# Patient Record
Sex: FEMALE | Race: BLACK OR AFRICAN AMERICAN | Employment: FULL TIME | URBAN - METROPOLITAN AREA
[De-identification: names, ages, dates, MRNs, and addresses within clinical notes are randomized per-mention and may not be internally consistent; named-entity substitution may affect disease eponyms.]

---

## 2017-01-13 ENCOUNTER — OFFICE VISIT (OUTPATIENT)
Dept: FAMILY MEDICINE CLINIC | Age: 62
End: 2017-01-13

## 2017-01-13 VITALS
DIASTOLIC BLOOD PRESSURE: 92 MMHG | BODY MASS INDEX: 36.16 KG/M2 | SYSTOLIC BLOOD PRESSURE: 168 MMHG | WEIGHT: 211.8 LBS | HEART RATE: 80 BPM | RESPIRATION RATE: 16 BRPM | HEIGHT: 64 IN | TEMPERATURE: 98.4 F

## 2017-01-13 DIAGNOSIS — R00.2 PALPITATIONS: ICD-10-CM

## 2017-01-13 DIAGNOSIS — E66.09 NON MORBID OBESITY DUE TO EXCESS CALORIES: ICD-10-CM

## 2017-01-13 DIAGNOSIS — I10 ESSENTIAL HYPERTENSION: Primary | ICD-10-CM

## 2017-01-13 DIAGNOSIS — E78.2 MIXED HYPERLIPIDEMIA: ICD-10-CM

## 2017-01-13 RX ORDER — SIMVASTATIN 20 MG/1
10 TABLET, FILM COATED ORAL
Qty: 45 TAB | Refills: 3 | Status: SHIPPED | OUTPATIENT
Start: 2017-01-13 | End: 2017-05-23 | Stop reason: SDUPTHER

## 2017-01-13 RX ORDER — HYDROCHLOROTHIAZIDE 25 MG/1
25 TABLET ORAL DAILY
Qty: 90 TAB | Refills: 3 | Status: SHIPPED | OUTPATIENT
Start: 2017-01-13 | End: 2017-05-23 | Stop reason: SDUPTHER

## 2017-01-13 NOTE — PROGRESS NOTES
1. Have you been to the ER, urgent care clinic since your last visit? Hospitalized since your last visit? No.     2. Have you seen or consulted any other health care providers outside of the [facility] since your last visit? Include any pap smears or colon screening. No.    Patient presents with follow up Hypertension.

## 2017-01-13 NOTE — PROGRESS NOTES
Birgit Min is a 63 y.o. female and presents with Follow Up Chronic Condition and Hypertension       Subjective:    htn- taking all meds- started norvasc. Palpitations- never went for stress test because she couldn't make times given. Stopped caffeine and these have resolved. Hyperlipidemia- confusion over dosing at pharmacy. Obesity- no wt loss- trying to watch calories. Assessment/Plan:      htn- not controlled- increase hctz to 25mg today. Hyperlipidemia- needs only 10 mg zocor given norvasc addition. (will rx 20mg to cut in half as pt reports this is cheaper). Palpitations- resolved with caffeine removal- still would like to get stress testing given multiple risk factors and chronicity of these. Pt would like to try to get stress test at [facility] at this point- pt given phone number to call on Monday if they do not contact her today. Obesity- reminded of importance of calorie reduction to get weight to BMI 25 eventually by 1-2 lbs/week      RTC in 2 weeks. Orders Placed This Encounter    simvastatin (ZOCOR) 20 mg tablet     Sig: Take 0.5 Tabs by mouth nightly. Dispense:  45 Tab     Refill:  3    hydroCHLOROthiazide (HYDRODIURIL) 25 mg tablet     Sig: Take 1 Tab by mouth daily. Dispense:  90 Tab     Refill:  3       ROS:  Negative except as mentioned above  Cardiac- no chest pain or palpitations  Pulmonary- no sob or wheezes  GI- no n/v or diarrhea. SH:  Social History   Substance Use Topics    Smoking status: Never Smoker    Smokeless tobacco: None    Alcohol use No         Medications/Allergies:  Current Outpatient Prescriptions on File Prior to Visit   Medication Sig Dispense Refill    amLODIPine (NORVASC) 5 mg tablet Take 1 Tab by mouth daily. 90 Tab 3    simvastatin (ZOCOR) 10 mg tablet Take 1 Tab by mouth nightly. 90 Tab 3    aspirin delayed-release 81 mg tablet Take 1 Tab by mouth daily.  1 Tab 0    lisinopril (PRINIVIL, ZESTRIL) 40 mg tablet Take 1 Tab by mouth daily. 90 Tab 3    multivitamin (ONE A DAY) tablet Take 1 Tab by mouth daily.  hydrochlorothiazide (MICROZIDE) 12.5 mg capsule TAKE ONE CAPSULE BY MOUTH EVERY DAY 90 Cap 3    potassium chloride (K-DUR, KLOR-CON) 10 mEq tablet Take 2 tablets by mouth daily. 60 tablet 2     No current facility-administered medications on file prior to visit. Allergies   Allergen Reactions    Amoxicillin Anaphylaxis       Objective:  Visit Vitals    BP (!) 168/92    Pulse 80    Temp 98.4 °F (36.9 °C) (Oral)    Resp 16    Ht 5' 4\" (1.626 m)    Wt 211 lb 12.8 oz (96.1 kg)    BMI 36.36 kg/m2    Body mass index is 36.36 kg/(m^2). Constitutional: Well developed, nourished, no distress, alert   CV: S1, S2.  RRR. No murmurs/rubs. No thrills palpated. No carotid bruits. Intact distal pulses. No edema. Pulm: No abnormalities on inspection. Clear to auscultation bilaterally. No wheezing/rhonchi. Normal effort. GI: Soft, nontender, nondistended. Normal active bowel sounds. No  masses on palpation. No hepatosplenomegaly.

## 2017-01-13 NOTE — PATIENT INSTRUCTIONS
Body Mass Index: Care Instructions  Your Care Instructions    Body mass index (BMI) can help you see if your weight is raising your risk for health problems. It uses a formula to compare how much you weigh with how tall you are. A BMI between 18.5 and 24.9 is considered healthy. A BMI between 25 and 29.9 is considered overweight. A BMI of 30 or higher is considered obese. If your BMI is in the normal range, it means that you have a lower risk for weight-related health problems. If your BMI is in the overweight or obese range, you may be at increased risk for weight-related health problems, such as high blood pressure, heart disease, stroke, arthritis or joint pain, and diabetes. BMI is just one measure of your risk for weight-related health problems. You may be at higher risk for health problems if you are not active, you eat an unhealthy diet, or you drink too much alcohol or use tobacco products. Follow-up care is a key part of your treatment and safety. Be sure to make and go to all appointments, and call your doctor if you are having problems. It's also a good idea to know your test results and keep a list of the medicines you take. How can you care for yourself at home? · Practice healthy eating habits. This includes eating plenty of fruits, vegetables, whole grains, lean protein, and low-fat dairy. · Get at least 30 minutes of exercise 5 days a week or more. Brisk walking is a good choice. You also may want to do other activities, such as running, swimming, cycling, or playing tennis or team sports. · Do not smoke. Smoking can increase your risk for health problems. If you need help quitting, talk to your doctor about stop-smoking programs and medicines. These can increase your chances of quitting for good. · Limit alcohol to 2 drinks a day for men and 1 drink a day for women. Too much alcohol can cause health problems.   If you have a BMI higher than 25  · Your doctor may do other tests to check your risk for weight-related health problems. This may include measuring the distance around your waist. A waist measurement of more than 40 inches in men or 35 inches in women can increase the risk of weight-related health problems. · Talk with your doctor about steps you can take to stay healthy or improve your health. You may need to make lifestyle changes to lose weight and stay healthy, such as changing your diet and getting regular exercise. Where can you learn more? Go to http://yony-uyen.info/. Enter S176 in the search box to learn more about \"Body Mass Index: Care Instructions. \"  Current as of: February 16, 2016  Content Version: 11.1  © 9801-3196 DescribeMe. Care instructions adapted under license by [employer] (which disclaims liability or warranty for this information). If you have questions about a medical condition or this instruction, always ask your healthcare professional. [place] [address] any warranty or liability for your use of this information. Low Sodium Diet (2,000 Milligram): Care Instructions  Your Care Instructions  Too much sodium causes your body to hold on to extra water. This can raise your blood pressure and force your heart and kidneys to work harder. In very serious cases, this could cause you to be put in the hospital. It might even be life-threatening. By limiting sodium, you will feel better and lower your risk of serious problems. The most common source of sodium is salt. People get most of the salt in their diet from canned, prepared, and packaged foods. Fast food and restaurant meals also are very high in sodium. Your doctor will probably limit your sodium to less than 2,000 milligrams (mg) a day. This limit counts all the sodium in prepared and packaged foods and any salt you add to your food. Follow-up care is a key part of your treatment and safety.  Be sure to make and go to all appointments, and call your doctor if you are having problems. It's also a good idea to know your test results and keep a list of the medicines you take. How can you care for yourself at home? Read food labels  · Read labels on cans and food packages. The labels tell you how much sodium is in each serving. Make sure that you look at the serving size. If you eat more than the serving size, you have eaten more sodium. · Food labels also tell you the Percent Daily Value for sodium. Choose products with low Percent Daily Values for sodium. · Be aware that sodium can come in forms other than salt, including monosodium glutamate (MSG), sodium citrate, and sodium bicarbonate (baking soda). MSG is often added to Asian food. When you eat out, you can sometimes ask for food without MSG or added salt. Buy low-sodium foods  · Buy foods that are labeled \"unsalted\" (no salt added), \"sodium-free\" (less than 5 mg of sodium per serving), or \"low-sodium\" (less than 140 mg of sodium per serving). Foods labeled \"reduced-sodium\" and \"light sodium\" may still have too much sodium. Be sure to read the label to see how much sodium you are getting. · Buy fresh vegetables, or frozen vegetables without added sauces. Buy low-sodium versions of canned vegetables, soups, and other canned goods. Prepare low-sodium meals  · Cut back on the amount of salt you use in cooking. This will help you adjust to the taste. Do not add salt after cooking. One teaspoon of salt has about 2,300 mg of sodium. · Take the salt shaker off the table. · Flavor your food with garlic, lemon juice, onion, vinegar, herbs, and spices. Do not use soy sauce, lite soy sauce, steak sauce, onion salt, garlic salt, celery salt, mustard, or ketchup on your food. · Use low-sodium salad dressings, sauces, and ketchup. Or make your own salad dressings and sauces without adding salt. · Use less salt (or none) when recipes call for it.  You can often use half the salt a recipe calls for without losing flavor. Other foods such as rice, pasta, and grains do not need added salt. · Rinse canned vegetables, and cook them in fresh water. This removes somebut not allof the salt. · Avoid water that is naturally high in sodium or that has been treated with water softeners, which add sodium. Call your local water company to find out the sodium content of your water supply. If you buy bottled water, read the label and choose a sodium-free brand. Avoid high-sodium foods  · Avoid eating:  ¨ Smoked, cured, salted, and canned meat, fish, and poultry. ¨ Ham, coronel, hot dogs, and luncheon meats. ¨ Regular, hard, and processed cheese and regular peanut butter. ¨ Crackers with salted tops, and other salted snack foods such as pretzels, chips, and salted popcorn. ¨ Frozen prepared meals, unless labeled low-sodium. ¨ Canned and dried soups, broths, and bouillon, unless labeled sodium-free or low-sodium. ¨ Canned vegetables, unless labeled sodium-free or low-sodium. ¨ Western Claribel fries, pizza, tacos, and other fast foods. ¨ Pickles, olives, ketchup, and other condiments, especially soy sauce, unless labeled sodium-free or low-sodium. Where can you learn more? Go to http://yony-uyen.info/. Enter S656 in the search box to learn more about \"Low Sodium Diet (2,000 Milligram): Care Instructions. \"  Current as of: July 26, 2016  Content Version: 11.1  © 2250-2525 [employer]. Care instructions adapted under license by [employer] (which disclaims liability or warranty for this information). If you have questions about a medical condition or this instruction, always ask your healthcare professional. [place] [address] any warranty or liability for your use of this information.

## 2017-01-13 NOTE — MR AVS SNAPSHOT
Visit Information Date & Time Provider Department Dept. Phone Encounter #  
 1/13/2017  7:45 AM Tiffany Johnston, [address] [place] [address] [phone] [id number] Follow-up Instructions Return in about 2 weeks (around 1/27/2017). Upcoming Health Maintenance Date Due Hepatitis C Screening 1955 COLONOSCOPY 11/26/1975 Pneumococcal 19-64 Medium Risk (1 of 1 - PPSV23) 11/26/1976 EYE EXAM RETINAL OR DILATED Q1 11/20/2015 FOOT EXAM Q1 2/25/2016 MICROALBUMIN Q1 12/29/2016 HEMOGLOBIN A1C Q6M 6/12/2017 BREAST CANCER SCRN MAMMOGRAM 6/25/2017 LIPID PANEL Q1 12/12/2017 PAP AKA CERVICAL CYTOLOGY 2/25/2018 DTaP/Tdap/Td series (2 - Td) 11/12/2024 Allergies as of 1/13/2017  Review Complete On: 1/13/2017 By: Tiffany Johnston MD  
  
 Severity Noted Reaction Type Reactions Amoxicillin High 08/28/2014   Intolerance Anaphylaxis Current Immunizations  Reviewed on 12/30/2016 Name Date Influenza Vaccine 11/8/2012 Influenza Vaccine Faraz Velascosteffen) 12/30/2016, 10/5/2015 Tdap 11/12/2014  5:00 PM  
  
 Not reviewed this visit Vitals BP Pulse Temp Resp Height(growth percentile) Weight(growth percentile) (!) 168/92 80 98.4 °F (36.9 °C) (Oral) 16 5' 4\" (1.626 m) 211 lb 12.8 oz (96.1 kg) BMI OB Status Smoking Status 36.36 kg/m2 Postmenopausal Never Smoker Vitals History BMI and BSA Data Body Mass Index Body Surface Area  
 36.36 kg/m 2 2.08 m 2 Preferred Pharmacy Pharmacy Name Phone [facility], [address] [place] [address] [facility] Creole Danyy Your Updated Medication List  
  
   
This list is accurate as of: 1/13/17  8:13 AM.  Always use your most recent med list. amLODIPine 5 mg tablet Commonly known as:  Smith Fanti Take 1 Tab by mouth daily. aspirin delayed-release 81 mg tablet Take 1 Tab by mouth daily. hydroCHLOROthiazide 25 mg tablet Commonly known as:  HYDRODIURIL Take 1 Tab by mouth daily. lisinopril 40 mg tablet Commonly known as:  Thersia Kubas Take 1 Tab by mouth daily. multivitamin tablet Commonly known as:  ONE A DAY Take 1 Tab by mouth daily. potassium chloride 10 mEq tablet Commonly known as:  K-DUR, KLOR-CON Take 2 tablets by mouth daily. simvastatin 20 mg tablet Commonly known as:  ZOCOR Take 0.5 Tabs by mouth nightly. Prescriptions Sent to Pharmacy Refills  
 simvastatin (ZOCOR) 20 mg tablet 3 Sig: Take 0.5 Tabs by mouth nightly. Class: Normal  
 Pharmacy: [address] [place] [address] Ph #: [phone] Route: Oral  
 hydroCHLOROthiazide (HYDRODIURIL) 25 mg tablet 3 Sig: Take 1 Tab by mouth daily. Class: Normal  
 Pharmacy: [address] [place] [address] Ph #: [phone] Route: Oral  
  
Follow-up Instructions Return in about 2 weeks (around 1/27/2017). Patient Instructions Body Mass Index: Care Instructions Your Care Instructions Body mass index (BMI) can help you see if your weight is raising your risk for health problems. It uses a formula to compare how much you weigh with how tall you are. A BMI between 18.5 and 24.9 is considered healthy. A BMI between 25 and 29.9 is considered overweight. A BMI of 30 or higher is considered obese. If your BMI is in the normal range, it means that you have a lower risk for weight-related health problems. If your BMI is in the overweight or obese range, you may be at increased risk for weight-related health problems, such as high blood pressure, heart disease, stroke, arthritis or joint pain, and diabetes. BMI is just one measure of your risk for weight-related health problems.  You may be at higher risk for health problems if you are not active, you eat an unhealthy diet, or you drink too much alcohol or use tobacco products. Follow-up care is a key part of your treatment and safety. Be sure to make and go to all appointments, and call your doctor if you are having problems. It's also a good idea to know your test results and keep a list of the medicines you take. How can you care for yourself at home? · Practice healthy eating habits. This includes eating plenty of fruits, vegetables, whole grains, lean protein, and low-fat dairy. · Get at least 30 minutes of exercise 5 days a week or more. Brisk walking is a good choice. You also may want to do other activities, such as running, swimming, cycling, or playing tennis or team sports. · Do not smoke. Smoking can increase your risk for health problems. If you need help quitting, talk to your doctor about stop-smoking programs and medicines. These can increase your chances of quitting for good. · Limit alcohol to 2 drinks a day for men and 1 drink a day for women. Too much alcohol can cause health problems. If you have a BMI higher than 25 · Your doctor may do other tests to check your risk for weight-related health problems. This may include measuring the distance around your waist. A waist measurement of more than 40 inches in men or 35 inches in women can increase the risk of weight-related health problems. · Talk with your doctor about steps you can take to stay healthy or improve your health. You may need to make lifestyle changes to lose weight and stay healthy, such as changing your diet and getting regular exercise. Where can you learn more? Go to http://yony-uyen.info/. Enter S176 in the search box to learn more about \"Body Mass Index: Care Instructions. \" Current as of: February 16, 2016 Content Version: 11.1 © 8081-6616 [employer].  Care instructions adapted under license by [employer] (which disclaims liability or warranty for this information). If you have questions about a medical condition or this instruction, always ask your healthcare professional. [address] any warranty or liability for your use of this information. Low Sodium Diet (2,000 Milligram): Care Instructions Your Care Instructions Too much sodium causes your body to hold on to extra water. This can raise your blood pressure and force your heart and kidneys to work harder. In very serious cases, this could cause you to be put in the hospital. It might even be life-threatening. By limiting sodium, you will feel better and lower your risk of serious problems. The most common source of sodium is salt. People get most of the salt in their diet from canned, prepared, and packaged foods. Fast food and restaurant meals also are very high in sodium. Your doctor will probably limit your sodium to less than 2,000 milligrams (mg) a day. This limit counts all the sodium in prepared and packaged foods and any salt you add to your food. Follow-up care is a key part of your treatment and safety. Be sure to make and go to all appointments, and call your doctor if you are having problems. It's also a good idea to know your test results and keep a list of the medicines you take. How can you care for yourself at home? Read food labels · Read labels on cans and food packages. The labels tell you how much sodium is in each serving. Make sure that you look at the serving size. If you eat more than the serving size, you have eaten more sodium. · Food labels also tell you the Percent Daily Value for sodium. Choose products with low Percent Daily Values for sodium. · Be aware that sodium can come in forms other than salt, including monosodium glutamate (MSG), sodium citrate, and sodium bicarbonate (baking soda). MSG is often added to Asian food. When you eat out, you can sometimes ask for food without MSG or added salt. Buy low-sodium foods · Buy foods that are labeled \"unsalted\" (no salt added), \"sodium-free\" (less than 5 mg of sodium per serving), or \"low-sodium\" (less than 140 mg of sodium per serving). Foods labeled \"reduced-sodium\" and \"light sodium\" may still have too much sodium. Be sure to read the label to see how much sodium you are getting. · Buy fresh vegetables, or frozen vegetables without added sauces. Buy low-sodium versions of canned vegetables, soups, and other canned goods. Prepare low-sodium meals · Cut back on the amount of salt you use in cooking. This will help you adjust to the taste. Do not add salt after cooking. One teaspoon of salt has about 2,300 mg of sodium. · Take the salt shaker off the table. · Flavor your food with garlic, lemon juice, onion, vinegar, herbs, and spices. Do not use soy sauce, lite soy sauce, steak sauce, onion salt, garlic salt, celery salt, mustard, or ketchup on your food. · Use low-sodium salad dressings, sauces, and ketchup. Or make your own salad dressings and sauces without adding salt. · Use less salt (or none) when recipes call for it. You can often use half the salt a recipe calls for without losing flavor. Other foods such as rice, pasta, and grains do not need added salt. · Rinse canned vegetables, and cook them in fresh water. This removes somebut not allof the salt. · Avoid water that is naturally high in sodium or that has been treated with water softeners, which add sodium. Call your local water company to find out the sodium content of your water supply. If you buy bottled water, read the label and choose a sodium-free brand. Avoid high-sodium foods · Avoid eating: ¨ Smoked, cured, salted, and canned meat, fish, and poultry. ¨ Ham, coronel, hot dogs, and luncheon meats. ¨ Regular, hard, and processed cheese and regular peanut butter. ¨ Crackers with salted tops, and other salted snack foods such as pretzels, chips, and salted popcorn. ¨ Frozen prepared meals, unless labeled low-sodium. ¨ Canned and dried soups, broths, and bouillon, unless labeled sodium-free or low-sodium. ¨ Canned vegetables, unless labeled sodium-free or low-sodium. ¨ Western Claribel fries, pizza, tacos, and other fast foods. ¨ Pickles, olives, ketchup, and other condiments, especially soy sauce, unless labeled sodium-free or low-sodium. Where can you learn more? Go to http://yony-uyen.info/. Enter P980 in the search box to learn more about \"Low Sodium Diet (2,000 Milligram): Care Instructions. \" Current as of: July 26, 2016 Content Version: 11.1 © 2374-5314 [email or web address]. Care instructions adapted under license by [employer] (which disclaims liability or warranty for this information). If you have questions about a medical condition or this instruction, always ask your healthcare professional. [place] [address] any warranty or liability for your use of this information. Introducing [facility]! Butch Coello introduces [employer] patient portal. Now you can access parts of your medical record, email your doctor's office, and request medication refills online. 1. In your internet browser, go to [email or web address] 2. Click on the First Time User? Click Here link in the Sign In box. You will see the New Member Sign Up page. 3. Enter your [employer] Access Code exactly as it appears below. You will not need to use this code after youve completed the sign-up process. If you do not sign up before the expiration date, you must request a new code. · [employer] Access Code: [email or web address] Expires: 3/30/2017  9:22 AM 
 
4. Enter the last four digits of your Social Security Number (xxxx) and Date of Birth (mm/dd/yyyy) as indicated and click Submit. You will be taken to the next sign-up page. 5. Create a [employer] ID.  This will be your [employer] login ID and cannot be changed, so think of one that is secure and easy to remember. 6. Create a Crosswise password. You can change your password at any time. 7. Enter your Password Reset Question and Answer. This can be used at a later time if you forget your password. 8. Enter your e-mail address. You will receive e-mail notification when new information is available in [address] 9. Click Sign Up. You can now view and download portions of your medical record. 10. Click the Download Summary menu link to download a portable copy of your medical information. If you have questions, please visit the Frequently Asked Questions section of the Crosswise website. Remember, Crosswise is NOT to be used for urgent needs. For medical emergencies, dial 911. Now available from your iPhone and Android! Please provide this summary of care documentation to your next provider. Your primary care clinician is listed as KEVEN Smith. If you have any questions after today's visit, please call [phone].

## 2017-02-06 ENCOUNTER — OFFICE VISIT (OUTPATIENT)
Dept: FAMILY MEDICINE CLINIC | Age: 62
End: 2017-02-06

## 2017-02-06 VITALS
WEIGHT: 211 LBS | HEIGHT: 64 IN | SYSTOLIC BLOOD PRESSURE: 154 MMHG | HEART RATE: 85 BPM | DIASTOLIC BLOOD PRESSURE: 93 MMHG | TEMPERATURE: 96.6 F | BODY MASS INDEX: 36.02 KG/M2

## 2017-02-06 DIAGNOSIS — I10 ESSENTIAL HYPERTENSION: Primary | ICD-10-CM

## 2017-02-06 DIAGNOSIS — E78.2 MIXED HYPERLIPIDEMIA: ICD-10-CM

## 2017-02-06 DIAGNOSIS — R80.9 PROTEINURIA: ICD-10-CM

## 2017-02-06 DIAGNOSIS — E66.9 OBESITY (BMI 30-39.9): ICD-10-CM

## 2017-02-06 LAB
BILIRUB UR QL STRIP: NEGATIVE
GLUCOSE UR-MCNC: NEGATIVE MG/DL
KETONES P FAST UR STRIP-MCNC: NEGATIVE MG/DL
PH UR STRIP: 8.5 [PH] (ref 4.6–8)
PROT UR QL STRIP: ABNORMAL MG/DL
SP GR UR STRIP: 1.01 (ref 1–1.03)
UA UROBILINOGEN AMB POC: ABNORMAL (ref 0.2–1)
URINALYSIS CLARITY POC: CLEAR
URINALYSIS COLOR POC: YELLOW
URINE BLOOD POC: NEGATIVE
URINE LEUKOCYTES POC: NEGATIVE
URINE NITRITES POC: NEGATIVE

## 2017-02-06 RX ORDER — METOPROLOL SUCCINATE 25 MG/1
25 TABLET, EXTENDED RELEASE ORAL DAILY
Qty: 90 TAB | Refills: 1 | Status: SHIPPED | OUTPATIENT
Start: 2017-02-06 | End: 2017-05-23 | Stop reason: SDUPTHER

## 2017-02-06 RX ORDER — GLUCOSAMINE SULFATE 1500 MG
POWDER IN PACKET (EA) ORAL DAILY
COMMUNITY
End: 2017-09-04

## 2017-02-06 NOTE — MR AVS SNAPSHOT
Visit Information Date & Time Provider Department Dept. Phone Encounter #  
 2/6/2017  5:00 PM Carri Arteaga, [address] [facility] [phone] [id number] Follow-up Instructions Return in about 3 months (around 5/6/2017) for 30 minute slot. Upcoming Health Maintenance Date Due Hepatitis C Screening 1955 COLONOSCOPY 11/26/1975 Pneumococcal 19-64 Medium Risk (1 of 1 - PPSV23) 11/26/1976 EYE EXAM RETINAL OR DILATED Q1 11/20/2015 FOOT EXAM Q1 2/25/2016 MICROALBUMIN Q1 12/29/2016 HEMOGLOBIN A1C Q6M 6/12/2017 BREAST CANCER SCRN MAMMOGRAM 6/25/2017 LIPID PANEL Q1 12/12/2017 PAP AKA CERVICAL CYTOLOGY 2/25/2018 DTaP/Tdap/Td series (2 - Td) 11/12/2024 Allergies as of 2/6/2017  Review Complete On: 2/6/2017 By: Carri Arteaga MD  
  
 Severity Noted Reaction Type Reactions Amoxicillin High 08/28/2014   Intolerance Anaphylaxis Current Immunizations  Reviewed on 12/30/2016 Name Date Influenza Vaccine 11/8/2012 Influenza Vaccine Edouard Push) 12/30/2016, 10/5/2015 Tdap 11/12/2014  5:00 PM  
  
 Not reviewed this visit You Were Diagnosed With   
  
 Codes Comments Essential hypertension    -  Primary ICD-10-CM: I10 
ICD-9-CM: 401.9 Mixed hyperlipidemia     ICD-10-CM: E78.2 ICD-9-CM: 272.2 Obesity (BMI 30-39. 9)     ICD-10-CM: E66.9 ICD-9-CM: 278.00 Vitals BP Pulse Temp Height(growth percentile) Weight(growth percentile) BMI  
 (!) 154/93 85 96.6 °F (35.9 °C) (Oral) 5' 4\" (1.626 m) 211 lb (95.7 kg) 36.22 kg/m2 OB Status Smoking Status Postmenopausal Never Smoker BMI and BSA Data Body Mass Index Body Surface Area  
 36.22 kg/m 2 2.08 m 2 Preferred Pharmacy Pharmacy Name Phone [facility], [address] [place] [address] [phone] Your Updated Medication List  
  
   
 This list is accurate as of: 2/6/17  5:31 PM.  Always use your most recent med list. amLODIPine 5 mg tablet Commonly known as:  Elyn Coffer Take 1 Tab by mouth daily. aspirin delayed-release 81 mg tablet Take 1 Tab by mouth daily. hydroCHLOROthiazide 25 mg tablet Commonly known as:  HYDRODIURIL Take 1 Tab by mouth daily. lisinopril 40 mg tablet Commonly known as:  Robertha Roup Take 1 Tab by mouth daily. metoprolol succinate 25 mg XL tablet Commonly known as:  TOPROL-XL Take 1 Tab by mouth daily. multivitamin tablet Commonly known as:  ONE A DAY Take 1 Tab by mouth daily. simvastatin 20 mg tablet Commonly known as:  ZOCOR Take 0.5 Tabs by mouth nightly. VITAMIN D3 1,000 unit Cap Generic drug:  cholecalciferol Take  by mouth daily. Prescriptions Sent to Pharmacy Refills  
 metoprolol succinate (TOPROL-XL) 25 mg XL tablet 1 Sig: Take 1 Tab by mouth daily. Class: Normal  
 Pharmacy: [id number] [address] [facility] [address], [address] [place] [address] #: [phone] Route: Oral  
  
Follow-up Instructions Return in about 3 months (around 5/6/2017) for 30 minute slot. Patient Instructions DASH Diet: Care Instructions Your Care Instructions The DASH diet is an eating plan that can help lower your blood pressure. DASH stands for Dietary Approaches to Stop Hypertension. Hypertension is high blood pressure. The DASH diet focuses on eating foods that are high in calcium, potassium, and magnesium. These nutrients can lower blood pressure. The foods that are highest in these nutrients are fruits, vegetables, low-fat dairy products, nuts, seeds, and legumes. But taking calcium, potassium, and magnesium supplements instead of eating foods that are high in those nutrients does not have the same effect. The DASH diet also includes whole grains, fish, and poultry. The DASH diet is one of several lifestyle changes your doctor may recommend to lower your high blood pressure. Your doctor may also want you to decrease the amount of sodium in your diet. Lowering sodium while following the DASH diet can lower blood pressure even further than just the DASH diet alone. Follow-up care is a key part of your treatment and safety. Be sure to make and go to all appointments, and call your doctor if you are having problems. It's also a good idea to know your test results and keep a list of the medicines you take. How can you care for yourself at home? Following the DASH diet · Eat 4 to 5 servings of fruit each day. A serving is 1 medium-sized piece of fruit, ½ cup chopped or canned fruit, 1/4 cup dried fruit, or 4 ounces (½ cup) of fruit juice. Choose fruit more often than fruit juice. · Eat 4 to 5 servings of vegetables each day. A serving is 1 cup of lettuce or raw leafy vegetables, ½ cup of chopped or cooked vegetables, or 4 ounces (½ cup) of vegetable juice. Choose vegetables more often than vegetable juice. · Get 2 to 3 servings of low-fat and fat-free dairy each day. A serving is 8 ounces of milk, 1 cup of yogurt, or 1 ½ ounces of cheese. · Eat 6 to 8 servings of grains each day. A serving is 1 slice of bread, 1 ounce of dry cereal, or ½ cup of cooked rice, pasta, or cooked cereal. Try to choose whole-grain products as much as possible. · Limit lean meat, poultry, and fish to 2 servings each day. A serving is 3 ounces, about the size of a deck of cards. · Eat 4 to 5 servings of nuts, seeds, and legumes (cooked dried beans, lentils, and split peas) each week. A serving is 1/3 cup of nuts, 2 tablespoons of seeds, or ½ cup of cooked beans or peas. · Limit fats and oils to 2 to 3 servings each day. A serving is 1 teaspoon of vegetable oil or 2 tablespoons of salad dressing. · Limit sweets and added sugars to 5 servings or less a week.  A serving is 1 tablespoon jelly or jam, ½ cup sorbet, or 1 cup of lemonade. · Eat less than 2,300 milligrams (mg) of sodium a day. If you limit your sodium to 1,500 mg a day, you can lower your blood pressure even more. Tips for success · Start small. Do not try to make dramatic changes to your diet all at once. You might feel that you are missing out on your favorite foods and then be more likely to not follow the plan. Make small changes, and stick with them. Once those changes become habit, add a few more changes. · Try some of the following: ¨ Make it a goal to eat a fruit or vegetable at every meal and at snacks. This will make it easy to get the recommended amount of fruits and vegetables each day. ¨ Try yogurt topped with fruit and nuts for a snack or healthy dessert. ¨ Add lettuce, tomato, cucumber, and onion to sandwiches. ¨ Combine a ready-made pizza crust with low-fat mozzarella cheese and lots of vegetable toppings. Try using tomatoes, squash, spinach, broccoli, carrots, cauliflower, and onions. ¨ Have a variety of cut-up vegetables with a low-fat dip as an appetizer instead of chips and dip. ¨ Sprinkle sunflower seeds or chopped almonds over salads. Or try adding chopped walnuts or almonds to cooked vegetables. ¨ Try some vegetarian meals using beans and peas. Add garbanzo or kidney beans to salads. Make burritos and tacos with mashed ceballos beans or black beans. Where can you learn more? Go to http://yony-uyen.info/. Enter G684 in the search box to learn more about \"DASH Diet: Care Instructions. \" Current as of: March 23, 2016 Content Version: 11.1 © 5344-3991 [employer]. Care instructions adapted under license by [employer] (which disclaims liability or warranty for this information).  If you have questions about a medical condition or this instruction, always ask your healthcare professional. [employer] disclaims any warranty or liability for your use of this information. Introducing [facility]! [employer] introduces [employer] patient portal. Now you can access parts of your medical record, email your doctor's office, and request medication refills online. 1. In your internet browser, go to [email or web address] 2. Click on the First Time User? Click Here link in the Sign In box. You will see the New Member Sign Up page. 3. Enter your [employer] Access Code exactly as it appears below. You will not need to use this code after youve completed the sign-up process. If you do not sign up before the expiration date, you must request a new code. · [employer] Access Code: [email or web address] Expires: 3/30/2017  9:22 AM 
 
4. Enter the last four digits of your Social Security Number (xxxx) and Date of Birth (mm/dd/yyyy) as indicated and click Submit. You will be taken to the next sign-up page. 5. Create a [employer] ID. This will be your [employer] login ID and cannot be changed, so think of one that is secure and easy to remember. 6. Create a [employer] password. You can change your password at any time. 7. Enter your Password Reset Question and Answer. This can be used at a later time if you forget your password. 8. Enter your e-mail address. You will receive e-mail notification when new information is available in [address] 9. Click Sign Up. You can now view and download portions of your medical record. 10. Click the Download Summary menu link to download a portable copy of your medical information. If you have questions, please visit the Frequently Asked Questions section of the [employer] website. Remember, [employer] is NOT to be used for urgent needs. For medical emergencies, dial 911. Now available from your iPhone and Android! Please provide this summary of care documentation to your next provider. Your primary care clinician is listed as KEVEN Smith. If you have any questions after today's visit, please call [phone].

## 2017-02-06 NOTE — PROGRESS NOTES
Warren Will is a 63 y.o. female and presents with Follow Up Chronic Condition; Hypertension; Cholesterol Problem; Palpitations; and Results (Stress test)       Subjective:    htn- taking all meds- increased hctz to 25mg  Palpitations- never went for stress test because she couldn't make times given. Stopped caffeine and these have resolved. Hyperlipidemia- confusion over dosing at pharmacy. Obesity- no wt loss- trying to watch calories. Pt thinks she may have a uti today- had some mild microalbuminuria on old testing.      Assessment/Plan:      htn- not controlled- adding low dose Beta blocker. Hyperlipidemia- needs only 10 mg zocor given norvasc addition. (will rx 20mg to cut in half as pt reports this is cheaper). Palpitations- resolved with caffeine removal- negative stress test.   Obesity- again reminded of importance of calorie reduction to get weight to BMI 25 eventually by 1-2 lbs/week  1+ urine protein- recheck next visit- will plan to do microalbumin if still present. Pt is prediabetic and on ACE already. RTC in 3 months  Orders Placed This Encounter    cholecalciferol (VITAMIN D3) 1,000 unit cap     Sig: Take  by mouth daily.  metoprolol succinate (TOPROL-XL) 25 mg XL tablet     Sig: Take 1 Tab by mouth daily. Dispense:  90 Tab     Refill:  1           ROS:  Negative except as mentioned above  Cardiac- no chest pain or palpitations  Pulmonary- no sob or wheezes  GI- no n/v or diarrhea. SH:  Social History   Substance Use Topics    Smoking status: Never Smoker    Smokeless tobacco: None    Alcohol use No         Medications/Allergies:  Current Outpatient Prescriptions on File Prior to Visit   Medication Sig Dispense Refill    simvastatin (ZOCOR) 20 mg tablet Take 0.5 Tabs by mouth nightly. 45 Tab 3    hydroCHLOROthiazide (HYDRODIURIL) 25 mg tablet Take 1 Tab by mouth daily. 90 Tab 3    amLODIPine (NORVASC) 5 mg tablet Take 1 Tab by mouth daily.  90 Tab 3    aspirin delayed-release 81 mg tablet Take 1 Tab by mouth daily. 1 Tab 0    lisinopril (PRINIVIL, ZESTRIL) 40 mg tablet Take 1 Tab by mouth daily. 90 Tab 3    multivitamin (ONE A DAY) tablet Take 1 Tab by mouth daily. No current facility-administered medications on file prior to visit. Allergies   Allergen Reactions    Amoxicillin Anaphylaxis       Objective:  Visit Vitals    BP (!) 154/93    Pulse 85    Temp 96.6 °F (35.9 °C) (Oral)    Ht 5' 4\" (1.626 m)    Wt 211 lb (95.7 kg)    BMI 36.22 kg/m2    Body mass index is 36.22 kg/(m^2). Constitutional: Well developed, nourished, no distress, alert   CV: S1, S2.  RRR. No murmurs/rubs. No thrills palpated. No carotid bruits. Intact distal pulses. No edema. Pulm: No abnormalities on inspection. Clear to auscultation bilaterally. No wheezing/rhonchi. Normal effort. GI: Soft, nontender, nondistended. Normal active bowel sounds. No  masses on palpation. No hepatosplenomegaly.

## 2017-02-06 NOTE — PROGRESS NOTES
1. Have you been to the ER, urgent care clinic since your last visit? Hospitalized since your last visit? No.     2. Have you seen or consulted any other health care providers outside of the [address] [place] [address] since your last visit? Include any pap smears or colon screening. No.    Patient presents with follow up Hypertension and Palpitations.  Need results of her stress test.

## 2017-02-06 NOTE — PATIENT INSTRUCTIONS

## 2017-05-23 ENCOUNTER — OFFICE VISIT (OUTPATIENT)
Dept: FAMILY MEDICINE CLINIC | Age: 62
End: 2017-05-23

## 2017-05-23 VITALS
HEIGHT: 64 IN | TEMPERATURE: 98.5 F | DIASTOLIC BLOOD PRESSURE: 88 MMHG | SYSTOLIC BLOOD PRESSURE: 141 MMHG | BODY MASS INDEX: 36.54 KG/M2 | RESPIRATION RATE: 16 BRPM | WEIGHT: 214 LBS | HEART RATE: 72 BPM

## 2017-05-23 DIAGNOSIS — E66.09 NON MORBID OBESITY DUE TO EXCESS CALORIES: ICD-10-CM

## 2017-05-23 DIAGNOSIS — Z12.11 COLON CANCER SCREENING: ICD-10-CM

## 2017-05-23 DIAGNOSIS — M71.22 BAKER'S CYST OF KNEE, LEFT: ICD-10-CM

## 2017-05-23 DIAGNOSIS — I10 ESSENTIAL HYPERTENSION: ICD-10-CM

## 2017-05-23 DIAGNOSIS — E78.2 MIXED HYPERLIPIDEMIA: ICD-10-CM

## 2017-05-23 DIAGNOSIS — R73.03 PREDIABETES: Primary | ICD-10-CM

## 2017-05-23 PROBLEM — M71.20 BAKER'S CYST OF KNEE: Status: ACTIVE | Noted: 2017-05-23

## 2017-05-23 RX ORDER — LISINOPRIL 40 MG/1
40 TABLET ORAL DAILY
Qty: 90 TAB | Refills: 3 | Status: SHIPPED | OUTPATIENT
Start: 2017-05-23 | End: 2018-05-18 | Stop reason: SDUPTHER

## 2017-05-23 RX ORDER — HYDROCHLOROTHIAZIDE 25 MG/1
25 TABLET ORAL DAILY
Qty: 90 TAB | Refills: 3 | Status: SHIPPED | OUTPATIENT
Start: 2017-05-23 | End: 2018-06-16 | Stop reason: SDUPTHER

## 2017-05-23 RX ORDER — AMLODIPINE BESYLATE 5 MG/1
5 TABLET ORAL DAILY
Qty: 90 TAB | Refills: 3 | Status: SHIPPED | OUTPATIENT
Start: 2017-05-23 | End: 2018-05-18 | Stop reason: SDUPTHER

## 2017-05-23 RX ORDER — SIMVASTATIN 10 MG/1
10 TABLET, FILM COATED ORAL
Qty: 90 TAB | Refills: 3 | Status: SHIPPED | OUTPATIENT
Start: 2017-05-23 | End: 2018-06-06 | Stop reason: SDUPTHER

## 2017-05-23 RX ORDER — METOPROLOL SUCCINATE 25 MG/1
25 TABLET, EXTENDED RELEASE ORAL DAILY
Qty: 90 TAB | Refills: 1 | Status: SHIPPED | OUTPATIENT
Start: 2017-05-23 | End: 2017-09-04

## 2017-05-23 NOTE — PROGRESS NOTES
Izabel Jackson is a 63 y.o. female and presents with Complete Physical; Hypertension; and Cholesterol Problem       Subjective:    HTN- didn't take meds this am. No cp or sob. Hyperlipidemia- on zocor at 10 mg now. No myalgia. cpe- would like this today. No new issues except pain in left knee  HM- never went to GI for scope- discussed barriers today- pt willing to go. Assessment/Plan:      Nidia Cardoza was seen today for complete physical, hypertension and cholesterol problem. Diagnoses and all orders for this visit:    Prediabetes  -     HEMOGLOBIN A1C WITH EAG; Future  -     MICROALBUMIN, UR, RAND W/ MICROALBUMIN/CREA RATIO; Future  -     HEMOGLOBIN A1C WITH EAG  -     MICROALBUMIN, UR, RAND W/ MICROALBUMIN/CREA RATIO    Mixed hyperlipidemia  -     LIPID PANEL; Future  -     LIPID PANEL    Essential hypertension  -     METABOLIC PANEL, COMPREHENSIVE; Future  -     MICROALBUMIN, UR, RAND W/ MICROALBUMIN/CREA RATIO; Future  -     METABOLIC PANEL, COMPREHENSIVE  -     MICROALBUMIN, UR, RAND W/ MICROALBUMIN/CREA RATIO    Non morbid obesity due to excess calories    Colon cancer screening  -     REFERRAL TO GASTROENTEROLOGY    Baker's cyst of knee, left- discussed care and will refer to sports med or ortho if pain continues. nsaid for 2 weeks. The patient was advised that NSAID-type medications have three very important potential side effects: MI, gastrointestinal irritation including hemorrhage and renal injuries. She was asked to take the medication with food and to stop if she experiences any GI upset. I asked her to call for CP/sob, vomiting, abdominal pain or black/bloody stools. The patient expresses understanding of these issues and questions were answered. Other orders  -     simvastatin (ZOCOR) 10 mg tablet; Take 1 Tab by mouth nightly. -     metoprolol succinate (TOPROL-XL) 25 mg XL tablet; Take 1 Tab by mouth daily. -     hydroCHLOROthiazide (HYDRODIURIL) 25 mg tablet;  Take 1 Tab by mouth daily.  -     amLODIPine (NORVASC) 5 mg tablet; Take 1 Tab by mouth daily. -     lisinopril (PRINIVIL, ZESTRIL) 40 mg tablet; Take 1 Tab by mouth daily. RTC in 6 months. Orders Placed This Encounter    METABOLIC PANEL, COMPREHENSIVE     Standing Status:   Future     Standing Expiration Date:   5/24/2018    LIPID PANEL     Standing Status:   Future     Standing Expiration Date:   5/24/2018    HEMOGLOBIN A1C WITH EAG     Standing Status:   Future     Standing Expiration Date:   5/24/2018    MICROALBUMIN, UR, RAND W/ MICROALBUMIN/CREA RATIO     Standing Status:   Future     Standing Expiration Date:   5/24/2018    simvastatin (ZOCOR) 10 mg tablet     Sig: Take 1 Tab by mouth nightly. Dispense:  90 Tab     Refill:  3    metoprolol succinate (TOPROL-XL) 25 mg XL tablet     Sig: Take 1 Tab by mouth daily. Dispense:  90 Tab     Refill:  1    hydroCHLOROthiazide (HYDRODIURIL) 25 mg tablet     Sig: Take 1 Tab by mouth daily. Dispense:  90 Tab     Refill:  3    amLODIPine (NORVASC) 5 mg tablet     Sig: Take 1 Tab by mouth daily. Dispense:  90 Tab     Refill:  3    lisinopril (PRINIVIL, ZESTRIL) 40 mg tablet     Sig: Take 1 Tab by mouth daily. Dispense:  90 Tab     Refill:  3           ROS:  Negative except as mentioned above  Cardiac-  Pulmonary-  GI-    SH:  Social History   Substance Use Topics    Smoking status: Never Smoker    Smokeless tobacco: None    Alcohol use No         Medications/Allergies:  Current Outpatient Prescriptions on File Prior to Visit   Medication Sig Dispense Refill    cholecalciferol (VITAMIN D3) 1,000 unit cap Take  by mouth daily.  aspirin delayed-release 81 mg tablet Take 1 Tab by mouth daily. 1 Tab 0    multivitamin (ONE A DAY) tablet Take 1 Tab by mouth daily. No current facility-administered medications on file prior to visit.            Allergies   Allergen Reactions    Amoxicillin Anaphylaxis       Objective:  Visit Vitals    /88  Pulse 72    Temp 98.5 °F (36.9 °C) (Oral)    Resp 16    Ht 5' 4\" (1.626 m)    Wt 214 lb (97.1 kg)    BMI 36.73 kg/m2    Body mass index is 36.73 kg/(m^2). Constitutional: Well developed, nourished, no distress, alert   HENT: Exterior ears and tympanic membranes normal bilaterally. Supple neck. No thyromegaly or lymphadenopathy. Oropharynx clear and moist mucous membranes. Eyes: Conjunctiva normal. PERRL. CV: S1, S2.  RRR. No murmurs/rubs. No thrills palpated. No carotid bruits. Intact distal pulses. No edema. Pulm: No abnormalities on inspection. Clear to auscultation bilaterally. No wheezing/rhonchi. Normal effort. GI: Soft, nontender, nondistended. Normal active bowel sounds. No  masses on palpation. No hepatosplenomegaly.

## 2017-05-23 NOTE — PROGRESS NOTES
1. Have you been to the ER, urgent care clinic since your last visit? Hospitalized since your last visit? No.     2. Have you seen or consulted any other health care providers outside of the [facility] since your last visit? Include any pap smears or colon screening.  No.    Patient presents with Complete Physical Exam.

## 2017-05-24 LAB
ALBUMIN SERPL-MCNC: 4.1 G/DL (ref 3.5–5.5)
ALBUMIN/CREAT UR: 5.6 MG/G CREAT (ref 0–30)
ALBUMIN/GLOB SERPL: 1.5 {RATIO} (ref 1.2–2.2)
ALP SERPL-CCNC: 50 IU/L (ref 39–117)
ALT SERPL-CCNC: 16 IU/L (ref 0–32)
AST SERPL-CCNC: 13 IU/L (ref 0–40)
BILIRUB SERPL-MCNC: 0.3 MG/DL (ref 0–1.2)
BUN SERPL-MCNC: 13 MG/DL (ref 6–24)
BUN/CREAT SERPL: 20 (ref 9–23)
CALCIUM SERPL-MCNC: 9.4 MG/DL (ref 8.7–10.2)
CHLORIDE SERPL-SCNC: 102 MMOL/L (ref 96–106)
CHOLEST SERPL-MCNC: 102 MG/DL (ref 100–199)
CO2 SERPL-SCNC: 25 MMOL/L (ref 18–29)
CREAT SERPL-MCNC: 0.65 MG/DL (ref 0.57–1)
CREAT UR-MCNC: 172.2 MG/DL
EST. AVERAGE GLUCOSE BLD GHB EST-MCNC: 140 MG/DL
GLOBULIN SER CALC-MCNC: 2.8 G/DL (ref 1.5–4.5)
GLUCOSE SERPL-MCNC: 123 MG/DL (ref 65–99)
HBA1C MFR BLD: 6.5 % (ref 4.8–5.6)
HDLC SERPL-MCNC: 51 MG/DL
INTERPRETATION, 910389: NORMAL
LDLC SERPL CALC-MCNC: 27 MG/DL (ref 0–99)
MICROALBUMIN UR-MCNC: 9.7 UG/ML
POTASSIUM SERPL-SCNC: 4.1 MMOL/L (ref 3.5–5.2)
PROT SERPL-MCNC: 6.9 G/DL (ref 6–8.5)
SODIUM SERPL-SCNC: 142 MMOL/L (ref 134–144)
TRIGL SERPL-MCNC: 119 MG/DL (ref 0–149)
VLDLC SERPL CALC-MCNC: 24 MG/DL (ref 5–40)

## 2017-11-07 ENCOUNTER — OFFICE VISIT (OUTPATIENT)
Dept: FAMILY MEDICINE CLINIC | Age: 62
End: 2017-11-07

## 2017-11-07 VITALS
HEIGHT: 60 IN | DIASTOLIC BLOOD PRESSURE: 75 MMHG | RESPIRATION RATE: 16 BRPM | HEART RATE: 64 BPM | BODY MASS INDEX: 42.37 KG/M2 | WEIGHT: 215.8 LBS | SYSTOLIC BLOOD PRESSURE: 120 MMHG | TEMPERATURE: 98.7 F

## 2017-11-07 DIAGNOSIS — I10 ESSENTIAL HYPERTENSION: ICD-10-CM

## 2017-11-07 DIAGNOSIS — Z23 ENCOUNTER FOR IMMUNIZATION: ICD-10-CM

## 2017-11-07 DIAGNOSIS — R73.03 PREDIABETES: Primary | ICD-10-CM

## 2017-11-07 DIAGNOSIS — E78.2 MIXED HYPERLIPIDEMIA: ICD-10-CM

## 2017-11-07 DIAGNOSIS — E66.01 OBESITY, MORBID, BMI 40.0-49.9 (HCC): ICD-10-CM

## 2017-11-07 RX ORDER — METFORMIN HYDROCHLORIDE 500 MG/1
500 TABLET, EXTENDED RELEASE ORAL
Qty: 90 TAB | Refills: 3 | Status: SHIPPED | OUTPATIENT
Start: 2017-11-07 | End: 2018-11-05 | Stop reason: SDUPTHER

## 2017-11-07 NOTE — PATIENT INSTRUCTIONS
Learning About Cutting Calories  How do calories affect your weight? Food gives your body energy. Energy from the food you eat is measured in calories. This energy keeps your heart beating, your brain active, and your muscles working. Your body needs a certain number of calories each day. After your body uses the calories it needs, it stores extra calories as fat. To lose weight safely, you have to eat fewer calories while eating in a healthy way. How many calories do you need each day? The more active you are, the more calories you need. When you are less active, you need fewer calories. How many calories you need each day also depends on several things, including your age and whether you are male or female. Here are some general guidelines for adults:  · Less active women and older adults need 1,600 to 2,000 calories each day. · Active women and less active men need 2,000 to 2,400 calories each day. · Active men need 2,400 to 3,000 calories each day. How can you cut calories and eat healthy meals? Whole grains, vegetables and fruits, and dried beans are good lower-calorie foods. They give you lots of nutrients and fiber. And they fill you up. Sweets, energy drinks, and soda pop are high in calories. They give you few nutrients and no fiber. Try to limit soda pop, fruit juice, and energy drinks. Drink water instead. Some fats can be part of a healthy diet. But cutting back on fats from highly processed foods like fast foods and many snack foods is a good way to lower the calories in your diet. Also, use smaller amounts of fats like butter, margarine, salad dressing, and mayonnaise. Add fresh garlic, lemon, or herbs to your meals to add flavor without adding fat. Meats and dairy products can be a big source of hidden fats. Try to choose lean or low-fat versions of these products. Fat-free cookies, candies, chips, and frozen treats can still be high in sugar and calories.  Some fat-free foods have more calories than regular ones. Eat fat-free treats in moderation, as you would other foods. If your favorite foods are high in fat, salt, sugar, or calories, limit how often you eat them. Eat smaller servings, or look for healthy substitutes. Fill up on fruits, vegetables, and whole grains. Eating at home  · Use meat as a side dish instead of as the main part of your meal.  · Try main dishes that use whole wheat pasta, brown rice, dried beans, or vegetables. · Find ways to cook with little or no fat, such as broiling, steaming, or grilling. · Use cooking spray instead of oil. If you use oil, use a monounsaturated oil, such as canola or olive oil. · Trim fat from meats before you cook them. · Drain off fat after you brown the meat or while you roast it. · Chill soups and stews after you cook them. Then skim the fat off the top after it hardens. Eating out  · Order foods that are broiled or poached rather than fried or breaded. · Cut back on the amount of butter or margarine that you use on bread. · Order sauces, gravies, and salad dressings on the side, and use only a little. · When you order pasta, choose tomato sauce rather than cream sauce. · Ask for salsa with your baked potato instead of sour cream, butter, cheese, or coronel. · Order meals in a small size instead of upgrading to a large. · Share an entree, or take part of your food home to eat as another meal.  · Share appetizers and desserts. Where can you learn more? Go to http://yony-uyen.info/. Enter 99 [id number] in the search box to learn more about \"Learning About Cutting Calories. \"  Current as of: May 12, 2017  Content Version: 11.4  © 7612-8035 Healthwise, [place]. Care instructions adapted under license by [employer] (which disclaims liability or warranty for this information).  If you have questions about a medical condition or this instruction, always ask your healthcare professional. [employer] disclaims any warranty or liability for your use of this information. Prediabetes: Care Instructions  Your Care Instructions    Prediabetes is a warning sign that you are at risk for getting type 2 diabetes. It means that your blood sugar is higher than it should be. The food you eat turns into sugar, which your body uses for energy. Normally, an organ called the pancreas makes insulin, which allows the sugar in your blood to get into your body's cells. But when your body can't use insulin the right way, the sugar doesn't move into cells. It stays in your blood instead. This is called insulin resistance. The buildup of sugar in the blood causes prediabetes. The good news is that lifestyle changes may help you get your blood sugar back to normal and help you avoid or delay diabetes. Follow-up care is a key part of your treatment and safety. Be sure to make and go to all appointments, and call your doctor if you are having problems. It's also a good idea to know your test results and keep a list of the medicines you take. How can you care for yourself at home? · Watch your weight. A healthy weight helps your body use insulin properly. · Limit the amount of calories, sweets, and unhealthy fat you eat. Ask your doctor if you should see a dietitian. A registered dietitian can help you create meal plans that fit your lifestyle. · Get at least 30 minutes of exercise on most days of the week. Exercise helps control your blood sugar. It also helps you maintain a healthy weight. Walking is a good choice. You also may want to do other activities, such as running, swimming, cycling, or playing tennis or team sports. · Do not smoke. Smoking can make prediabetes worse. If you need help quitting, talk to your doctor about stop-smoking programs and medicines. These can increase your chances of quitting for good. · If your doctor prescribed medicines, take them exactly as prescribed.  Call your doctor if you think you are having a problem with your medicine. You will get more details on the specific medicines your doctor prescribes. When should you call for help? Watch closely for changes in your health, and be sure to contact your doctor if:  ? · You have any symptoms of diabetes. These may include:  ¨ Being thirsty more often. ¨ Urinating more. ¨ Being hungrier. ¨ Losing weight. ¨ Being very tired. ¨ Having blurry vision. ? · You have a wound that will not heal.   ? · You have an infection that will not go away. ? · You have problems with your blood pressure. ? · You want more information about diabetes and how you can keep from getting it. Where can you learn more? Go to http://yony-uyen.info/. Enter I222 in the search box to learn more about \"Prediabetes: Care Instructions. \"  Current as of: March 13, 2017  Content Version: 11.4  © 1191-2312 [employer]. Care instructions adapted under license by [email or web address] (which disclaims liability or warranty for this information). If you have questions about a medical condition or this instruction, always ask your healthcare professional. [place] [address] any warranty or liability for your use of this information. Learning About Carbohydrates  What are carbohydrates? Carbohydrates are an important nutrient you get from food. It's a great source of energy for your body and helps your brain and nervous system work properly. How does your body use carbohydrates? After you eat food with carbs in it, your body digests the carbohydrates and turns them into a kind of sugar that goes into your blood. The blood carries this sugar to the cells in your body. The cells use the sugar to give you energy. Extra sugar is stored in the cells for later use. If it isn't used, it turns into fat. Where do carbohydrates come from?   The healthiest carbohydrate choices are breads, cereals, and pastas made with whole grains; brown rice; low-fat dairy products; vegetables; legumes such as peas, lentils, and beans; and fruits. Foods made from refined flour, including bread, pasta, doughnuts, cookies, and desserts, also contain carbohydrates. So do sweets such as candy and soda. How can you get the right kind and amount of carbs? Eating too much of anything can lead to weight gain. And that can lead to other health problems. Here are some tips to help you eat the right amount of the right kind of carbs so you have the nutrition and the energy you need:  · Eat 3 to 8 servings of grains (breads, cereals, rice, pasta) each day. For example, a serving is 1 slice of bread, 1 cup of boxed cereal, or ½ cup of cooked rice, cooked pasta, or cooked cereal. Go to www. choosemyplate.gov to learn how many servings you need. ¨ Buy bread that lists whole wheat (or other whole grains), stone-ground wheat, or cracked wheat as the first ingredient. ¨ Eat brown rice, bulgur, or millet instead of white rice. ¨ Eat pasta and cereals made from whole grain flour instead of refined flour. · Eat several servings a day of fresh fruits and vegetables. These include raspberries, apples, figs, oranges, pears, prunes, broccoli, brussels sprouts, carrots, corn, peas, and beans. And there are lots of other fruits and vegetables to choose from. · Limit the amount of candy, desserts, and soda in your diet. Where can you learn more? Go to http://yony-uyen.info/. Enter F199 in the search box to learn more about \"Learning About Carbohydrates. \"  Current as of: May 12, 2017  Content Version: 11.4  © 5880-3737 Healthwise, [employer]. Care instructions adapted under license by [employer] (which disclaims liability or warranty for this information).  If you have questions about a medical condition or this instruction, always ask your healthcare professional. [employer] disclaims any warranty or liability for your use of this information. Metformin (Glucophage, Glucophage XR, Fortamet, Appformin) - (By mouth)   Why this medicine is used:   Treats type 2 diabetes. Contact a nurse or doctor right away if you have:  · Dark urine or pale stools  · Loss of appetite, stomach pain, nausea, vomiting, yellow skin or eyes  · Trouble breathing, slow heartbeat, lightheadedness, dizziness  · Stomach pain, muscle pain or cramping  · Unusual tiredness or weakness, sleepiness     Common side effects:  · Diarrhea, gas  © 2017 [address] Information is for End User's use only and may not be sold, redistributed or otherwise used for commercial purposes. Vaccine Information Statement    Influenza (Flu) Vaccine (Inactivated or Recombinant): What you need to know    Many Vaccine Information Statements are available in [place]ian and other languages. See www.immunize.org/vis  Hojas de Información Sobre Vacunas están disponibles en Español y en muchos otros idiomas. Visite www.immunize.org/vis    1. Why get vaccinated? Influenza (flu) is a contagious disease that spreads around the [facility] every year, usually between October and May. Flu is caused by influenza viruses, and is spread mainly by coughing, sneezing, and close contact. Anyone can get flu. Flu strikes suddenly and can last several days. Symptoms vary by age, but can include:   fever/chills   sore throat   muscle aches   fatigue   cough   headache    runny or stuffy nose    Flu can also lead to pneumonia and blood infections, and cause diarrhea and seizures in children. If you have a medical condition, such as heart or lung disease, flu can make it worse. Flu is more dangerous for some people. Infants and young children, people 72years of age and older, pregnant women, and people with certain health conditions or a weakened immune system are at greatest risk.       Each year thousands of people in the [facility] die from flu, and many more are hospitalized. Flu vaccine can:   keep you from getting flu,   make flu less severe if you do get it, and   keep you from spreading flu to your family and other people. 2. Inactivated and recombinant flu vaccines    A dose of flu vaccine is recommended every flu season. Children 6 months through 6years of age may need two doses during the same flu season. Everyone else needs only one dose each flu season. Some inactivated flu vaccines contain a very small amount of a mercury-based preservative called thimerosal. Studies have not shown thimerosal in vaccines to be harmful, but flu vaccines that do not contain thimerosal are available. There is no live flu virus in flu shots. They cannot cause the flu. There are many flu viruses, and they are always changing. Each year a new flu vaccine is made to protect against three or four viruses that are likely to cause disease in the upcoming flu season. But even when the vaccine doesnt exactly match these viruses, it may still provide some protection    Flu vaccine cannot prevent:   flu that is caused by a virus not covered by the vaccine, or   illnesses that look like flu but are not. It takes about 2 weeks for protection to develop after vaccination, and protection lasts through the flu season. 3. Some people should not get this vaccine    Tell the person who is giving you the vaccine:     If you have any severe, life-threatening allergies. If you ever had a life-threatening allergic reaction after a dose of flu vaccine, or have a severe allergy to any part of this vaccine, you may be advised not to get vaccinated. Most, but not all, types of flu vaccine contain a small amount of egg protein.  If you ever had Guillain-Barré Syndrome (also called GBS). Some people with a history of GBS should not get this vaccine. This should be discussed with your doctor.  If you are not feeling well.     It is usually okay to get flu vaccine when you have a mild illness, but you might be asked to come back when you feel better. 4. Risks of a vaccine reaction    With any medicine, including vaccines, there is a chance of reactions. These are usually mild and go away on their own, but serious reactions are also possible. Most people who get a flu shot do not have any problems with it. Minor problems following a flu shot include:    soreness, redness, or swelling where the shot was given     hoarseness   sore, red or itchy eyes   cough   fever   aches   headache   itching   fatigue  If these problems occur, they usually begin soon after the shot and last 1 or 2 days. More serious problems following a flu shot can include the following:     There may be a small increased risk of Guillain-Barré Syndrome (GBS) after inactivated flu vaccine. This risk has been estimated at 1 or 2 additional cases per million people vaccinated. This is much lower than the risk of severe complications from flu, which can be prevented by flu vaccine.  Young children who get the flu shot along with pneumococcal vaccine (PCV13) and/or DTaP vaccine at the same time might be slightly more likely to have a seizure caused by fever. Ask your doctor for more information. Tell your doctor if a child who is getting flu vaccine has ever had a seizure. Problems that could happen after any injected vaccine:      People sometimes faint after a medical procedure, including vaccination. Sitting or lying down for about 15 minutes can help prevent fainting, and injuries caused by a fall. Tell your doctor if you feel dizzy, or have vision changes or ringing in the ears.  Some people get severe pain in the shoulder and have difficulty moving the arm where a shot was given. This happens very rarely.  Any medication can cause a severe allergic reaction.  Such reactions from a vaccine are very rare, estimated at about 1 in a million doses, and would happen within a few minutes to a few hours after the vaccination. As with any medicine, there is a very remote chance of a vaccine causing a serious injury or death. The safety of vaccines is always being monitored. For more information, visit: www.cdc.gov/vaccinesafety/    5. What if there is a serious reaction? What should I look for?  Look for anything that concerns you, such as signs of a severe allergic reaction, very high fever, or unusual behavior. Signs of a severe allergic reaction can include hives, swelling of the face and throat, difficulty breathing, a fast heartbeat, dizziness, and weakness  usually within a few minutes to a few hours after the vaccination. What should I do?  If you think it is a severe allergic reaction or other emergency that cant wait, call 9-1-1 and get the person to the nearest hospital. Otherwise, call your doctor.  Reactions should be reported to the Vaccine Adverse Event Reporting System (VAERS). Your doctor should file this report, or you can do it yourself through  the VAERS web site at www.vaers. [facility].gov, or by calling 7-326.961.7108. VAERS does not give medical advice. 6. The National Vaccine Injury Compensation Program    The [facility] Vaccine Injury Compensation Program (VICP) is a federal program that was created to compensate people who may have been injured by certain vaccines. Persons who believe they may have been injured by a vaccine can learn about the program and about filing a claim by calling 7-326.570.8181 or visiting the [address] [employer] website at www.[facility].gov/vaccinecompensation. There is a time limit to file a claim for compensation. 7. How can I learn more?  Ask your healthcare provider. He or she can give you the vaccine package insert or suggest other sources of information.  Call your local or state health department.    Contact the Centers for Disease Control and Prevention (CDC):  - Call 9-997-354-397-659-2677 (8-171-JNX-INFO) or  - Visit CDCs website at www.cdc.gov/flu    Vaccine Information Statement   Inactivated Influenza Vaccine   8/7/2015  [address] YAZ[address] [facility] [id number]    Department of Health and Human Services  Centers for Disease Control and Prevention    Office Use Only

## 2017-11-07 NOTE — PROGRESS NOTES
1. Have you been to the ER, urgent care clinic since your last visit? Hospitalized since your last visit? Yes, went to [facility] in 10/2017 for pain right side abdomen. 2. Have you seen or consulted any other health care providers outside of the [address] [place] [address] since your last visit? Include any pap smears or colon screening.  No.     Chief Complaint   Patient presents with    Follow Up Chronic Condition    Hypertension    Cholesterol Problem    Blood sugar problem

## 2017-11-07 NOTE — PROGRESS NOTES
Gavin Cartwright is a 63 y.o. female and presents with Follow Up Chronic Condition; Hypertension; Cholesterol Problem; and Blood sugar problem       Subjective:    htn- doing well. On meds today. Prediabetes- gained wt. Trying to watch diet- changed portions/eating less. Obesity- trying to cut calories. Assessment/Plan:    Obesity - encouraged caloric restriction, discussed meds/programs/surgery. Goal 1 lb /week wt loss.   htn- well controlled - on meds- no changes. Prediabetes- recheck A1c. Add metformin and recheck A1c in 3 months. RTC in 3 mo. Orders Placed This Encounter    Influenza virus vaccine (QUADRIVALENT PRES FREE SYRINGE) IM (82016)    metFORMIN ER (GLUCOPHAGE XR) 500 mg tablet     Sig: Take 1 Tab by mouth daily (with dinner). Dispense:  90 Tab     Refill:  3       Diagnoses and all orders for this visit:    1. Prediabetes    2. Mixed hyperlipidemia    3. Essential hypertension    4. Obesity, morbid, BMI 40.0-49.9 ([facility] [address]    5. Encounter for immunization  -     Influenza virus vaccine (QUADRIVALENT PRES FREE SYRINGE) IM ([id number])    Other orders  -     metFORMIN ER (GLUCOPHAGE XR) 500 mg tablet; Take 1 Tab by mouth daily (with dinner). ROS:  Negative except as mentioned above  Cardiac- no chest pain or palpitations  Pulmonary- no sob or wheezes  GI- no n/v or diarrhea. SH:  Social History   Substance Use Topics    Smoking status: Never Smoker    Smokeless tobacco: Never Used    Alcohol use No         Medications/Allergies:  Current Outpatient Prescriptions on File Prior to Visit   Medication Sig Dispense Refill    simvastatin (ZOCOR) 10 mg tablet Take 1 Tab by mouth nightly. 90 Tab 3    hydroCHLOROthiazide (HYDRODIURIL) 25 mg tablet Take 1 Tab by mouth daily. 90 Tab 3    amLODIPine (NORVASC) 5 mg tablet Take 1 Tab by mouth daily. 90 Tab 3    lisinopril (PRINIVIL, ZESTRIL) 40 mg tablet Take 1 Tab by mouth daily.  90 Tab 3    aspirin delayed-release 81 mg tablet Take 1 Tab by mouth daily. 1 Tab 0    multivitamin (ONE A DAY) tablet Take 1 Tab by mouth daily.  methocarbamol (ROBAXIN-750) 750 mg tablet Take 2 Tabs by mouth four (4) times daily as needed for Pain. 20 Tab 0     No current facility-administered medications on file prior to visit. Allergies   Allergen Reactions    Amoxicillin Anaphylaxis       Objective:  Visit Vitals    /75    Pulse 64    Temp 98.7 °F (37.1 °C) (Oral)    Resp 16    Ht 5' (1.524 m)    Wt 215 lb 12.8 oz (97.9 kg)    BMI 42.15 kg/m2    Body mass index is 42.15 kg/(m^2). Constitutional: Well developed, nourished, no distress, alert   HENT: Exterior ears and tympanic membranes normal bilaterally. Supple neck. No thyromegaly or lymphadenopathy. Oropharynx clear and moist mucous membranes. Eyes: Conjunctiva normal. PERRL. CV: S1, S2.  RRR. No murmurs/rubs. No thrills palpated. No carotid bruits. Intact distal pulses. No edema. Pulm: No abnormalities on inspection. Clear to auscultation bilaterally. No wheezing/rhonchi. Normal effort. GI: Soft, nontender, nondistended. Normal active bowel sounds. No  masses on palpation. No hepatosplenomegaly.

## 2017-11-07 NOTE — MR AVS SNAPSHOT
Visit Information Date & Time Provider Department Dept. Phone Encounter #  
 11/7/2017  8:15 AM Ani Alonzo, [address] [facility] [phone] [id number] Follow-up Instructions Return in about 3 months (around 2/7/2018) for with labs prior. Upcoming Health Maintenance Date Due Hepatitis C Screening 1955 COLONOSCOPY 11/26/1975 Pneumococcal 19-64 Medium Risk (1 of 1 - PPSV23) 11/26/1976 EYE EXAM RETINAL OR DILATED Q1 11/20/2015 FOOT EXAM Q1 2/25/2016 INFLUENZA AGE 9 TO ADULT 8/1/2017 ZOSTER VACCINE AGE 60> 9/26/2017 HEMOGLOBIN A1C Q6M 11/23/2017 PAP AKA CERVICAL CYTOLOGY 2/25/2018 MICROALBUMIN Q1 5/23/2018 LIPID PANEL Q1 5/23/2018 BREAST CANCER SCRN MAMMOGRAM 7/22/2018 DTaP/Tdap/Td series (2 - Td) 11/12/2024 Allergies as of 11/7/2017  Review Complete On: 11/7/2017 By: Ani Alonzo MD  
  
 Severity Noted Reaction Type Reactions Amoxicillin High 08/28/2014   Intolerance Anaphylaxis Current Immunizations  Reviewed on 12/30/2016 Name Date Influenza Vaccine 11/8/2012 Influenza Vaccine Ally Sandhya) 12/30/2016, 10/5/2015 Influenza Vaccine (Quad) PF  Incomplete Tdap 11/12/2014  5:00 PM  
  
 Not reviewed this visit You Were Diagnosed With   
  
 Codes Comments Prediabetes    -  Primary ICD-10-CM: R73.03 
ICD-9-CM: 790.29 Mixed hyperlipidemia     ICD-10-CM: E78.2 ICD-9-CM: 272.2 Essential hypertension     ICD-10-CM: I10 
ICD-9-CM: 401.9 Obesity, morbid, BMI 40.0-49.9 (HCC)     ICD-10-CM: E66.01 
ICD-9-CM: 278.01 Encounter for immunization     ICD-10-CM: K88 ICD-9-CM: V03.89 Vitals BP Pulse Temp Resp Height(growth percentile) Weight(growth percentile) 120/75 64 98.7 °F (37.1 °C) (Oral) 16 5' (1.524 m) 215 lb 12.8 oz (97.9 kg) BMI OB Status Smoking Status 42.15 kg/m2 Hysterectomy Never Smoker BMI and BSA Data Body Mass Index Body Surface Area 42.15 kg/m 2 2.04 m 2 Preferred Pharmacy Pharmacy Name Phone [facility] [address] Your Updated Medication List  
  
   
This list is accurate as of: 11/7/17  8:38 AM.  Always use your most recent med list. amLODIPine 5 mg tablet Commonly known as:  Elspeth Bakes Take 1 Tab by mouth daily. aspirin delayed-release 81 mg tablet Take 1 Tab by mouth daily. hydroCHLOROthiazide 25 mg tablet Commonly known as:  HYDRODIURIL Take 1 Tab by mouth daily. lisinopril 40 mg tablet Commonly known as:  Svetlana Primmer Take 1 Tab by mouth daily. metFORMIN  mg tablet Commonly known as:  GLUCOPHAGE XR Take 1 Tab by mouth daily (with dinner). multivitamin tablet Commonly known as:  ONE A DAY Take 1 Tab by mouth daily. simvastatin 10 mg tablet Commonly known as:  ZOCOR Take 1 Tab by mouth nightly. Prescriptions Sent to Pharmacy Refills  
 metFORMIN ER (GLUCOPHAGE XR) 500 mg tablet 3 Sig: Take 1 Tab by mouth daily (with dinner). Class: Normal  
 Pharmacy: [facility] [address] [facility] [address], [address] [place] [address] Pkwy Ph #: [phone] Route: Oral  
  
We Performed the Following INFLUENZA VIRUS VAC QUAD,SPLIT,PRESV FREE SYRINGE IM [id number] CPT(R)] Follow-up Instructions Return in about 3 months (around 2/7/2018) for with labs prior. Patient Instructions Learning About Cutting Calories How do calories affect your weight? Food gives your body energy. Energy from the food you eat is measured in calories. This energy keeps your heart beating, your brain active, and your muscles working. Your body needs a certain number of calories each day. After your body uses the calories it needs, it stores extra calories as fat. To lose weight safely, you have to eat fewer calories while eating in a healthy way. How many calories do you need each day? The more active you are, the more calories you need. When you are less active, you need fewer calories. How many calories you need each day also depends on several things, including your age and whether you are male or female. Here are some general guidelines for adults: 
· Less active women and older adults need 1,600 to 2,000 calories each day. · Active women and less active men need 2,000 to 2,400 calories each day. · Active men need 2,400 to 3,000 calories each day. How can you cut calories and eat healthy meals? Whole grains, vegetables and fruits, and dried beans are good lower-calorie foods. They give you lots of nutrients and fiber. And they fill you up. Sweets, energy drinks, and soda pop are high in calories. They give you few nutrients and no fiber. Try to limit soda pop, fruit juice, and energy drinks. Drink water instead. Some fats can be part of a healthy diet. But cutting back on fats from highly processed foods like fast foods and many snack foods is a good way to lower the calories in your diet. Also, use smaller amounts of fats like butter, margarine, salad dressing, and mayonnaise. Add fresh garlic, lemon, or herbs to your meals to add flavor without adding fat. Meats and dairy products can be a big source of hidden fats. Try to choose lean or low-fat versions of these products. Fat-free cookies, candies, chips, and frozen treats can still be high in sugar and calories. Some fat-free foods have more calories than regular ones. Eat fat-free treats in moderation, as you would other foods. If your favorite foods are high in fat, salt, sugar, or calories, limit how often you eat them. Eat smaller servings, or look for healthy substitutes. Fill up on fruits, vegetables, and whole grains. Eating at home · Use meat as a side dish instead of as the main part of your meal. 
· Try main dishes that use whole wheat pasta, brown rice, dried beans, or vegetables. · Find ways to cook with little or no fat, such as broiling, steaming, or grilling. · Use cooking spray instead of oil. If you use oil, use a monounsaturated oil, such as canola or olive oil. · Trim fat from meats before you cook them. · Drain off fat after you brown the meat or while you roast it. · Chill soups and stews after you cook them. Then skim the fat off the top after it hardens. Eating out · Order foods that are broiled or poached rather than fried or breaded. · Cut back on the amount of butter or margarine that you use on bread. · Order sauces, gravies, and salad dressings on the side, and use only a little. · When you order pasta, choose tomato sauce rather than cream sauce. · Ask for salsa with your baked potato instead of sour cream, butter, cheese, or coronel. · Order meals in a small size instead of upgrading to a large. · Share an entree, or take part of your food home to eat as another meal. 
· Share appetizers and desserts. Where can you learn more? Go to http://yonySpirus Medicaluyen.info/. Enter 99 [id number] in the search box to learn more about \"Learning About Cutting Calories. \" Current as of: May 12, 2017 Content Version: 11.4 © 9107-8818 [employer]. Care instructions adapted under license by [employer] (which disclaims liability or warranty for this information). If you have questions about a medical condition or this instruction, always ask your healthcare professional. [place] [address] any warranty or liability for your use of this information. Prediabetes: Care Instructions Your Care Instructions Prediabetes is a warning sign that you are at risk for getting type 2 diabetes. It means that your blood sugar is higher than it should be. The food you eat turns into sugar, which your body uses for energy.  Normally, an organ called the pancreas makes insulin, which allows the sugar in your blood to get into your body's cells. But when your body can't use insulin the right way, the sugar doesn't move into cells. It stays in your blood instead. This is called insulin resistance. The buildup of sugar in the blood causes prediabetes. The good news is that lifestyle changes may help you get your blood sugar back to normal and help you avoid or delay diabetes. Follow-up care is a key part of your treatment and safety. Be sure to make and go to all appointments, and call your doctor if you are having problems. It's also a good idea to know your test results and keep a list of the medicines you take. How can you care for yourself at home? · Watch your weight. A healthy weight helps your body use insulin properly. · Limit the amount of calories, sweets, and unhealthy fat you eat. Ask your doctor if you should see a dietitian. A registered dietitian can help you create meal plans that fit your lifestyle. · Get at least 30 minutes of exercise on most days of the week. Exercise helps control your blood sugar. It also helps you maintain a healthy weight. Walking is a good choice. You also may want to do other activities, such as running, swimming, cycling, or playing tennis or team sports. · Do not smoke. Smoking can make prediabetes worse. If you need help quitting, talk to your doctor about stop-smoking programs and medicines. These can increase your chances of quitting for good. · If your doctor prescribed medicines, take them exactly as prescribed. Call your doctor if you think you are having a problem with your medicine. You will get more details on the specific medicines your doctor prescribes. When should you call for help? Watch closely for changes in your health, and be sure to contact your doctor if: 
? · You have any symptoms of diabetes. These may include: ¨ Being thirsty more often. ¨ Urinating more. ¨ Being hungrier. ¨ Losing weight. ¨ Being very tired. ¨ Having blurry vision. ? · You have a wound that will not heal.  
? · You have an infection that will not go away. ? · You have problems with your blood pressure. ? · You want more information about diabetes and how you can keep from getting it. Where can you learn more? Go to http://yony-uyen.info/. Enter I222 in the search box to learn more about \"Prediabetes: Care Instructions. \" Current as of: March 13, 2017 Content Version: 11.4 © 3698-7223 [employer]. Care instructions adapted under license by [employer] (which disclaims liability or warranty for this information). If you have questions about a medical condition or this instruction, always ask your healthcare professional. [address] any warranty or liability for your use of this information. Learning About Carbohydrates What are carbohydrates? Carbohydrates are an important nutrient you get from food. It's a great source of energy for your body and helps your brain and nervous system work properly. How does your body use carbohydrates? After you eat food with carbs in it, your body digests the carbohydrates and turns them into a kind of sugar that goes into your blood. The blood carries this sugar to the cells in your body. The cells use the sugar to give you energy. Extra sugar is stored in the cells for later use. If it isn't used, it turns into fat. Where do carbohydrates come from? The healthiest carbohydrate choices are breads, cereals, and pastas made with whole grains; brown rice; low-fat dairy products; vegetables; legumes such as peas, lentils, and beans; and fruits. Foods made from refined flour, including bread, pasta, doughnuts, cookies, and desserts, also contain carbohydrates. So do sweets such as candy and soda. How can you get the right kind and amount of carbs? Eating too much of anything can lead to weight gain. And that can lead to other health problems. Here are some tips to help you eat the right amount of the right kind of carbs so you have the nutrition and the energy you need: 
· Eat 3 to 8 servings of grains (breads, cereals, rice, pasta) each day. For example, a serving is 1 slice of bread, 1 cup of boxed cereal, or ½ cup of cooked rice, cooked pasta, or cooked cereal. Go to www. choosemyplate.gov to learn how many servings you need. ¨ Buy bread that lists whole wheat (or other whole grains), stone-ground wheat, or cracked wheat as the first ingredient. ¨ Eat brown rice, bulgur, or millet instead of white rice. ¨ Eat pasta and cereals made from whole grain flour instead of refined flour. · Eat several servings a day of fresh fruits and vegetables. These include raspberries, apples, figs, oranges, pears, prunes, broccoli, brussels sprouts, carrots, corn, peas, and beans. And there are lots of other fruits and vegetables to choose from. · Limit the amount of candy, desserts, and soda in your diet. Where can you learn more? Go to http://yonyConcordia Healthcareuyen.info/. Enter F199 in the search box to learn more about \"Learning About Carbohydrates. \" Current as of: May 12, 2017 Content Version: 11.4 © 4237-4997 [employer]. Care instructions adapted under license by [employer] (which disclaims liability or warranty for this information). If you have questions about a medical condition or this instruction, always ask your healthcare professional. [place] [address] any warranty or liability for your use of this information. Metformin (Glucophage, Glucophage XR, Fortamet, Appformin) - (By mouth) Why this medicine is used:  
Treats type 2 diabetes. Contact a nurse or doctor right away if you have: · Dark urine or pale stools · Loss of appetite, stomach pain, nausea, vomiting, yellow skin or eyes · Trouble breathing, slow heartbeat, lightheadedness, dizziness · Stomach pain, muscle pain or cramping · Unusual tiredness or weakness, sleepiness Common side effects: 
· Diarrhea, gas © 2017 [facility] Information is for End User's use only and may not be sold, redistributed or otherwise used for commercial purposes. Vaccine Information Statement Influenza (Flu) Vaccine (Inactivated or Recombinant): What you need to know Many Vaccine Information Statements are available in Yoruba and other languages. See www.immunize.org/vis Hojas de Información Sobre Vacunas están disponibles en Español y en muchos otros idiomas. Visite www.immunize.org/vis 1. Why get vaccinated? Influenza (flu) is a contagious disease that spreads around the [facility] every year, usually between October and May. Flu is caused by influenza viruses, and is spread mainly by coughing, sneezing, and close contact. Anyone can get flu. Flu strikes suddenly and can last several days. Symptoms vary by age, but can include: 
 fever/chills  sore throat  muscle aches  fatigue  cough  headache  runny or stuffy nose Flu can also lead to pneumonia and blood infections, and cause diarrhea and seizures in children. If you have a medical condition, such as heart or lung disease, flu can make it worse. Flu is more dangerous for some people. Infants and young children, people 72years of age and older, pregnant women, and people with certain health conditions or a weakened immune system are at greatest risk. Each year thousands of people in the [facility] die from flu, and many more are hospitalized. Flu vaccine can: 
 keep you from getting flu, 
 make flu less severe if you do get it, and 
 keep you from spreading flu to your family and other people. 2. Inactivated and recombinant flu vaccines A dose of flu vaccine is recommended every flu season. Children 6 months through 6years of age may need two doses during the same flu season. Everyone else needs only one dose each flu season. Some inactivated flu vaccines contain a very small amount of a mercury-based preservative called thimerosal. Studies have not shown thimerosal in vaccines to be harmful, but flu vaccines that do not contain thimerosal are available. There is no live flu virus in flu shots. They cannot cause the flu. There are many flu viruses, and they are always changing. Each year a new flu vaccine is made to protect against three or four viruses that are likely to cause disease in the upcoming flu season. But even when the vaccine doesnt exactly match these viruses, it may still provide some protection Flu vaccine cannot prevent: 
 flu that is caused by a virus not covered by the vaccine, or 
 illnesses that look like flu but are not. It takes about 2 weeks for protection to develop after vaccination, and protection lasts through the flu season. 3. Some people should not get this vaccine Tell the person who is giving you the vaccine:  If you have any severe, life-threatening allergies. If you ever had a life-threatening allergic reaction after a dose of flu vaccine, or have a severe allergy to any part of this vaccine, you may be advised not to get vaccinated. Most, but not all, types of flu vaccine contain a small amount of egg protein.  If you ever had Guillain-Barré Syndrome (also called GBS). Some people with a history of GBS should not get this vaccine. This should be discussed with your doctor.  If you are not feeling well. It is usually okay to get flu vaccine when you have a mild illness, but you might be asked to come back when you feel better. 4. Risks of a vaccine reaction With any medicine, including vaccines, there is a chance of reactions. These are usually mild and go away on their own, but serious reactions are also possible. Most people who get a flu shot do not have any problems with it. Minor problems following a flu shot include:  
 soreness, redness, or swelling where the shot was given  hoarseness  sore, red or itchy eyes  cough  fever  aches  headache  itching  fatigue If these problems occur, they usually begin soon after the shot and last 1 or 2 days. More serious problems following a flu shot can include the following:  There may be a small increased risk of Guillain-Barré Syndrome (GBS) after inactivated flu vaccine. This risk has been estimated at 1 or 2 additional cases per million people vaccinated. This is much lower than the risk of severe complications from flu, which can be prevented by flu vaccine.  Young children who get the flu shot along with pneumococcal vaccine (PCV13) and/or DTaP vaccine at the same time might be slightly more likely to have a seizure caused by fever. Ask your doctor for more information. Tell your doctor if a child who is getting flu vaccine has ever had a seizure. Problems that could happen after any injected vaccine:  People sometimes faint after a medical procedure, including vaccination. Sitting or lying down for about 15 minutes can help prevent fainting, and injuries caused by a fall. Tell your doctor if you feel dizzy, or have vision changes or ringing in the ears.  Some people get severe pain in the shoulder and have difficulty moving the arm where a shot was given. This happens very rarely.  Any medication can cause a severe allergic reaction. Such reactions from a vaccine are very rare, estimated at about 1 in a million doses, and would happen within a few minutes to a few hours after the vaccination. As with any medicine, there is a very remote chance of a vaccine causing a serious injury or death. The safety of vaccines is always being monitored. For more information, visit: www.cdc.gov/vaccinesafety/ 
 
5. What if there is a serious reaction? What should I look for?  Look for anything that concerns you, such as signs of a severe allergic reaction, very high fever, or unusual behavior. Signs of a severe allergic reaction can include hives, swelling of the face and throat, difficulty breathing, a fast heartbeat, dizziness, and weakness  usually within a few minutes to a few hours after the vaccination. What should I do?  If you think it is a severe allergic reaction or other emergency that cant wait, call 9-1-1 and get the person to the nearest hospital. Otherwise, call your doctor.  Reactions should be reported to the Vaccine Adverse Event Reporting System (VAERS). Your doctor should file this report, or you can do it yourself through  the VAERS web site at www.vaers. [facility].gov, or by calling 3-507.359.7339. VAERS does not give medical advice. 6. The National Vaccine Injury Compensation Program 
 
The [facility] Vaccine Injury Compensation Program (VICP) is a federal program that was created to compensate people who may have been injured by certain vaccines. Persons who believe they may have been injured by a vaccine can learn about the program and about filing a claim by calling 7-878.671.9102 or visiting the [employer] [address] website at www.[facility].gov/vaccinecompensation. There is a time limit to file a claim for compensation. 7. How can I learn more?  Ask your healthcare provider. He or she can give you the vaccine package insert or suggest other sources of information.  Call your local or state health department.  Contact the Centers for Disease Control and Prevention (CDC): 
- Call 5-231.663.8768 (1-800-CDC-INFO) or 
- Visit CDCs website at www.cdc.gov/flu Vaccine Information Statement Inactivated Influenza Vaccine 8/7/2015 
[address] Me[address]ia [id number] Department of [facility] and [employer] Centers for Disease Control and Prevention Office Use Only Introducing [facility]! Bakari Marie introduces [employer] patient portal. Now you can access parts of your medical record, email your doctor's office, and request medication refills online. 1. In your internet browser, go to [email or web address]. [email or web address] 2. Click on the First Time User? Click Here link in the Sign In box. You will see the New Member Sign Up page. 3. Enter your [employer] Access Code exactly as it appears below. You will not need to use this code after youve completed the sign-up process. If you do not sign up before the expiration date, you must request a new code. · [employer] Access Code: [email or web address] Expires: 12/3/2017  9:15 AM 
 
4. Enter the last four digits of your Social Security Number (xxxx) and Date of Birth (mm/dd/yyyy) as indicated and click Submit. You will be taken to the next sign-up page. 5. Create a [employer] ID. This will be your [employer] login ID and cannot be changed, so think of one that is secure and easy to remember. 6. Create a [employer] password. You can change your password at any time. 7. Enter your Password Reset Question and Answer. This can be used at a later time if you forget your password. 8. Enter your e-mail address. You will receive e-mail notification when new information is available in [address] 9. Click Sign Up. You can now view and download portions of your medical record. 10. Click the Download Summary menu link to download a portable copy of your medical information. If you have questions, please visit the Frequently Asked Questions section of the [employer] website. Remember, [employer] is NOT to be used for urgent needs. For medical emergencies, dial 911. Now available from your iPhone and Android! Please provide this summary of care documentation to your next provider. Your primary care clinician is listed as KEVEN Smith. If you have any questions after today's visit, please call [phone].

## 2018-02-07 ENCOUNTER — OFFICE VISIT (OUTPATIENT)
Dept: FAMILY MEDICINE CLINIC | Age: 63
End: 2018-02-07

## 2018-02-07 VITALS
RESPIRATION RATE: 16 BRPM | TEMPERATURE: 99.2 F | BODY MASS INDEX: 37.11 KG/M2 | HEIGHT: 64 IN | SYSTOLIC BLOOD PRESSURE: 131 MMHG | DIASTOLIC BLOOD PRESSURE: 75 MMHG | WEIGHT: 217.4 LBS | HEART RATE: 73 BPM

## 2018-02-07 DIAGNOSIS — E78.2 MIXED HYPERLIPIDEMIA: ICD-10-CM

## 2018-02-07 DIAGNOSIS — I10 ESSENTIAL HYPERTENSION: Primary | ICD-10-CM

## 2018-02-07 DIAGNOSIS — L91.8 ACROCHORDON: ICD-10-CM

## 2018-02-07 DIAGNOSIS — R73.03 PREDIABETES: ICD-10-CM

## 2018-02-07 NOTE — PROGRESS NOTES
1. Have you been to the ER, urgent care clinic since your last visit? Hospitalized since your last visit? No.     2. Have you seen or consulted any other health care providers outside of the [address] [place] [address] since your last visit? Include any pap smears or colon screening.  No.    Chief Complaint   Patient presents with    Follow Up Chronic Condition    Blood sugar problem    Hypertension    Obesity    Results     labs

## 2018-02-07 NOTE — MR AVS SNAPSHOT
Anton Morgan [place] [address] [facility] [address] Located within [facility] 83 [id number] 
[phone] Patient: Paola Armas MRN: [id number] :[id number] Visit Information Date & Time Provider Department Dept. Phone Encounter #  
 2018  8:45 AM Abby Mcgowan, [address] [place] [address] [phone] [id number] Follow-up Instructions Return in about 6 months (around 2018) for with labs prior. Kiesha Crouch Upcoming Health Maintenance Date Due COLONOSCOPY 1975 Pneumococcal 19-64 Medium Risk (1 of 1 - PPSV23) 1976 EYE EXAM RETINAL OR DILATED Q1 2015 FOOT EXAM Q1 2016 ZOSTER VACCINE AGE 60> 2017 PAP AKA CERVICAL CYTOLOGY 2018 MICROALBUMIN Q1 2018 BREAST CANCER SCRN MAMMOGRAM 2018 HEMOGLOBIN A1C Q6M 2018 LIPID PANEL Q1 2019 DTaP/Tdap/Td series (2 - Td) 2024 Allergies as of 2018  Review Complete On: 2018 By: Abby Mcgowan MD  
  
 Severity Noted Reaction Type Reactions Amoxicillin High 2014   Intolerance Anaphylaxis Current Immunizations  Reviewed on 2016 Name Date Influenza Vaccine 2012 Influenza Vaccine Peggi Olayinka) 2016, 10/5/2015 Influenza Vaccine (Quad) PF 2017 Tdap 2014  5:00 PM  
  
 Not reviewed this visit You Were Diagnosed With   
  
 Codes Comments Essential hypertension    -  Primary ICD-10-CM: I10 
ICD-9-CM: 401.9 Mixed hyperlipidemia     ICD-10-CM: E78.2 ICD-9-CM: 272.2 Prediabetes     ICD-10-CM: R73.03 
ICD-9-CM: 790.29 Obesity, Class II, BMI 35-39.9, with comorbidity     ICD-10-CM: E66.9 ICD-9-CM: 278.00 Acrochordon     ICD-10-CM: L91.8 ICD-9-CM: 701.9 Vitals BP Pulse Temp Resp Height(growth percentile) Weight(growth percentile) 131/75 73 99.2 °F (37.3 °C) (Oral) 16 5' 4\" (1.626 m) 217 lb 6.4 oz (98.6 kg) BMI OB Status Smoking Status 37.32 kg/m2 Hysterectomy Never Smoker Vitals History BMI and BSA Data Body Mass Index Body Surface Area  
 37.32 kg/m 2 2.11 m 2 Preferred Pharmacy Pharmacy Name Phone [address], [address] Your Updated Medication List  
  
   
This list is accurate as of: 2/7/18 10:00 AM.  Always use your most recent med list. amLODIPine 5 mg tablet Commonly known as:  Rulon Vika Take 1 Tab by mouth daily. aspirin delayed-release 81 mg tablet Take 1 Tab by mouth daily. hydroCHLOROthiazide 25 mg tablet Commonly known as:  HYDRODIURIL Take 1 Tab by mouth daily. lisinopril 40 mg tablet Commonly known as:  Fredi Fred[place] Take 1 Tab by mouth daily. metFORMIN  mg tablet Commonly known as:  GLUCOPHAGE XR Take 1 Tab by mouth daily (with dinner). metoprolol succinate 25 mg XL tablet Commonly known as:  TOPROL-XL  
TAKE 1 TABLET BY MOUTH DAILY. multivitamin tablet Commonly known as:  ONE A DAY Take 1 Tab by mouth daily. simvastatin 10 mg tablet Commonly known as:  ZOCOR Take 1 Tab by mouth nightly. We Performed the Following REFERRAL TO BARIATRIC SURGERY [MZK390 Custom] Comments:  
 Dr Marci Chua if possible but based on her insurance requirement. Follow-up Instructions Return in about 6 months (around 8/7/2018) for with labs prior. .  
  
  
Referral Information Referral ID Referred By Referred To  
  
 [id number] FEDERICO Yeh Not Available Visits Status Start Date End Date 1 New Request 2/7/18 2/7/19 If your referral has a status of pending review or denied, additional information will be sent to support the outcome of this decision. Patient Instructions Learning About Bariatric Surgery What is bariatric surgery? Bariatric surgery is surgery to help you lose weight.  This type of surgery is only used for people who are very overweight and have not been able to lose weight with diet and exercise. This surgery makes the stomach smaller. Some types of surgery also change the connection between your stomach and intestines. How is bariatric surgery done? Bariatric surgery may be either \"open\" or \"laparoscopic. \" Open surgery is done through a large cut (incision) in the belly. Laparoscopic surgery is done through several small cuts. The doctor puts a lighted tube, or scope, and other surgical tools through small cuts in your belly. The doctor is able to see your organs with the scope. There are different types of bariatric surgery. Gastric sleeve surgery The surgery is usually done through several small incisions in the belly. The doctor removes more than half of your stomach. This leaves a thin sleeve, or tube, that is about the size of a banana. Because part of your stomach has been removed, this can't be reversed. Timo-en-Y gastric bypass surgery Timo-en-Y (say \"doris-en-why\") surgery changes the connection between the stomach and the intestines. The doctor separates a section of your stomach from the rest of your stomach. This makes a small pouch. The new pouch will hold the food you eat. The doctor connects the stomach pouch to the middle part of the small intestine. Gastric banding surgery The surgery is usually done through several small incisions in the belly. The doctor wraps a band around the upper part of the stomach. This creates a small pouch. The small size of the pouch means that you will get full after you eat just a small amount of food. The doctor can inflate or deflate the band to adjust the size. This lets the doctor adjust how quickly food passes from the new pouch into the stomach. It does not change the connection between the stomach and the intestines. What can you expect after the surgery? You may stay in the hospital for one or more days after the surgery.  How long you stay depends on the type of surgery you had. Most people need 2 to 4 weeks before they are ready to get back to their usual routine. For the first 2 to 6 weeks after surgery, you probably will need to follow a liquid or soft diet. Bit by bit, you will be able to eat more solid foods. Your doctor may advise you to work with a dietitian. This way you'll be sure to get enough protein, vitamins, and minerals while you are losing weight. Even with a healthy diet, you may need to take vitamin and mineral supplements. After surgery, you will not be able to eat very much at one time. You will get full quickly. Try not to eat too much at one time or eat foods that are high in fat or sugar. If you do, you may vomit, get stomach pain, or have diarrhea. You probably will lose weight very quickly in the first few months after surgery. As time goes on, your weight loss will slow down. You will have regular doctor visits to check how you are doing. Think of bariatric surgery as a tool to help you lose weight. It isn't an instant fix. You will still need to eat a healthy diet and get regular exercise. This will help you reach your weight goal and avoid regaining the weight you lose. Follow-up care is a key part of your treatment and safety. Be sure to make and go to all appointments, and call your doctor if you are having problems. It's also a good idea to know your test results and keep a list of the medicines you take. Where can you learn more? Go to http://yony-uyen.info/. Enter G469 in the search box to learn more about \"Learning About Bariatric Surgery. \" Current as of: October 13, 2016 Content Version: 11.4 © 3166-0386 [email or web address]. Care instructions adapted under license by [email or web address] (which disclaims liability or warranty for this information).  If you have questions about a medical condition or this instruction, always ask your healthcare professional. [place] [address] any warranty or liability for your use of this information. Learning About How to Prepare for [address] [facility] [address] Surgery How can you prepare for weight-loss surgery? Having weight-loss surgery (also called bariatric surgery) is a big step. You can prepare for surgery by having a plan. Your plan may include your goals for losing weight and how to makes changes in your diet, activity, and lifestyle to help raise your chances of success. One way to prepare for surgery is to think about your goal or reason why you want to reach a healthy weight. Do you want to lower your blood pressure, cholesterol, or blood sugar? Do you want to be able to sleep better, play with your kids, or walk around the block? Having a reason can help you stay with your plan and meet your goals. Your weight-loss surgery team can help you meet your goals and get ready for surgery. Mendy Krishna work with a team that's trained to help you lose weight and make healthy changes in your life. This team may include: · A medical doctor or nurse to help manage your care and schedule tests before surgery. · A surgeon who specializes in weight-loss surgery. · A registered dietitian to help you plan meals and make changes in the way you eat. · An exercise specialist to help you be more active and get stronger. · A therapist or counselor to help you learn why you eat and teach you ways to deal with stress and your emotions. Your team will also be there to help you prepare for life after surgery. They will help you adjust to new ways of eating and changes to your body. How will weight-loss surgery affect your life? You have likely thought a lot about how surgery may affect your life-how you will eat, how your body will look, or how you will feel. Some people feel overwhelmed with these changes.  But planning can help you prepare for the changes and meet your weight-loss goals. One important step in your plan is to learn about the ways surgery will affect your life. These may include: · A slimmer you. You probably will lose weight very quickly in the first few months after surgery. As time goes on, your weight loss will slow down. How much weight you lose depends on what type of surgery you had and how well your new eating and activity plans are working for you. · A new way of eating. Success in reaching and keeping a healthy weight depends on making lifelong changes in how you eat. After surgery, you raise your chances of success if you: 
¨ Eat just a few ounces of food at a time. ¨ Eat very slowly and chew your food to mush. ¨ Don't drink for 30 minutes before you eat, during your meal, and for 30 minutes after you eat. ¨ Are careful about drinking alcohol. ¨ Avoid foods that are high in fat or sugar. ¨ Take vitamin and mineral supplements. · A healthier you. Weight-loss surgery can have some real health benefits. Problems like diabetes, high blood pressure, and sleep apnea may go away-or at least become easier to manage. · A more active you. After surgery, being active on most days of the week will help you reach your weight goal and avoid gaining back the weight you lose. · A lot of extra skin. When you lose weight quickly, you may have a lot of extra skin. That's normal. You can have surgery to remove the extra skin if it bothers you. There are going to be some ups and downs while you get used to these changes. So another way to adjust is to identify who can help support you. Getting support from friends and family can help. And joining a support group for people who have had the surgery can be a big help too, because they know what you're going through. As you know, it's a big decision to have weight-loss surgery. But when you have a plan, you can focus on losing weight and living a healthier life. So what steps can you take to prepare for weight-loss surgery? Will you set some goals? Will you learn about how surgery can affect your life? How about asking family or friends for help? Write out your plan. Then get ready. Where can you learn more? Go to http://yony-uyen.info/. Enter R254 in the search box to learn more about \"Learning About How to Prepare for Weight-Loss Surgery. \" Current as of: October 13, 2016 Content Version: 11.4 © 8229-1847 Maimai. Care instructions adapted under license by [employer] (which disclaims liability or warranty for this information). If you have questions about a medical condition or this instruction, always ask your healthcare professional. [address] any warranty or liability for your use of this information. Body Mass Index: Care Instructions Your Care Instructions Body mass index (BMI) can help you see if your weight is raising your risk for health problems. It uses a formula to compare how much you weigh with how tall you are. · A BMI lower than 18.5 is considered underweight. · A BMI between 18.5 and 24.9 is considered healthy. · A BMI between 25 and 29.9 is considered overweight. A BMI of 30 or higher is considered obese. If your BMI is in the normal range, it means that you have a lower risk for weight-related health problems. If your BMI is in the overweight or obese range, you may be at increased risk for weight-related health problems, such as high blood pressure, heart disease, stroke, arthritis or joint pain, and diabetes. If your BMI is in the underweight range, you may be at increased risk for health problems such as fatigue, lower protection (immunity) against illness, muscle loss, bone loss, hair loss, and hormone problems. BMI is just one measure of your risk for weight-related health problems.  You may be at higher risk for health problems if you are not active, you eat an unhealthy diet, or you drink too much alcohol or use tobacco products. Follow-up care is a key part of your treatment and safety. Be sure to make and go to all appointments, and call your doctor if you are having problems. It's also a good idea to know your test results and keep a list of the medicines you take. How can you care for yourself at home? · Practice healthy eating habits. This includes eating plenty of fruits, vegetables, whole grains, lean protein, and low-fat dairy. · If your doctor recommends it, get more exercise. Walking is a good choice. Bit by bit, increase the amount you walk every day. Try for at least 30 minutes on most days of the week. · Do not smoke. Smoking can increase your risk for health problems. If you need help quitting, talk to your doctor about stop-smoking programs and medicines. These can increase your chances of quitting for good. · Limit alcohol to 2 drinks a day for men and 1 drink a day for women. Too much alcohol can cause health problems. If you have a BMI higher than 25 · Your doctor may do other tests to check your risk for weight-related health problems. This may include measuring the distance around your waist. A waist measurement of more than 40 inches in men or 35 inches in women can increase the risk of weight-related health problems. · Talk with your doctor about steps you can take to stay healthy or improve your health. You may need to make lifestyle changes to lose weight and stay healthy, such as changing your diet and getting regular exercise. If you have a BMI lower than 18.5 · Your doctor may do other tests to check your risk for health problems. · Talk with your doctor about steps you can take to stay healthy or improve your health. You may need to make lifestyle changes to gain or maintain weight and stay healthy, such as getting more healthy foods in your diet and doing exercises to build muscle. Where can you learn more? Go to http://yony-uyen.info/. Enter S176 in the search box to learn more about \"Body Mass Index: Care Instructions. \" Current as of: October 13, 2016 Content Version: 11.4 © 1419-2152 [employer]. Care instructions adapted under license by [employer] (which disclaims liability or warranty for this information). If you have questions about a medical condition or this instruction, always ask your healthcare professional. [address] any warranty or liability for your use of this information. Introducing [facility]! Liana Perrin introduces [employer] patient portal. Now you can access parts of your medical record, email your doctor's office, and request medication refills online. 1. In your internet browser, go to [email or web address] 2. Click on the First Time User? Click Here link in the Sign In box. You will see the New Member Sign Up page. 3. Enter your [employer] Access Code exactly as it appears below. You will not need to use this code after youve completed the sign-up process. If you do not sign up before the expiration date, you must request a new code. · [employer] Access Code: [email or web address] Expires: 5/8/2018 10:00 AM 
 
4. Enter the last four digits of your Social Security Number (xxxx) and Date of Birth (mm/dd/yyyy) as indicated and click Submit. You will be taken to the next sign-up page. 5. Create a [employer] ID. This will be your [employer] login ID and cannot be changed, so think of one that is secure and easy to remember. 6. Create a [employer] password. You can change your password at any time. 7. Enter your Password Reset Question and Answer. This can be used at a later time if you forget your password. 8. Enter your e-mail address. You will receive e-mail notification when new information is available in [address] 9. Click Sign Up.  You can now view and download portions of your medical record. 10. Click the Download Summary menu link to download a portable copy of your medical information. If you have questions, please visit the Frequently Asked Questions section of the [employer] website. Remember, [employer] is NOT to be used for urgent needs. For medical emergencies, dial 911. Now available from your iPhone and Android! Please provide this summary of care documentation to your next provider. Your primary care clinician is listed as KEVEN Smith. If you have any questions after today's visit, please call [phone].

## 2018-02-07 NOTE — PROGRESS NOTES
Harish Allison is a 63 y.o. female and presents with Follow Up Chronic Condition; Blood sugar problem; Hypertension; Obesity; and Results (labs)       Subjective:    htn- doing well. On meds today. Prediabetes- gained wt. Trying to watch diet- changed portions/eating less. Obesity- trying to cut calories. Acrochordon at base of left neck very bothersome- gets stuck in clothes     Assessment/Plan:    Obesity - again encouraged caloric restriction, discussed meds/programs/surgery. Goal 1 lb /week wt loss. Will refer to bariatric surgery at this point - the pt is interested in exploring this option. htn- well controlled - on meds- no changes. Prediabetes- recheck A1c. Add metformin and recheck A1c in 6 months.    acro chordon- removed under aseptic technique today from left base of neck. Wound care discussed.      RTC in 6 mo. Orders Placed This Encounter    REFERRAL TO BARIATRIC SURGERY     Referral Priority:   Routine     Referral Type:   Consultation     Referral Reason:   Specialty Services Required     Number of Visits Requested:   1     Diagnoses and all orders for this visit:    1. Essential hypertension  -     REFERRAL TO BARIATRIC SURGERY    2. Mixed hyperlipidemia  -     REFERRAL TO BARIATRIC SURGERY    3. Prediabetes  -     REFERRAL TO BARIATRIC SURGERY    4. Obesity, Class II, BMI 35-39.9, with comorbidity  -     REFERRAL TO BARIATRIC SURGERY    5. Acrochordon            ROS:  Negative except as mentioned above  Cardiac- no chest pain or palpitations  Pulmonary- no sob or wheezes  GI- no n/v or diarrhea. SH:  Social History   Substance Use Topics    Smoking status: Never Smoker    Smokeless tobacco: Never Used    Alcohol use No         Medications/Allergies:  Current Outpatient Prescriptions on File Prior to Visit   Medication Sig Dispense Refill    metoprolol succinate (TOPROL-XL) 25 mg XL tablet TAKE 1 TABLET BY MOUTH DAILY.  90 Tab 1    metFORMIN ER (GLUCOPHAGE XR) 500 mg tablet Take 1 Tab by mouth daily (with dinner). 90 Tab 3    simvastatin (ZOCOR) 10 mg tablet Take 1 Tab by mouth nightly. 90 Tab 3    hydroCHLOROthiazide (HYDRODIURIL) 25 mg tablet Take 1 Tab by mouth daily. 90 Tab 3    amLODIPine (NORVASC) 5 mg tablet Take 1 Tab by mouth daily. 90 Tab 3    lisinopril (PRINIVIL, ZESTRIL) 40 mg tablet Take 1 Tab by mouth daily. 90 Tab 3    aspirin delayed-release 81 mg tablet Take 1 Tab by mouth daily. 1 Tab 0    multivitamin (ONE A DAY) tablet Take 1 Tab by mouth daily. No current facility-administered medications on file prior to visit. Allergies   Allergen Reactions    Amoxicillin Anaphylaxis       Objective:  Visit Vitals    /75    Pulse 73    Temp 99.2 °F (37.3 °C) (Oral)    Resp 16    Ht 5' (1.524 m)    Wt 217 lb 6.4 oz (98.6 kg)    BMI 42.46 kg/m2    Body mass index is 42.46 kg/(m^2). Constitutional: Well developed, nourished, no distress, alert   HENT: Exterior ears and tympanic membranes normal bilaterally. Supple neck. No thyromegaly or lymphadenopathy. Oropharynx clear and moist mucous membranes. Eyes: Conjunctiva normal. PERRL. CV: S1, S2.  RRR. No murmurs/rubs. No thrills palpated. No carotid bruits. Intact distal pulses. No edema. Pulm: No abnormalities on inspection. Clear to auscultation bilaterally. No wheezing/rhonchi. Normal effort. GI: Soft, nontender, nondistended. Normal active bowel sounds. No  masses on palpation. No hepatosplenomegaly. [facility]  OFFICE PROCEDURE PROGRESS NOTE    Acrochordon removal left neck base.      Chart reviewed for the following:   Alisa Brown MD, have reviewed the History, Physical and updated the Allergic reactions for Iesha PATEL [address] [place] [address] Drive performed immediately prior to start of procedure:   Alisa Brown MD, have performed the following reviews on Iesha Thacker prior to the start of the procedure:            * Patient was identified by name and date of birth   * Agreement on procedure being performed was verified  * Risks and Benefits explained to the patient  * Procedure site verified and marked as necessary  * Patient was positioned for comfort  * Consent was signed and verified     Time: 9:59 a      Date of procedure: 2/7/2018    Procedure performed by:  Vin Doty MD    Provider assisted by: Petrona Locke MA    Patient assisted by: self    How tolerated by patient: tolerated the procedure well with no complications    Post Procedural Pain Scale: 0 - No Hurt    Comments: none

## 2018-02-07 NOTE — PATIENT INSTRUCTIONS
Learning About Bariatric Surgery  What is bariatric surgery? Bariatric surgery is surgery to help you lose weight. This type of surgery is only used for people who are very overweight and have not been able to lose weight with diet and exercise. This surgery makes the stomach smaller. Some types of surgery also change the connection between your stomach and intestines. How is bariatric surgery done? Bariatric surgery may be either \"open\" or \"laparoscopic. \" Open surgery is done through a large cut (incision) in the belly. Laparoscopic surgery is done through several small cuts. The doctor puts a lighted tube, or scope, and other surgical tools through small cuts in your belly. The doctor is able to see your organs with the scope. There are different types of bariatric surgery. Gastric sleeve surgery  The surgery is usually done through several small incisions in the belly. The doctor removes more than half of your stomach. This leaves a thin sleeve, or tube, that is about the size of a banana. Because part of your stomach has been removed, this can't be reversed. Timo-en-Y gastric bypass surgery  Timo-en-Y (say \"doris-en-why\") surgery changes the connection between the stomach and the intestines. The doctor separates a section of your stomach from the rest of your stomach. This makes a small pouch. The new pouch will hold the food you eat. The doctor connects the stomach pouch to the middle part of the small intestine. Gastric banding surgery  The surgery is usually done through several small incisions in the belly. The doctor wraps a band around the upper part of the stomach. This creates a small pouch. The small size of the pouch means that you will get full after you eat just a small amount of food. The doctor can inflate or deflate the band to adjust the size. This lets the doctor adjust how quickly food passes from the new pouch into the stomach.  It does not change the connection between the stomach and the intestines. What can you expect after the surgery? You may stay in the hospital for one or more days after the surgery. How long you stay depends on the type of surgery you had. Most people need 2 to 4 weeks before they are ready to get back to their usual routine. For the first 2 to 6 weeks after surgery, you probably will need to follow a liquid or soft diet. Bit by bit, you will be able to eat more solid foods. Your doctor may advise you to work with a dietitian. This way you'll be sure to get enough protein, vitamins, and minerals while you are losing weight. Even with a healthy diet, you may need to take vitamin and mineral supplements. After surgery, you will not be able to eat very much at one time. You will get full quickly. Try not to eat too much at one time or eat foods that are high in fat or sugar. If you do, you may vomit, get stomach pain, or have diarrhea. You probably will lose weight very quickly in the first few months after surgery. As time goes on, your weight loss will slow down. You will have regular doctor visits to check how you are doing. Think of bariatric surgery as a tool to help you lose weight. It isn't an instant fix. You will still need to eat a healthy diet and get regular exercise. This will help you reach your weight goal and avoid regaining the weight you lose. Follow-up care is a key part of your treatment and safety. Be sure to make and go to all appointments, and call your doctor if you are having problems. It's also a good idea to know your test results and keep a list of the medicines you take. Where can you learn more? Go to http://yony-uyen.info/. Enter G469 in the search box to learn more about \"Learning About Bariatric Surgery. \"  Current as of: October 13, 2016  Content Version: 11.4  © 2940-8873 [employer].  Care instructions adapted under license by [employer] (which disclaims liability or warranty for this information). If you have questions about a medical condition or this instruction, always ask your healthcare professional. [address] any warranty or liability for your use of this information. Learning About How to Prepare for Weight-Loss Surgery  How can you prepare for weight-loss surgery? Having weight-loss surgery (also called bariatric surgery) is a big step. You can prepare for surgery by having a plan. Your plan may include your goals for losing weight and how to makes changes in your diet, activity, and lifestyle to help raise your chances of success. One way to prepare for surgery is to think about your goal or reason why you want to reach a healthy weight. Do you want to lower your blood pressure, cholesterol, or blood sugar? Do you want to be able to sleep better, play with your kids, or walk around the block? Having a reason can help you stay with your plan and meet your goals. Your weight-loss surgery team can help you meet your goals and get ready for surgery. Debbi Buff work with a team that's trained to help you lose weight and make healthy changes in your life. This team may include:  · A medical doctor or nurse to help manage your care and schedule tests before surgery. · A surgeon who specializes in weight-loss surgery. · A registered dietitian to help you plan meals and make changes in the way you eat. · An exercise specialist to help you be more active and get stronger. · A therapist or counselor to help you learn why you eat and teach you ways to deal with stress and your emotions. Your team will also be there to help you prepare for life after surgery. They will help you adjust to new ways of eating and changes to your body. How will weight-loss surgery affect your life? You have likely thought a lot about how surgery may affect your life-how you will eat, how your body will look, or how you will feel.  Some people feel overwhelmed with these changes. But planning can help you prepare for the changes and meet your weight-loss goals. One important step in your plan is to learn about the ways surgery will affect your life. These may include:  · A slimmer you. You probably will lose weight very quickly in the first few months after surgery. As time goes on, your weight loss will slow down. How much weight you lose depends on what type of surgery you had and how well your new eating and activity plans are working for you. · A new way of eating. Success in reaching and keeping a healthy weight depends on making lifelong changes in how you eat. After surgery, you raise your chances of success if you:  ¨ Eat just a few ounces of food at a time. ¨ Eat very slowly and chew your food to mush. ¨ Don't drink for 30 minutes before you eat, during your meal, and for 30 minutes after you eat. ¨ Are careful about drinking alcohol. ¨ Avoid foods that are high in fat or sugar. ¨ Take vitamin and mineral supplements. · A healthier you. Weight-loss surgery can have some real health benefits. Problems like diabetes, high blood pressure, and sleep apnea may go away-or at least become easier to manage. · A more active you. After surgery, being active on most days of the week will help you reach your weight goal and avoid gaining back the weight you lose. · A lot of extra skin. When you lose weight quickly, you may have a lot of extra skin. That's normal. You can have surgery to remove the extra skin if it bothers you. There are going to be some ups and downs while you get used to these changes. So another way to adjust is to identify who can help support you. Getting support from friends and family can help. And joining a support group for people who have had the surgery can be a big help too, because they know what you're going through. As you know, it's a big decision to have weight-loss surgery.  But when you have a plan, you can focus on losing weight and living a healthier life. So what steps can you take to prepare for weight-loss surgery? Will you set some goals? Will you learn about how surgery can affect your life? How about asking family or friends for help? Write out your plan. Then get ready. Where can you learn more? Go to http://yony-uyen.info/. Enter L110 in the search box to learn more about \"Learning About How to Prepare for Weight-Loss Surgery. \"  Current as of: October 13, 2016  Content Version: 11.4  © 6809-4597 [employer]. Care instructions adapted under license by [employer] (which disclaims liability or warranty for this information). If you have questions about a medical condition or this instruction, always ask your healthcare professional. [address] any warranty or liability for your use of this information. Body Mass Index: Care Instructions  Your Care Instructions    Body mass index (BMI) can help you see if your weight is raising your risk for health problems. It uses a formula to compare how much you weigh with how tall you are. · A BMI lower than 18.5 is considered underweight. · A BMI between 18.5 and 24.9 is considered healthy. · A BMI between 25 and 29.9 is considered overweight. A BMI of 30 or higher is considered obese. If your BMI is in the normal range, it means that you have a lower risk for weight-related health problems. If your BMI is in the overweight or obese range, you may be at increased risk for weight-related health problems, such as high blood pressure, heart disease, stroke, arthritis or joint pain, and diabetes. If your BMI is in the underweight range, you may be at increased risk for health problems such as fatigue, lower protection (immunity) against illness, muscle loss, bone loss, hair loss, and hormone problems. BMI is just one measure of your risk for weight-related health problems.  You may be at higher risk for health problems if you are not active, you eat an unhealthy diet, or you drink too much alcohol or use tobacco products. Follow-up care is a key part of your treatment and safety. Be sure to make and go to all appointments, and call your doctor if you are having problems. It's also a good idea to know your test results and keep a list of the medicines you take. How can you care for yourself at home? · Practice healthy eating habits. This includes eating plenty of fruits, vegetables, whole grains, lean protein, and low-fat dairy. · If your doctor recommends it, get more exercise. Walking is a good choice. Bit by bit, increase the amount you walk every day. Try for at least 30 minutes on most days of the week. · Do not smoke. Smoking can increase your risk for health problems. If you need help quitting, talk to your doctor about stop-smoking programs and medicines. These can increase your chances of quitting for good. · Limit alcohol to 2 drinks a day for men and 1 drink a day for women. Too much alcohol can cause health problems. If you have a BMI higher than 25  · Your doctor may do other tests to check your risk for weight-related health problems. This may include measuring the distance around your waist. A waist measurement of more than 40 inches in men or 35 inches in women can increase the risk of weight-related health problems. · Talk with your doctor about steps you can take to stay healthy or improve your health. You may need to make lifestyle changes to lose weight and stay healthy, such as changing your diet and getting regular exercise. If you have a BMI lower than 18.5  · Your doctor may do other tests to check your risk for health problems. · Talk with your doctor about steps you can take to stay healthy or improve your health. You may need to make lifestyle changes to gain or maintain weight and stay healthy, such as getting more healthy foods in your diet and doing exercises to build muscle.   Where can you learn more?  Go to http://yony-uyen.info/. Enter S176 in the search box to learn more about \"Body Mass Index: Care Instructions. \"  Current as of: October 13, 2016  Content Version: 11.4  © 6699-3349 [employer]. Care instructions adapted under license by [employer] (which disclaims liability or warranty for this information). If you have questions about a medical condition or this instruction, always ask your healthcare professional. [address] any warranty or liability for your use of this information.

## 2018-05-30 RX ORDER — METOPROLOL SUCCINATE 25 MG/1
TABLET, EXTENDED RELEASE ORAL
Qty: 30 TAB | Refills: 0 | Status: SHIPPED | OUTPATIENT
Start: 2018-05-30 | End: 2018-08-27 | Stop reason: SDUPTHER

## 2018-07-24 DIAGNOSIS — I10 ESSENTIAL HYPERTENSION: ICD-10-CM

## 2018-07-25 LAB
AVG GLU, 10930: 129 MG/DL (ref 91–123)
HBA1C MFR BLD HPLC: 6.1 % (ref 4.8–5.9)

## 2018-08-07 ENCOUNTER — OFFICE VISIT (OUTPATIENT)
Dept: FAMILY MEDICINE CLINIC | Age: 63
End: 2018-08-07

## 2018-08-07 VITALS
DIASTOLIC BLOOD PRESSURE: 84 MMHG | HEART RATE: 64 BPM | HEIGHT: 64 IN | BODY MASS INDEX: 35.75 KG/M2 | TEMPERATURE: 97.5 F | RESPIRATION RATE: 16 BRPM | WEIGHT: 209.4 LBS | SYSTOLIC BLOOD PRESSURE: 123 MMHG

## 2018-08-07 DIAGNOSIS — I10 ESSENTIAL HYPERTENSION: ICD-10-CM

## 2018-08-07 DIAGNOSIS — Z12.11 SCREENING FOR COLON CANCER: ICD-10-CM

## 2018-08-07 DIAGNOSIS — E66.01 SEVERE OBESITY (BMI 35.0-39.9): ICD-10-CM

## 2018-08-07 DIAGNOSIS — R73.03 PREDIABETES: ICD-10-CM

## 2018-08-07 DIAGNOSIS — E78.2 MIXED HYPERLIPIDEMIA: ICD-10-CM

## 2018-08-07 DIAGNOSIS — R73.03 PREDIABETES: Primary | ICD-10-CM

## 2018-08-07 DIAGNOSIS — Z23 ENCOUNTER FOR IMMUNIZATION: ICD-10-CM

## 2018-08-07 NOTE — PROGRESS NOTES
1. Have you been to the ER, urgent care clinic since your last visit? Hospitalized since your last visit? No.     2. Have you seen or consulted any other health care providers outside of the [address] [place] [address] since your last visit? Include any pap smears or colon screening.  No.     Chief Complaint   Patient presents with    Follow Up Chronic Condition    Hypertension    Blood sugar problem    Obesity    Other     Acrochordon    Results     labs

## 2018-08-07 NOTE — MR AVS SNAPSHOT
[place] [facility] 8[address] [facility] [place]. [address] [facility] [id number] 
[phone] Patient: Harsha Valencia MRN: [id number] :1955 Visit Information Date & Time Provider Department Dept. Phone Encounter #  
 2018  8:30 AM Tiffany Johnston, [address] [place], [place] [phone] [id number] Follow-up Instructions Return in about 6 months (around 2019) for 30 minute slot and fasting labs prior. [facility] Upcoming Health Maintenance Date Due Pneumococcal 19-64 Medium Risk (1 of 1 - PPSV23) 1976 EYE EXAM RETINAL OR DILATED Q1 2015 FOOT EXAM Q1 2016 ZOSTER VACCINE AGE 60> 2017 MICROALBUMIN Q1 2018 Influenza Age 5 to Adult 10/1/2018* COLONOSCOPY 2018* PAP AKA CERVICAL CYTOLOGY 2019* HEMOGLOBIN A1C Q6M 2019 LIPID PANEL Q1 2019 BREAST CANCER SCRN MAMMOGRAM 2019 DTaP/Tdap/Td series (2 - Td) 2024 *Topic was postponed. The date shown is not the original due date. Allergies as of 2018  Review Complete On: 2018 By: Tiffany Johnston MD  
  
 Severity Noted Reaction Type Reactions Amoxicillin High 2014   Intolerance Anaphylaxis Current Immunizations  Reviewed on 2018 Name Date Influenza Vaccine 2012 Influenza Vaccine Rieselalesia Velascoch) 2016, 10/5/2015 Influenza Vaccine (Quad) PF 2017 Pneumococcal Polysaccharide (PPSV-23)  Incomplete Tdap 2014  5:00 PM  
  
 Reviewed by Tiffany Johnston MD on 2018 at  9:13 AM  
You Were Diagnosed With   
  
 Codes Comments Prediabetes    -  Primary ICD-10-CM: R73.03 
ICD-9-CM: 790.29 Encounter for immunization     ICD-10-CM: J16 ICD-9-CM: V03.89 Mixed hyperlipidemia     ICD-10-CM: E78.2 ICD-9-CM: 272.2 Essential hypertension     ICD-10-CM: I10 
ICD-9-CM: 401.9  Severe obesity (BMI 35.0-39.9) (HCC)     ICD-10-CM: E66.01 
ICD-9-CM: 278.01   
 Screening for colon cancer     ICD-10-CM: Z12.11 ICD-9-CM: V76.51 Vitals Resp Height(growth percentile) Weight(growth percentile) BMI OB Status Smoking Status 16 5' 4\" (1.626 m) 209 lb 6.4 oz (95 kg) 35.94 kg/m2 Hysterectomy Never Smoker Vitals History BMI and BSA Data Body Mass Index Body Surface Area 35.94 kg/m 2 2.07 m 2 Preferred Pharmacy Pharmacy Name Phone [facility], [address] [place] [address] [address] Your Updated Medication List  
  
   
This list is accurate as of 8/7/18  9:30 AM.  Always use your most recent med list. amLODIPine 5 mg tablet Commonly known as:  [place] TAKE 1 TABLET BY MOUTH DAILY. aspirin delayed-release 81 mg tablet Take 1 Tab by mouth daily. hydroCHLOROthiazide 25 mg tablet Commonly known as:  HYDRODIURIL  
TAKE 1 TABLET BY MOUTH DAILY. lisinopril 40 mg tablet Commonly known as:  PRINIVIL, ZESTRIL  
TAKE 1 TABLET BY MOUTH DAILY. metFORMIN  mg tablet Commonly known as:  GLUCOPHAGE XR Take 1 Tab by mouth daily (with dinner). metoprolol succinate 25 mg XL tablet Commonly known as:  TOPROL-XL  
TAKE 1 TABLET EVERY DAY  
  
 multivitamin tablet Commonly known as:  ONE A DAY Take 1 Tab by mouth daily. simvastatin 10 mg tablet Commonly known as:  ZOCOR  
TAKE 1 TABLET BY MOUTH NIGHTLY. * varicella-zoster recombinant (PF) 50 mcg/0.5 mL Susr injection Commonly known as:  SHINGRIX (PF)  
0.5 mL by IntraMUSCular route once for 1 dose. To be done 2-6 months after initial vaccination. * varicella-zoster recombinant (PF) 50 mcg/0.5 mL Susr injection Commonly known as:  SHINGRIX (PF)  
0.5 mL by IntraMUSCular route once for 1 dose. * Notice: This list has 2 medication(s) that are the same as other medications prescribed for you.  Read the directions carefully, and ask your doctor or other care provider to review them with you. Prescriptions Printed Refills  
 varicella-zoster recombinant, PF, (SHINGRIX, PF,) 50 mcg/0.5 mL susr injection 0 Si.5 mL by IntraMUSCular route once for 1 dose. To be done 2-6 months after initial vaccination. Class: Print Route: IntraMUSCular  
 varicella-zoster recombinant, PF, (SHINGRIX, PF,) 50 mcg/0.5 mL susr injection 0 Si.5 mL by IntraMUSCular route once for 1 dose. Class: Print Route: IntraMUSCular We Performed the Following PNEUMOCOCCAL POLYSACCHARIDE VACCINE, 23-VALENT, ADULT OR IMMUNOSUPPRESSED PT DOSE, [11030 CPT(R)] Follow-up Instructions Return in about 6 months (around 2019) for 30 minute slot and fasting labs prior. [facility] To-Do List   
 2018 Lab:  COLOGUARD TEST (FECAL DNA COLORECTAL CANCER SCREENING)   
  
 2018 Lab:  HEMOGLOBIN A1C WITH EAG   
  
 2018 Lab:  LIPID PANEL   
  
 2018 Lab:  METABOLIC PANEL, COMPREHENSIVE Patient Instructions Body Mass Index: Care Instructions Your Care Instructions Body mass index (BMI) can help you see if your weight is raising your risk for health problems. It uses a formula to compare how much you weigh with how tall you are. · A BMI lower than 18.5 is considered underweight. · A BMI between 18.5 and 24.9 is considered healthy. · A BMI between 25 and 29.9 is considered overweight. A BMI of 30 or higher is considered obese. If your BMI is in the normal range, it means that you have a lower risk for weight-related health problems. If your BMI is in the overweight or obese range, you may be at increased risk for weight-related health problems, such as high blood pressure, heart disease, stroke, arthritis or joint pain, and diabetes.  If your BMI is in the underweight range, you may be at increased risk for health problems such as fatigue, lower protection (immunity) against illness, muscle loss, bone loss, hair loss, and hormone problems. BMI is just one measure of your risk for weight-related health problems. You may be at higher risk for health problems if you are not active, you eat an unhealthy diet, or you drink too much alcohol or use tobacco products. Follow-up care is a key part of your treatment and safety. Be sure to make and go to all appointments, and call your doctor if you are having problems. It's also a good idea to know your test results and keep a list of the medicines you take. How can you care for yourself at home? · Practice healthy eating habits. This includes eating plenty of fruits, vegetables, whole grains, lean protein, and low-fat dairy. · If your doctor recommends it, get more exercise. Walking is a good choice. Bit by bit, increase the amount you walk every day. Try for at least 30 minutes on most days of the week. · Do not smoke. Smoking can increase your risk for health problems. If you need help quitting, talk to your doctor about stop-smoking programs and medicines. These can increase your chances of quitting for good. · Limit alcohol to 2 drinks a day for men and 1 drink a day for women. Too much alcohol can cause health problems. If you have a BMI higher than 25 · Your doctor may do other tests to check your risk for weight-related health problems. This may include measuring the distance around your waist. A waist measurement of more than 40 inches in men or 35 inches in women can increase the risk of weight-related health problems. · Talk with your doctor about steps you can take to stay healthy or improve your health. You may need to make lifestyle changes to lose weight and stay healthy, such as changing your diet and getting regular exercise. If you have a BMI lower than 18.5 · Your doctor may do other tests to check your risk for health problems.  
· Talk with your doctor about steps you can take to stay healthy or improve your health. You may need to make lifestyle changes to gain or maintain weight and stay healthy, such as getting more healthy foods in your diet and doing exercises to build muscle. Where can you learn more? Go to http://yony-uyen.info/. Enter S176 in the search box to learn more about \"Body Mass Index: Care Instructions. \" Current as of: October 13, 2016 Content Version: 11.4 © 5059-4826 [employer]. Care instructions adapted under license by [employer] (which disclaims liability or warranty for this information). If you have questions about a medical condition or this instruction, always ask your healthcare professional. [address] any warranty or liability for your use of this information. Vaccine Information Statement Pneumococcal Polysaccharide Vaccine: What You Need to Know Many Vaccine Information Statements are available in [place] and other languages. See www.immunize.org/vis. Hojas de información Sobre Vacunas están disponibles en español y en muchos otros idiomas. Visite [employer] MoinesScale.si. 1. Why get vaccinated? Vaccination can protect older adults (and some children and younger adults) from pneumococcal disease. Pneumococcal disease is caused by bacteria that can spread from person to person through close contact. It can cause ear infections, and it can also lead to more serious infections of the: 
 Lungs (pneumonia),  Blood (bacteremia), and 
 Covering of the brain and spinal cord (meningitis). Meningitis can cause deafness and brain damage, and it can be fatal.   
 
Anyone can get pneumococcal disease, but children under 3years of age, people with certain medical conditions, adults over 72years of age, and cigarette smokers are at the highest risk. About 18,000 older adults die each year from pneumococcal disease in the [place]. Treatment of pneumococcal infections with penicillin and other drugs used to be more effective. But some strains of the disease have become resistant to these drugs. This makes prevention of the disease, through vaccination, even more important. 2. Pneumococcal polysaccharide vaccine (PPSV23) Pneumococcal polysaccharide vaccine (PPSV23) protects against 23 types of pneumococcal bacteria. It will not prevent all pneumococcal disease. PPSV23 is recommended for:  All adults 72years of age and older,  Anyone 2 through 59years of age with certain long-term health problems, 
 Anyone 2 through 59years of age with a weakened immune system, 
 Adults 23 through 59years of age who smoke cigarettes or have asthma. Most people need only one dose of PPSV. A second dose is recommended for certain high-risk groups. People 72 and older should get a dose even if they have gotten one or more doses of the vaccine before they turned 65. Your healthcare provider can give you more information about these recommendations. Most healthy adults develop protection within 2 to 3 weeks of getting the shot. 3. Some people should not get this vaccine  Anyone who has had a life-threatening allergic reaction to PPSV should not get another dose.  Anyone who has a severe allergy to any component of PPSV should not receive it. Tell your provider if you have any severe allergies.  Anyone who is moderately or severely ill when the shot is scheduled may be asked to wait until they recover before getting the vaccine. Someone with a mild illness can usually be vaccinated.  Children less than 3years of age should not receive this vaccine.  There is no evidence that PPSV is harmful to either a pregnant woman or to her fetus. However, as a precaution, women who need the vaccine should be vaccinated before becoming pregnant, if possible. 4. Risks of a vaccine reaction With any medicine, including vaccines, there is a chance of side effects. These are usually mild and go away on their own, but serious reactions are also possible. About half of people who get PPSV have mild side effects, such as redness or pain where the shot is given, which go away within about two days. Less than 1 out of 100 people develop a fever, muscle aches, or more severe local reactions. Problems that could happen after any vaccine:  People sometimes faint after a medical procedure, including vaccination. Sitting or lying down for about 15 minutes can help prevent fainting, and injuries caused by a fall. Tell your doctor if you feel dizzy, or have vision changes or ringing in the ears.  Some people get severe pain in the shoulder and have difficulty moving the arm where a shot was given. This happens very rarely.  Any medication can cause a severe allergic reaction. Such reactions from a vaccine are very rare, estimated at about 1 in a million doses, and would happen within a few minutes to a few hours after the vaccination. As with any medicine, there is a very remote chance of a vaccine causing a serious injury or death. The safety of vaccines is always being monitored. For more information, visit: www.cdc.gov/vaccinesafety/  
 
5. What if there is a serious reaction? What should I look for? Look for anything that concerns you, such as signs of a severe allergic reaction, very high fever, or unusual behavior. Signs of a severe allergic reaction can include hives, swelling of the face and throat, difficulty breathing, a fast heartbeat, dizziness, and weakness. These would usually start a few minutes to a few hours after the vaccination. What should I do? If you think it is a severe allergic reaction or other emergency that cant wait, call 9-1-1 or get to the nearest hospital. Otherwise, call your doctor. Afterward, the reaction should be reported to the Vaccine Adverse Event Reporting System (VAERS). Your doctor might file this report, or you can do it yourself through the VAERS web site at www.vaers. [facility].gov, or by calling 0-985.266.5324. VAERS does not give medical advice. 6. How can I learn more?  Ask your doctor. He or she can give you the vaccine package insert or suggest other sources of information.  Call your local or state health department.  Contact the Centers for Disease Control and Prevention (CDC): 
- Call 3-117.830.1408 (1-800-CDC-INFO) or 
- Visit CDCs website at www.cdc.gov/vaccines Vaccine Information Statement PPSV  
04/24/2015 [facility] and [employer] Centers for Disease Control and Prevention Office Use Only Introducing \[facility]\"" HEALTH SERVICES! Johanny Diaz introduces [employer] [facility] [employer] patient portal. Now you can access parts of your medical record, email your doctor's office, and request medication refills online. 1. In your internet browser, go to [email or web address] 2. Click on the First Time User? Click Here link in the Sign In box. You will see the New Member Sign Up page. 3. Enter your [facility] Access Code exactly as it appears below. You will not need to use this code after youve completed the sign-up process. If you do not sign up before the expiration date, you must request a new code. · [facility] Access Code: [email or web address] Expires: 11/5/2018  9:29 AM 
 
4. Enter the last four digits of your Social Security Number (xxxx) and Date of Birth (mm/dd/yyyy) as indicated and click Submit. You will be taken to the next sign-up page. 5. Create a Tyto Lifet ID. This will be your [facility] login ID and cannot be changed, so think of one that is secure and easy to remember. 6. Create a [facility] password. You can change your password at any time. 7. Enter your Password Reset Question and Answer.  This can be used at a later time if you forget your password. 8. Enter your e-mail address. You will receive e-mail notification when new information is available in [address] 9. Click Sign Up. You can now view and download portions of your medical record. 10. Click the Download Summary menu link to download a portable copy of your medical information. If you have questions, please visit the Frequently Asked Questions section of the [employer] website. Remember, [employer] is NOT to be used for urgent needs. For medical emergencies, dial 911. Now available from your iPhone and Android! Please provide this summary of care documentation to your next provider. Your primary care clinician is listed as KEVEN Smith. If you have any questions after today's visit, please call [phone].

## 2018-08-07 NOTE — PROGRESS NOTES
Warren Will is a 63 y.o. female and presents with Follow Up Chronic Condition; Hypertension; Blood sugar problem; Obesity; Other (Acrochordon); Results (labs); and Abdominal Pain (comes and goes since 2017. )       Subjective:    htn- doing well. On meds today. Prediabetes- gained wt. Trying to watch diet- changed portions/eating less. Obesity- trying to cut calories. Walking and got a fitbit. Didn't go see bariatric  Hyperlipidemia- on statin. No myalgia or abd pain.     lower abd pain- started last sept- went to [facility]- had xray and labs- was told it was muscular- it went away but has been returning every few months for about one week- bilateral lower abd pain, soreness, hurts to walk, no n/v, no f/c. No diarrhea or stool changes. Assessment/Plan:    Obesity - again encouraged caloric restriction, discussed meds/programs/surgery. Goal 1 lb /week wt loss. Revisit bariatric surgery if needed.   htn- well controlled - on meds- no changes. Prediabetes- recheck A1c unchanged 6.1%. Continue metformin and recheck A1c in 6 months. Hopefully will improve with continued wt loss. HM-   Hyperlipidemia- on statin- recheck FLP next visit.       RTC in 6 mo. Orders Placed This Encounter    Pneumococcal polysaccharide vaccine, 23-valent, adult or immunosuppressed pt dose    METABOLIC PANEL, COMPREHENSIVE     Standing Status:   Future     Standing Expiration Date:   2019    LIPID PANEL     Standing Status:   Future     Standing Expiration Date:   2019    HEMOGLOBIN A1C WITH EAG     Standing Status:   Future     Standing Expiration Date:   2019    COLOGUARD TEST (FECAL DNA COLORECTAL CANCER SCREENING)     Standing Status:   Future     Standing Expiration Date:   2019    varicella-zoster recombinant, PF, (SHINGRIX, PF,) 50 mcg/0.5 mL susr injection     Si.5 mL by IntraMUSCular route once for 1 dose. To be done 2-6 months after initial vaccination.      Dispense:  0.5 mL Refill:  0    varicella-zoster recombinant, PF, (SHINGRIX, PF,) 50 mcg/0.5 mL susr injection     Si.5 mL by IntraMUSCular route once for 1 dose. Dispense:  0.5 mL     Refill:  0         Diagnoses and all orders for this visit:    1. Prediabetes  -     HEMOGLOBIN A1C WITH EAG; Future    2. Encounter for immunization  -     Pneumococcal polysaccharide vaccine, 23-valent, adult or immunosuppressed pt dose    3. Mixed hyperlipidemia  -     METABOLIC PANEL, COMPREHENSIVE; Future  -     LIPID PANEL; Future    4. Essential hypertension    5. Severe obesity (BMI 35.0-39.9) ([facility] [address]    6. Screening for colon cancer  -     COLOGUARD TEST (FECAL DNA COLORECTAL CANCER SCREENING); Future    Other orders  -     varicella-zoster recombinant, PF, (SHINGRIX, PF,) 50 mcg/0.5 mL susr injection; 0.5 mL by IntraMUSCular route once for 1 dose. To be done 2-6 months after initial vaccination.  -     varicella-zoster recombinant, PF, (SHINGRIX, PF,) 50 mcg/0.5 mL susr injection; 0.5 mL by IntraMUSCular route once for 1 dose. ROS:  Negative except as mentioned above  Cardiac- no chest pain or palpitations  Pulmonary- no sob or wheezes  GI- no n/v or diarrhea. SH:  Social History   Substance Use Topics    Smoking status: Never Smoker    Smokeless tobacco: Never Used    Alcohol use No         Medications/Allergies:  Current Outpatient Prescriptions on File Prior to Visit   Medication Sig Dispense Refill    hydroCHLOROthiazide (HYDRODIURIL) 25 mg tablet TAKE 1 TABLET BY MOUTH DAILY. 30 Tab 5    simvastatin (ZOCOR) 10 mg tablet TAKE 1 TABLET BY MOUTH NIGHTLY. 90 Tab 3    metoprolol succinate (TOPROL-XL) 25 mg XL tablet TAKE 1 TABLET EVERY DAY 30 Tab 0    amLODIPine (NORVASC) 5 mg tablet TAKE 1 TABLET BY MOUTH DAILY. 30 Tab 5    lisinopril (PRINIVIL, ZESTRIL) 40 mg tablet TAKE 1 TABLET BY MOUTH DAILY. 30 Tab 5    metFORMIN ER (GLUCOPHAGE XR) 500 mg tablet Take 1 Tab by mouth daily (with dinner).  90 Tab 3    aspirin delayed-release 81 mg tablet Take 1 Tab by mouth daily. 1 Tab 0    multivitamin (ONE A DAY) tablet Take 1 Tab by mouth daily. No current facility-administered medications on file prior to visit. Allergies   Allergen Reactions    Amoxicillin Anaphylaxis       Objective:  Visit Vitals    Resp 16    Ht 5' 4\" (1.626 m)    Wt 209 lb 6.4 oz (95 kg)    BMI 35.94 kg/m2    Body mass index is 35.94 kg/(m^2). Constitutional: Well developed, nourished, no distress, alert   CV: S1, S2.  RRR. No murmurs/rubs. No thrills palpated. No carotid bruits. Intact distal pulses. No edema. Pulm: No abnormalities on inspection. Clear to auscultation bilaterally. No wheezing/rhonchi. Normal effort. GI: Soft, nontender, nondistended. Normal active bowel sounds. No  masses on palpation. No hepatosplenomegaly.

## 2018-08-07 NOTE — PATIENT INSTRUCTIONS
Body Mass Index: Care Instructions  Your Care Instructions    Body mass index (BMI) can help you see if your weight is raising your risk for health problems. It uses a formula to compare how much you weigh with how tall you are. · A BMI lower than 18.5 is considered underweight. · A BMI between 18.5 and 24.9 is considered healthy. · A BMI between 25 and 29.9 is considered overweight. A BMI of 30 or higher is considered obese. If your BMI is in the normal range, it means that you have a lower risk for weight-related health problems. If your BMI is in the overweight or obese range, you may be at increased risk for weight-related health problems, such as high blood pressure, heart disease, stroke, arthritis or joint pain, and diabetes. If your BMI is in the underweight range, you may be at increased risk for health problems such as fatigue, lower protection (immunity) against illness, muscle loss, bone loss, hair loss, and hormone problems. BMI is just one measure of your risk for weight-related health problems. You may be at higher risk for health problems if you are not active, you eat an unhealthy diet, or you drink too much alcohol or use tobacco products. Follow-up care is a key part of your treatment and safety. Be sure to make and go to all appointments, and call your doctor if you are having problems. It's also a good idea to know your test results and keep a list of the medicines you take. How can you care for yourself at home? · Practice healthy eating habits. This includes eating plenty of fruits, vegetables, whole grains, lean protein, and low-fat dairy. · If your doctor recommends it, get more exercise. Walking is a good choice. Bit by bit, increase the amount you walk every day. Try for at least 30 minutes on most days of the week. · Do not smoke. Smoking can increase your risk for health problems. If you need help quitting, talk to your doctor about stop-smoking programs and medicines. These can increase your chances of quitting for good. · Limit alcohol to 2 drinks a day for men and 1 drink a day for women. Too much alcohol can cause health problems. If you have a BMI higher than 25  · Your doctor may do other tests to check your risk for weight-related health problems. This may include measuring the distance around your waist. A waist measurement of more than 40 inches in men or 35 inches in women can increase the risk of weight-related health problems. · Talk with your doctor about steps you can take to stay healthy or improve your health. You may need to make lifestyle changes to lose weight and stay healthy, such as changing your diet and getting regular exercise. If you have a BMI lower than 18.5  · Your doctor may do other tests to check your risk for health problems. · Talk with your doctor about steps you can take to stay healthy or improve your health. You may need to make lifestyle changes to gain or maintain weight and stay healthy, such as getting more healthy foods in your diet and doing exercises to build muscle. Where can you learn more? Go to http://yony-uyen.info/. Enter S176 in the search box to learn more about \"Body Mass Index: Care Instructions. \"  Current as of: October 13, 2016  Content Version: 11.4  © 6940-0279 [employer]. Care instructions adapted under license by [employer] (which disclaims liability or warranty for this information). If you have questions about a medical condition or this instruction, always ask your healthcare professional. [place] [address] any warranty or liability for your use of this information. Vaccine Information Statement    Pneumococcal Polysaccharide Vaccine: What You Need to Know    Many Vaccine Information Statements are available in [place] and other languages. See www.immunize.org/vis.   Hojas de información Sobre Vacunas están disponibles en español y en muchos beba smith. Visitjulianna Monahan.si. 1. Why get vaccinated? Vaccination can protect older adults (and some children and younger adults) from pneumococcal disease. Pneumococcal disease is caused by bacteria that can spread from person to person through close contact. It can cause ear infections, and it can also lead to more serious infections of the:   Lungs (pneumonia),   Blood (bacteremia), and   Covering of the brain and spinal cord (meningitis). Meningitis can cause deafness and brain damage, and it can be fatal.      Anyone can get pneumococcal disease, but children under 3years of age, people with certain medical conditions, adults over 72years of age, and cigarette smokers are at the highest risk. About 18,000 older adults die each year from pneumococcal disease in the [place]. Treatment of pneumococcal infections with penicillin and other drugs used to be more effective. But some strains of the disease have become resistant to these drugs. This makes prevention of the disease, through vaccination, even more important. 2. Pneumococcal polysaccharide vaccine (PPSV23)    Pneumococcal polysaccharide vaccine (PPSV23) protects against 23 types of pneumococcal bacteria. It will not prevent all pneumococcal disease. PPSV23 is recommended for:   All adults 72years of age and older,   Anyone 2 through 59years of age with certain long-term health problems,   Anyone 2 through 59years of age with a weakened immune system,   Adults 23 through 59years of age who smoke cigarettes or have asthma. Most people need only one dose of PPSV. A second dose is recommended for certain high-risk groups. People 72 and older should get a dose even if they have gotten one or more doses of the vaccine before they turned 65. Your healthcare provider can give you more information about these recommendations.     Most healthy adults develop protection within 2 to 3 weeks of getting the shot. 3. Some people should not get this vaccine     Anyone who has had a life-threatening allergic reaction to PPSV should not get another dose.  Anyone who has a severe allergy to any component of PPSV should not receive it. Tell your provider if you have any severe allergies.  Anyone who is moderately or severely ill when the shot is scheduled may be asked to wait until they recover before getting the vaccine. Someone with a mild illness can usually be vaccinated.  Children less than 3years of age should not receive this vaccine.  There is no evidence that PPSV is harmful to either a pregnant woman or to her fetus. However, as a precaution, women who need the vaccine should be vaccinated before becoming pregnant, if possible. 4. Risks of a vaccine reaction    With any medicine, including vaccines, there is a chance of side effects. These are usually mild and go away on their own, but serious reactions are also possible. About half of people who get PPSV have mild side effects, such as redness or pain where the shot is given, which go away within about two days. Less than 1 out of 100 people develop a fever, muscle aches, or more severe local reactions. Problems that could happen after any vaccine:     People sometimes faint after a medical procedure, including vaccination. Sitting or lying down for about 15 minutes can help prevent fainting, and injuries caused by a fall. Tell your doctor if you feel dizzy, or have vision changes or ringing in the ears.  Some people get severe pain in the shoulder and have difficulty moving the arm where a shot was given. This happens very rarely.  Any medication can cause a severe allergic reaction. Such reactions from a vaccine are very rare, estimated at about 1 in a million doses, and would happen within a few minutes to a few hours after the vaccination.      As with any medicine, there is a very remote chance of a vaccine causing a serious injury or death. The safety of vaccines is always being monitored. For more information, visit: www.cdc.gov/vaccinesafety/     5. What if there is a serious reaction? What should I look for? Look for anything that concerns you, such as signs of a severe allergic reaction, very high fever, or unusual behavior. Signs of a severe allergic reaction can include hives, swelling of the face and throat, difficulty breathing, a fast heartbeat, dizziness, and weakness. These would usually start a few minutes to a few hours after the vaccination. What should I do? If you think it is a severe allergic reaction or other emergency that cant wait, call 9-1-1 or get to the nearest hospital. Otherwise, call your doctor. Afterward, the reaction should be reported to the Vaccine Adverse Event Reporting System (VAERS). Your doctor might file this report, or you can do it yourself through the VAERS web site at www.vaers. [facility].gov, or by calling 2-639.126.3211. [employer] does not give medical advice. 6. How can I learn more?  Ask your doctor. He or she can give you the vaccine package insert or suggest other sources of information.  Call your local or state health department.    Contact the Centers for Disease Control and Prevention (CDC):  - Call 0-249.582.3304 (1-800-CDC-INFO) or  - Visit CDCs website at [address] 04/24/2015    [facility] for Disease Control and Prevention    Office Use Only

## 2018-11-21 ENCOUNTER — OFFICE VISIT (OUTPATIENT)
Dept: FAMILY MEDICINE CLINIC | Age: 63
End: 2018-11-21

## 2018-11-21 VITALS
DIASTOLIC BLOOD PRESSURE: 88 MMHG | HEIGHT: 64 IN | BODY MASS INDEX: 35.34 KG/M2 | SYSTOLIC BLOOD PRESSURE: 149 MMHG | WEIGHT: 207 LBS | RESPIRATION RATE: 16 BRPM | TEMPERATURE: 97.8 F | HEART RATE: 63 BPM

## 2018-11-21 DIAGNOSIS — M79.10 MYALGIA: ICD-10-CM

## 2018-11-21 DIAGNOSIS — Z23 ENCOUNTER FOR IMMUNIZATION: Primary | ICD-10-CM

## 2018-11-21 DIAGNOSIS — R07.89 ATYPICAL CHEST PAIN: ICD-10-CM

## 2018-11-21 LAB
QUICKVUE INFLUENZA TEST: NEGATIVE
VALID INTERNAL CONTROL?: YES

## 2018-11-21 NOTE — PROGRESS NOTES
Kiah Santo is a 63 y.o. female and presents with Neck Pain (right side radiates down to her clavicle area for about a week. ) and Epigastric Pain       Subjective:    Pain started in right forehead and then traveled down to neck on right and left side and then in left upper arm. And then in burning in center chest and then also in lower and mid back bilaterally. Also some nausea. Sx for about one week. Some fever. No cough. Poor sleep. Tried some alleve without relief. Assessment/Plan:        Diagnoses and all orders for this visit:    1. Encounter for immunization  -     INFLUENZA VIRUS VAC QUAD,SPLIT,PRESV FREE SYRINGE IM    2. Atypical chest pain- ECG done today shows NSR and no acute ischemic changes. -     AMB POC EKG ROUTINE W/ 12 LEADS, INTER & REP    3. Myalgia- rapid flu test negative, still suspect viral infection. Vitals reassuring. Expect improvement in next 5-7 days. If not pt asked to return. -     AMB POC RAPID INFLUENZA TEST        RTC prn  Orders Placed This Encounter    Influenza virus vaccine (QUADRIVALENT PRES FREE SYRINGE) IM ([id number])    METABOLIC PANEL, BASIC     Standing Status:   Future     Standing Expiration Date:   11/22/2019    CBC WITH AUTOMATED DIFF     Standing Status:   Future     Standing Expiration Date:   11/22/2019    CK     Standing Status:   Future     Standing Expiration Date:   11/22/2019    AMB POC RAPID INFLUENZA TEST    AMB POC EKG ROUTINE W/ 12 LEADS, INTER & REP     Order Specific Question:   Reason for Exam:     Answer:   chest pain               ROS:  Negative except as mentioned above  Cardiac- no chest pain or palpitations  Pulmonary- no sob or wheezes  GI- no n/v or diarrhea.       SH:  Social History     Tobacco Use    Smoking status: Never Smoker    Smokeless tobacco: Never Used   Substance Use Topics    Alcohol use: No    Drug use: No         Medications/Allergies:  Current Outpatient Medications on File Prior to Visit   Medication Sig Dispense Refill    metFORMIN ER (GLUCOPHAGE XR) 500 mg tablet TAKE ONE TABLET BY MOUTH DAILY WITH DINNER 90 Tab 1    metoprolol succinate (TOPROL-XL) 25 mg XL tablet TAKE 1 TABLET EVERY DAY 30 Tab 5    hydroCHLOROthiazide (HYDRODIURIL) 25 mg tablet TAKE 1 TABLET BY MOUTH DAILY. 30 Tab 5    simvastatin (ZOCOR) 10 mg tablet TAKE 1 TABLET BY MOUTH NIGHTLY. 90 Tab 3    amLODIPine (NORVASC) 5 mg tablet TAKE 1 TABLET BY MOUTH DAILY. 30 Tab 5    lisinopril (PRINIVIL, ZESTRIL) 40 mg tablet TAKE 1 TABLET BY MOUTH DAILY. 30 Tab 5    aspirin delayed-release 81 mg tablet Take 1 Tab by mouth daily. 1 Tab 0    multivitamin (ONE A DAY) tablet Take 1 Tab by mouth daily. No current facility-administered medications on file prior to visit. Allergies   Allergen Reactions    Amoxicillin Anaphylaxis       Objective:  Visit Vitals  /88   Pulse 63   Temp 97.8 °F (36.6 °C) (Oral)   Resp 16   Ht 5' 4\" (1.626 m)   Wt 207 lb (93.9 kg)   BMI 35.53 kg/m²    Body mass index is 35.53 kg/m². Constitutional: Well developed, nourished, no distress, alert   HENT: Exterior ears and tympanic membranes normal bilaterally. Supple neck. No thyromegaly or lymphadenopathy. Oropharynx clear and moist mucous membranes. Eyes: Conjunctiva normal. PERRL. CV: S1, S2.  RRR. No murmurs/rubs. No thrills palpated. No carotid bruits. Intact distal pulses. No edema. Pulm: No abnormalities on inspection. Clear to auscultation bilaterally. No wheezing/rhonchi. Normal effort. GI: Soft, nontender, nondistended. Normal active bowel sounds. No  masses on palpation. No hepatosplenomegaly.

## 2018-11-21 NOTE — PROGRESS NOTES
1. Have you been to the ER, urgent care clinic since your last visit? Hospitalized since your last visit? No.     2. Have you seen or consulted any other health care providers outside of the [address] [place] [address] since your last visit? Include any pap smears or colon screening. No.    Chief Complaint   Patient presents with    Neck Pain     right side radiates down to her clavicle area for about a week.      Epigastric Pain

## 2018-11-21 NOTE — PROGRESS NOTES
Patient presents for influenza injection. After obtaining consent, area prepped and injection given in right deltoid. No signs nor symptoms of adverse reaction noted. Patient tolerated injection well.

## 2018-11-21 NOTE — PATIENT INSTRUCTIONS
Muscle Aches: Care Instructions  Your Care Instructions    Muscle aches have many possible causes. Some common ones are overuse, tension, and injuries such as a strained muscle. An infection such as the flu can cause muscle aches. Or the aches may be caused by some medicines, such as antipsychotics. Muscle aches may also be a symptom of a disease like lupus or fibromyalgia. Myalgia is the medical term for muscle aches. The doctor will do a physical exam and ask questions to try to find what is causing your pain. You may also have tests such as blood tests or imaging tests like X-rays. These can help find or rule out serious problems. The doctor has checked you carefully, but problems can develop later. If you notice any problems or new symptoms, get medical treatment right away. Follow-up care is a key part of your treatment and safety. Be sure to make and go to all appointments, and call your doctor if you are having problems. It's also a good idea to know your test results and keep a list of the medicines you take. How can you care for yourself at home? · Rest the area that hurts. You may need to stop or reduce the activity that causes your symptoms. Then you can return to it slowly. · Put ice or a cold pack on the area for 10 to 20 minutes at a time to ease pain. Put a thin cloth between the ice and your skin. · Take an over-the-counter pain medicine, such as acetaminophen (Tylenol), ibuprofen (Advil, Motrin), or naproxen (Aleve). Be safe with medicines. Read and follow all instructions on the label. When should you call for help? Call your doctor now or seek immediate medical care if:    · Your pain gets worse.     · You have new symptoms, such as a fever, swelling, or a rash.    Watch closely for changes in your health, and be sure to contact your doctor if:    · You do not get better as expected. Where can you learn more? Go to http://yony-uyen.info/.   Enter G355 in the search box to learn more about \"Muscle Aches: Care Instructions. \"  Current as of: June 4, 2018  Content Version: 11.8  © 2820-2018 [employer]. Care instructions adapted under license by [employer] (which disclaims liability or warranty for this information). If you have questions about a medical condition or this instruction, always ask your healthcare professional. [address] any warranty or liability for your use of this information. Viral Infections: Care Instructions  Your Care Instructions    You don't feel well, but it's not clear what's causing it. You may have a viral infection. Viruses cause many illnesses, such as the common cold, influenza, fever, rashes, and the diarrhea, nausea, and vomiting that are often called \"stomach flu. \" You may wonder if antibiotic medicines could make you feel better. But antibiotics only treat infections caused by bacteria. They don't work on viruses. The good news is that viral infections usually aren't serious. Most will go away in a few days without medical treatment. In the meantime, there are a few things you can do to make yourself more comfortable. Follow-up care is a key part of your treatment and safety. Be sure to make and go to all appointments, and call your doctor if you are having problems. It's also a good idea to know your test results and keep a list of the medicines you take. How can you care for yourself at home? · Get plenty of rest if you feel tired. · Take an over-the-counter pain medicine if needed, such as acetaminophen (Tylenol), ibuprofen (Advil, Motrin), or naproxen (Aleve). Read and follow all instructions on the label. · Be careful when taking over-the-counter cold or flu medicines and Tylenol at the same time. Many of these medicines have acetaminophen, which is Tylenol. Read the labels to make sure that you are not taking more than the recommended dose.  Too much acetaminophen (Tylenol) can be harmful. · Drink plenty of fluids, enough so that your urine is light yellow or clear like water. If you have kidney, heart, or liver disease and have to limit fluids, talk with your doctor before you increase the amount of fluids you drink. · Stay home from work, school, and other public places while you have a fever. When should you call for help? Call 911 anytime you think you may need emergency care. For example, call if:    · You have severe trouble breathing.     · You passed out (lost consciousness).    Call your doctor now or seek immediate medical care if:    · You seem to be getting much sicker.     · You have a new or higher fever.     · You have blood in your stools.     · You have new belly pain, or your pain gets worse.     · You have a new rash.    Watch closely for changes in your health, and be sure to contact your doctor if:    · You start to get better and then get worse.     · You do not get better as expected. Where can you learn more? Go to http://yony-uyen.info/. Enter S826 in the search box to learn more about \"Viral Infections: Care Instructions. \"  Current as of: November 18, 2017  Content Version: 11.8  © 6132-6592 [employer]. Care instructions adapted under license by [employer] (which disclaims liability or warranty for this information). If you have questions about a medical condition or this instruction, always ask your healthcare professional. [address] any warranty or liability for your use of this information.

## 2018-11-22 LAB
ABSOLUTE LYMPHOCYTE COUNT, 10803: 3.3 K/UL (ref 1–4.8)
ANION GAP SERPL CALC-SCNC: 15 MMOL/L
BASOPHILS # BLD: 0 K/UL (ref 0–0.2)
BASOPHILS NFR BLD: 0 % (ref 0–2)
BUN SERPL-MCNC: 14 MG/DL (ref 6–22)
CALCIUM SERPL-MCNC: 9.9 MG/DL (ref 8.4–10.5)
CHLORIDE SERPL-SCNC: 99 MMOL/L (ref 98–110)
CK SERPL-CCNC: 70 U/L (ref 30–165)
CO2 SERPL-SCNC: 25 MMOL/L (ref 20–32)
CREAT SERPL-MCNC: 0.6 MG/DL (ref 0.8–1.4)
EOSINOPHIL # BLD: 0.1 K/UL (ref 0–0.5)
EOSINOPHIL NFR BLD: 1 % (ref 0–6)
ERYTHROCYTE [DISTWIDTH] IN BLOOD BY AUTOMATED COUNT: 14.8 % (ref 10–15.5)
GFRAA, 66117: >60
GFRNA, 66118: >60
GLUCOSE SERPL-MCNC: 96 MG/DL (ref 70–99)
GRANULOCYTES,GRANS: 60 % (ref 40–75)
HCT VFR BLD AUTO: 38.4 % (ref 35.1–48)
HGB BLD-MCNC: 12.2 G/DL (ref 11.7–16)
LYMPHOCYTES, LYMLT: 33 % (ref 20–45)
MCH RBC QN AUTO: 26 PG (ref 26–34)
MCHC RBC AUTO-ENTMCNC: 32 G/DL (ref 31–36)
MCV RBC AUTO: 81 FL (ref 80–95)
MONOCYTES # BLD: 0.5 K/UL (ref 0.1–1)
MONOCYTES NFR BLD: 5 % (ref 3–12)
NEUTROPHILS # BLD AUTO: 5.9 K/UL (ref 1.8–7.7)
PLATELET # BLD AUTO: 309 K/UL (ref 140–440)
PMV BLD AUTO: 10.3 FL (ref 9–13)
POTASSIUM SERPL-SCNC: 3.7 MMOL/L (ref 3.5–5.5)
RBC # BLD AUTO: 4.75 M/UL (ref 3.8–5.2)
SODIUM SERPL-SCNC: 139 MMOL/L (ref 133–145)
WBC # BLD AUTO: 9.9 K/UL (ref 4–11)

## 2019-03-14 ENCOUNTER — OFFICE VISIT (OUTPATIENT)
Dept: FAMILY MEDICINE CLINIC | Age: 64
End: 2019-03-14

## 2019-03-14 VITALS
WEIGHT: 203 LBS | RESPIRATION RATE: 17 BRPM | SYSTOLIC BLOOD PRESSURE: 125 MMHG | TEMPERATURE: 97.9 F | BODY MASS INDEX: 34.84 KG/M2 | HEART RATE: 62 BPM | DIASTOLIC BLOOD PRESSURE: 80 MMHG

## 2019-03-14 DIAGNOSIS — R73.03 PREDIABETES: ICD-10-CM

## 2019-03-14 DIAGNOSIS — I51.89 DIASTOLIC DYSFUNCTION: ICD-10-CM

## 2019-03-14 DIAGNOSIS — E78.2 MIXED HYPERLIPIDEMIA: ICD-10-CM

## 2019-03-14 DIAGNOSIS — I10 ESSENTIAL HYPERTENSION: Primary | ICD-10-CM

## 2019-03-14 NOTE — PROGRESS NOTES
Sneha Bethea is a 66 y.o. female and presents with Follow Up Chronic Condition; Hypertension; Blood sugar problem; Cholesterol Problem; and Obesity       Subjective:    htn- doing well. On meds today. Prediabetes- gained wt. Trying to watch diet- changed portions/eating less. Obesity- trying to cut calories. Walking and got a fitbit. Didn't go see bariatric  Hyperlipidemia- on statin. No myalgia or abd pain.     lower abd pain- started last sept- went to [facility]- had xray and labs- was told it was muscular- it went away but has been returning every few months for about one week- bilateral lower abd pain, soreness, hurts to walk, no n/v, no f/c. No diarrhea or stool changes. Assessment/Plan:    Obesity - again encouraged caloric restriction, discussed meds/programs/surgery. Goal 1 lb /week wt loss. Revisit bariatric surgery if needed.   htn- well controlled - on meds- no changes. Prediabetes- recheck A1c unchanged 6.1%. Continue metformin and recheck A1c in 6 months. Hopefully will improve with continued wt loss. HM- mostly up to date- had 2 shingrix doses. Had cologuard  Hyperlipidemia- on statin- recheck FLP today      RTC in 6 mo. Orders Placed This Encounter    METABOLIC PANEL, COMPREHENSIVE     Standing Status:   Future     Number of Occurrences:   1     Standing Expiration Date:   3/14/2020    LIPID PANEL     Standing Status:   Future     Number of Occurrences:   1     Standing Expiration Date:   3/14/2020    HEMOGLOBIN A1C WITH EAG     Standing Status:   Future     Number of Occurrences:   1     Standing Expiration Date:   3/14/2020    MICROALBUMIN, UR, RAND W/ MICROALB/CREAT RATIO     Standing Status:   Future     Number of Occurrences:   1     Standing Expiration Date:   3/14/2020    CVD REPORT         Diagnoses and all orders for this visit:    1. Essential hypertension  -     METABOLIC PANEL, COMPREHENSIVE;  Future  -     MICROALBUMIN, UR, RAND W/ MICROALB/CREAT RATIO; Future    2. Prediabetes  -     HEMOGLOBIN A1C WITH EAG; Future    3. Mixed hyperlipidemia  -     METABOLIC PANEL, COMPREHENSIVE; Future  -     LIPID PANEL; Future    4. Diastolic dysfunction          ROS:  Negative except as mentioned above  Cardiac- no chest pain or palpitations  Pulmonary- no sob or wheezes  GI- no n/v or diarrhea. SH:  Social History     Tobacco Use    Smoking status: Never Smoker    Smokeless tobacco: Never Used   Substance Use Topics    Alcohol use: No    Drug use: No         Medications/Allergies:  Current Outpatient Medications on File Prior to Visit   Medication Sig Dispense Refill    hydroCHLOROthiazide (HYDRODIURIL) 25 mg tablet TAKE 1 TABLET BY MOUTH DAILY. 30 Tab 5    metFORMIN ER (GLUCOPHAGE XR) 500 mg tablet TAKE ONE TABLET BY MOUTH DAILY WITH DINNER 90 Tab 1    metoprolol succinate (TOPROL-XL) 25 mg XL tablet TAKE 1 TABLET EVERY DAY 30 Tab 5    simvastatin (ZOCOR) 10 mg tablet TAKE 1 TABLET BY MOUTH NIGHTLY. 90 Tab 3    amLODIPine (NORVASC) 5 mg tablet TAKE 1 TABLET BY MOUTH DAILY. 30 Tab 5    lisinopril (PRINIVIL, ZESTRIL) 40 mg tablet TAKE 1 TABLET BY MOUTH DAILY. 30 Tab 5    aspirin delayed-release 81 mg tablet Take 1 Tab by mouth daily. 1 Tab 0    multivitamin (ONE A DAY) tablet Take 1 Tab by mouth daily. No current facility-administered medications on file prior to visit. Allergies   Allergen Reactions    Amoxicillin Anaphylaxis       Objective:  Visit Vitals  /83   Pulse 62   Temp 97.9 °F (36.6 °C) (Oral)   Resp 17   Ht (P) 5' 4\" (1.626 m)   Wt 203 lb (92.1 kg)   BMI (P) 34.84 kg/m²    Body mass index is 34.84 kg/m² (pended). Constitutional: Well developed, nourished, no distress, alert   CV: S1, S2.  RRR. No murmurs/rubs. No thrills palpated. No carotid bruits. Intact distal pulses. No edema. Pulm: No abnormalities on inspection. Clear to auscultation bilaterally. No wheezing/rhonchi. Normal effort.      GI: Soft, nontender, nondistended. Normal active bowel sounds. No  masses on palpation. No hepatosplenomegaly.

## 2019-03-14 NOTE — PATIENT INSTRUCTIONS
DASH Diet: Care Instructions  Your Care Instructions    The DASH diet is an eating plan that can help lower your blood pressure. DASH stands for Dietary Approaches to Stop Hypertension. Hypertension is high blood pressure. The DASH diet focuses on eating foods that are high in calcium, potassium, and magnesium. These nutrients can lower blood pressure. The foods that are highest in these nutrients are fruits, vegetables, low-fat dairy products, nuts, seeds, and legumes. But taking calcium, potassium, and magnesium supplements instead of eating foods that are high in those nutrients does not have the same effect. The DASH diet also includes whole grains, fish, and poultry. The DASH diet is one of several lifestyle changes your doctor may recommend to lower your high blood pressure. Your doctor may also want you to decrease the amount of sodium in your diet. Lowering sodium while following the DASH diet can lower blood pressure even further than just the DASH diet alone. Follow-up care is a key part of your treatment and safety. Be sure to make and go to all appointments, and call your doctor if you are having problems. It's also a good idea to know your test results and keep a list of the medicines you take. How can you care for yourself at home? Following the DASH diet  · Eat 4 to 5 servings of fruit each day. A serving is 1 medium-sized piece of fruit, ½ cup chopped or canned fruit, 1/4 cup dried fruit, or 4 ounces (½ cup) of fruit juice. Choose fruit more often than fruit juice. · Eat 4 to 5 servings of vegetables each day. A serving is 1 cup of lettuce or raw leafy vegetables, ½ cup of chopped or cooked vegetables, or 4 ounces (½ cup) of vegetable juice. Choose vegetables more often than vegetable juice. · Get 2 to 3 servings of low-fat and fat-free dairy each day. A serving is 8 ounces of milk, 1 cup of yogurt, or 1 ½ ounces of cheese. · Eat 6 to 8 servings of grains each day.  A serving is 1 slice of bread, 1 ounce of dry cereal, or ½ cup of cooked rice, pasta, or cooked cereal. Try to choose whole-grain products as much as possible. · Limit lean meat, poultry, and fish to 2 servings each day. A serving is 3 ounces, about the size of a deck of cards. · Eat 4 to 5 servings of nuts, seeds, and legumes (cooked dried beans, lentils, and split peas) each week. A serving is 1/3 cup of nuts, 2 tablespoons of seeds, or ½ cup of cooked beans or peas. · Limit fats and oils to 2 to 3 servings each day. A serving is 1 teaspoon of vegetable oil or 2 tablespoons of salad dressing. · Limit sweets and added sugars to 5 servings or less a week. A serving is 1 tablespoon jelly or jam, ½ cup sorbet, or 1 cup of lemonade. · Eat less than 2,300 milligrams (mg) of sodium a day. If you limit your sodium to 1,500 mg a day, you can lower your blood pressure even more. Tips for success  · Start small. Do not try to make dramatic changes to your diet all at once. You might feel that you are missing out on your favorite foods and then be more likely to not follow the plan. Make small changes, and stick with them. Once those changes become habit, add a few more changes. · Try some of the following:  ? Make it a goal to eat a fruit or vegetable at every meal and at snacks. This will make it easy to get the recommended amount of fruits and vegetables each day. ? Try yogurt topped with fruit and nuts for a snack or healthy dessert. ? Add lettuce, tomato, cucumber, and onion to sandwiches. ? Combine a ready-made pizza crust with low-fat mozzarella cheese and lots of vegetable toppings. Try using tomatoes, squash, spinach, broccoli, carrots, cauliflower, and onions. ? Have a variety of cut-up vegetables with a low-fat dip as an appetizer instead of chips and dip. ? Sprinkle sunflower seeds or chopped almonds over salads. Or try adding chopped walnuts or almonds to cooked vegetables.   ? Try some vegetarian meals using beans and peas. Add garbanzo or kidney beans to salads. Make burritos and tacos with mashed ceballos beans or black beans. Where can you learn more? Go to http://yony-uyen.info/. Enter M260 in the search box to learn more about \"DASH Diet: Care Instructions. \"  Current as of: July 22, 2018  Content Version: 11.9  © 2558-8870 [employer]. Care instructions adapted under license by [employer] (which disclaims liability or warranty for this information). If you have questions about a medical condition or this instruction, always ask your healthcare professional. [address] any warranty or liability for your use of this information.

## 2019-03-14 NOTE — PROGRESS NOTES
1. Have you been to the ER, urgent care clinic since your last visit? Hospitalized since your last visit? No.     2. Have you seen or consulted any other health care providers outside of the [address] [place] [address] since your last visit? Include any pap smears or colon screening.  No.     Chief Complaint   Patient presents with    Follow Up Chronic Condition    Hypertension    Blood sugar problem    Cholesterol Problem    Obesity

## 2019-03-15 LAB
ALBUMIN SERPL-MCNC: 4.4 G/DL (ref 3.6–4.8)
ALBUMIN/CREAT UR: 4.2 MG/G CREAT (ref 0–30)
ALBUMIN/GLOB SERPL: 1.4 {RATIO} (ref 1.2–2.2)
ALP SERPL-CCNC: 57 IU/L (ref 39–117)
ALT SERPL-CCNC: 11 IU/L (ref 0–32)
AST SERPL-CCNC: 12 IU/L (ref 0–40)
BILIRUB SERPL-MCNC: 0.5 MG/DL (ref 0–1.2)
BUN SERPL-MCNC: 14 MG/DL (ref 8–27)
BUN/CREAT SERPL: 16 (ref 12–28)
CALCIUM SERPL-MCNC: 9.9 MG/DL (ref 8.7–10.3)
CHLORIDE SERPL-SCNC: 105 MMOL/L (ref 96–106)
CHOLEST SERPL-MCNC: 114 MG/DL (ref 100–199)
CO2 SERPL-SCNC: 26 MMOL/L (ref 20–29)
CREAT SERPL-MCNC: 0.88 MG/DL (ref 0.57–1)
CREAT UR-MCNC: 143.2 MG/DL
EST. AVERAGE GLUCOSE BLD GHB EST-MCNC: 128 MG/DL
GLOBULIN SER CALC-MCNC: 3.1 G/DL (ref 1.5–4.5)
GLUCOSE SERPL-MCNC: 99 MG/DL (ref 65–99)
HBA1C MFR BLD: 6.1 % (ref 4.8–5.6)
HDLC SERPL-MCNC: 53 MG/DL
INTERPRETATION, 910389: NORMAL
LDLC SERPL CALC-MCNC: 40 MG/DL (ref 0–99)
MICROALBUMIN UR-MCNC: 6 UG/ML
POTASSIUM SERPL-SCNC: 3.8 MMOL/L (ref 3.5–5.2)
PROT SERPL-MCNC: 7.5 G/DL (ref 6–8.5)
SODIUM SERPL-SCNC: 146 MMOL/L (ref 134–144)
TRIGL SERPL-MCNC: 107 MG/DL (ref 0–149)
VLDLC SERPL CALC-MCNC: 21 MG/DL (ref 5–40)

## 2019-05-08 RX ORDER — METFORMIN HYDROCHLORIDE 500 MG/1
TABLET, EXTENDED RELEASE ORAL
Qty: 30 TAB | Refills: 0 | Status: SHIPPED | OUTPATIENT
Start: 2019-05-08 | End: 2019-08-02 | Stop reason: SDUPTHER

## 2019-06-13 RX ORDER — LISINOPRIL 40 MG/1
TABLET ORAL
Qty: 30 TAB | Refills: 4 | Status: SHIPPED | OUTPATIENT
Start: 2019-06-13 | End: 2019-08-02 | Stop reason: SDUPTHER

## 2019-06-13 RX ORDER — AMLODIPINE BESYLATE 5 MG/1
TABLET ORAL
Qty: 30 TAB | Refills: 4 | Status: SHIPPED | OUTPATIENT
Start: 2019-06-13 | End: 2019-08-02 | Stop reason: SDUPTHER

## 2019-06-13 RX ORDER — HYDROCHLOROTHIAZIDE 25 MG/1
TABLET ORAL
Qty: 30 TAB | Refills: 4 | Status: SHIPPED | OUTPATIENT
Start: 2019-06-13 | End: 2019-08-02 | Stop reason: SDUPTHER

## 2019-08-07 RX ORDER — AMLODIPINE BESYLATE 5 MG/1
TABLET ORAL
Qty: 90 TAB | Refills: 1 | Status: SHIPPED | OUTPATIENT
Start: 2019-08-07 | End: 2019-12-23

## 2019-08-07 RX ORDER — SIMVASTATIN 10 MG/1
TABLET, FILM COATED ORAL
Qty: 90 TAB | Refills: 1 | Status: SHIPPED | OUTPATIENT
Start: 2019-08-07 | End: 2019-12-23

## 2019-08-07 RX ORDER — HYDROCHLOROTHIAZIDE 25 MG/1
TABLET ORAL
Qty: 90 TAB | Refills: 1 | Status: SHIPPED | OUTPATIENT
Start: 2019-08-07 | End: 2019-12-23

## 2019-08-07 RX ORDER — METFORMIN HYDROCHLORIDE 500 MG/1
TABLET, EXTENDED RELEASE ORAL
Qty: 90 TAB | Refills: 1 | Status: SHIPPED | OUTPATIENT
Start: 2019-08-07 | End: 2019-12-23

## 2019-08-07 RX ORDER — LISINOPRIL 40 MG/1
TABLET ORAL
Qty: 90 TAB | Refills: 1 | Status: SHIPPED | OUTPATIENT
Start: 2019-08-07 | End: 2019-12-23

## 2019-09-19 ENCOUNTER — OFFICE VISIT (OUTPATIENT)
Dept: FAMILY MEDICINE CLINIC | Age: 64
End: 2019-09-19

## 2019-09-19 VITALS
RESPIRATION RATE: 16 BRPM | HEART RATE: 65 BPM | BODY MASS INDEX: 36.19 KG/M2 | DIASTOLIC BLOOD PRESSURE: 83 MMHG | HEIGHT: 64 IN | SYSTOLIC BLOOD PRESSURE: 126 MMHG | WEIGHT: 212 LBS | TEMPERATURE: 98 F | OXYGEN SATURATION: 95 %

## 2019-09-19 DIAGNOSIS — I10 ESSENTIAL HYPERTENSION: ICD-10-CM

## 2019-09-19 DIAGNOSIS — R07.9 CHEST PAIN, UNSPECIFIED TYPE: ICD-10-CM

## 2019-09-19 DIAGNOSIS — R73.03 PREDIABETES: ICD-10-CM

## 2019-09-19 DIAGNOSIS — Z23 ENCOUNTER FOR IMMUNIZATION: Primary | ICD-10-CM

## 2019-09-19 DIAGNOSIS — E78.2 MIXED HYPERLIPIDEMIA: ICD-10-CM

## 2019-09-19 RX ORDER — METOPROLOL SUCCINATE 25 MG/1
TABLET, EXTENDED RELEASE ORAL
Qty: 90 TAB | Refills: 3 | Status: SHIPPED | OUTPATIENT
Start: 2019-09-19 | End: 2020-04-06 | Stop reason: SDUPTHER

## 2019-09-19 RX ORDER — NITROGLYCERIN 0.4 MG/1
0.4 TABLET SUBLINGUAL
Qty: 25 TAB | Refills: 1 | Status: SHIPPED | OUTPATIENT
Start: 2019-09-19 | End: 2019-09-19 | Stop reason: SDUPTHER

## 2019-09-19 RX ORDER — NITROGLYCERIN 0.4 MG/1
0.4 TABLET SUBLINGUAL
Qty: 25 TAB | Refills: 1 | Status: SHIPPED | OUTPATIENT
Start: 2019-09-19 | End: 2022-10-26

## 2019-09-19 NOTE — PROGRESS NOTES
Claudean Stabler is a 66 y.o. female and presents with Chest Pain (comes and goes in the 2 weeks. ); Shortness of Breath (walking up the stairs. ); and Headache       Subjective:    Chest pain- pressure in central chest - lasts up to 30 minutes. Associated with activity of walking up steps but not on treadmill. Gets nausea and h/a with pressure. Has been occurring for one months. 8/10. No other associated sx. Had negative exercise stress test about 2 years ago for different chest pain. Last episode was 6 days ago. Assessment/Plan:      Diagnoses and all orders for this visit:    1. Encounter for immunization  -     INFLUENZA VIRUS VAC QUAD,SPLIT,PRESV FREE SYRINGE IM  -     VA IMMUNIZ ADMIN,1 SINGLE/COMB VAC/TOXOID    2. Chest pain, unspecified type  -     AMB POC EKG ROUTINE W/ 12 LEADS, INTER & REP  -     METABOLIC PANEL, BASIC; Future  -     CBC W/O DIFF; Future  -     TROPONIN I; Future  -     NUCLEAR CARDIAC STRESS TEST; Future    3. Mixed hyperlipidemia    4. Essential hypertension    5. Prediabetes    Other orders  -     metoprolol succinate (TOPROL-XL) 25 mg XL tablet; TAKE 1 TABLET EVERY DAY  -     nitroglycerin (NITROSTAT) 0.4 mg SL tablet; 1 Tab by SubLINGual route every five (5) minutes as needed for Chest Pain (up to 3 doses). Up to 3 doses. -     CBC W/O DIFF  -     METABOLIC PANEL, BASIC  -     TROPONIN I        RTC in next week.     Orders Placed This Encounter    Influenza virus vaccine (QUADRIVALENT PRES FREE SYRINGE) IM ([id number])    METABOLIC PANEL, BASIC     Standing Status:   Future     Standing Expiration Date:   9/19/2020    CBC W/O DIFF     Standing Status:   Future     Standing Expiration Date:   9/19/2020    TROPONIN I     Standing Status:   Future     Standing Expiration Date:   9/19/2020    AMB POC EKG ROUTINE W/ 12 LEADS, INTER & REP     Order Specific Question:   Reason for Exam:     Answer:   chest pain    VA IMMUNIZ ADMIN,1 SINGLE/COMB VAC/TOXOID    metoprolol succinate (TOPROL-XL) 25 mg XL tablet     Sig: TAKE 1 TABLET EVERY DAY     Dispense:  90 Tab     Refill:  3               ROS:  Negative except as mentioned above  Cardiac- no cough or palpitations  Pulmonary- no sob or wheezes  GI- no n/v or diarrhea. SH:  Social History     Tobacco Use    Smoking status: Never Smoker    Smokeless tobacco: Never Used   Substance Use Topics    Alcohol use: No    Drug use: No         Medications/Allergies:  Current Outpatient Medications on File Prior to Visit   Medication Sig Dispense Refill    amLODIPine (NORVASC) 5 mg tablet TAKE 1 TABLET BY MOUTH DAILY. 90 Tab 1    hydroCHLOROthiazide (HYDRODIURIL) 25 mg tablet TAKE 1 TABLET BY MOUTH DAILY. 90 Tab 1    lisinopril (PRINIVIL, ZESTRIL) 40 mg tablet TAKE 1 TABLET BY MOUTH DAILY. 90 Tab 1    metFORMIN ER (GLUCOPHAGE XR) 500 mg tablet TAKE ONE TABLET BY MOUTH DAILY WITH DINNER 90 Tab 1    simvastatin (ZOCOR) 10 mg tablet TAKE 1 TABLET BY MOUTH EVERY NIGHT 90 Tab 1    aspirin delayed-release 81 mg tablet Take 1 Tab by mouth daily. 1 Tab 0    multivitamin (ONE A DAY) tablet Take 1 Tab by mouth daily. No current facility-administered medications on file prior to visit. Allergies   Allergen Reactions    Amoxicillin Anaphylaxis       Objective:  Visit Vitals  /83   Pulse 65   Temp 98 °F (36.7 °C) (Oral)   Resp 16   Ht 5' 4\" (1.626 m)   Wt 212 lb (96.2 kg)   SpO2 95%   BMI 36.39 kg/m²    Body mass index is 36.39 kg/m². Constitutional: Well developed, nourished, no distress, alert   HENT: Exterior ears and tympanic membranes normal bilaterally. Supple neck. No thyromegaly or lymphadenopathy. Oropharynx clear and moist mucous membranes. Eyes: Conjunctiva normal. PERRL. CV: S1, S2.  RRR. No murmurs/rubs. No thrills palpated. No carotid bruits. Intact distal pulses. No edema. Pulm: No abnormalities on inspection. Clear to auscultation bilaterally. No wheezing/rhonchi. Normal effort.      GI: Soft, nontender, nondistended. Normal active bowel sounds. No  masses on palpation. No hepatosplenomegaly.

## 2019-09-19 NOTE — PROGRESS NOTES
1. Have you been to the ER, urgent care clinic since your last visit? Hospitalized since your last visit? No.     2. Have you seen or consulted any other health care providers outside of the [address] [place] [address] [place] since your last visit? Include any pap smears or colon screening. No.     Chief Complaint   Patient presents with    Chest Pain     comes and goes in the 2 weeks.  Shortness of Breath     walking up the stairs.  Headache       Iesha Vernon is a 66 y.o. female who presents for routine immunizations. She denies any symptoms , reactions or allergies that would exclude them from being immunized today. Risks and adverse reactions were discussed and the VIS was given to them. All questions were addressed. She was observed for 20 min post injection. There were no reactions observed. Kelli Hightower CMA.

## 2019-09-19 NOTE — PATIENT INSTRUCTIONS
Avoid strenuous activity until stress test results are in. Continue the Aspirin.  the nitroglycerin    call [facility] scheduling [phone] if you do not hear from them by tomorrow to schedule the stress test and let us know if you have any difficulty getting the stress test done by next week. If the chest pain comes on and does not go away after 2 nitroglycerin tablets go to the emergency room and call 911. Chest Pain: Care Instructions  Your Care Instructions    There are many things that can cause chest pain. Some are not serious and will get better on their own in a few days. But some kinds of chest pain need more testing and treatment. Your doctor may have recommended a follow-up visit in the next 8 to 12 hours. If you are not getting better, you may need more tests or treatment. Even though your doctor has released you, you still need to watch for any problems. The doctor carefully checked you, but sometimes problems can develop later. If you have new symptoms or if your symptoms do not get better, get medical care right away. If you have worse or different chest pain or pressure that lasts more than 5 minutes or you passed out (lost consciousness), call 911 or seek other emergency help right away. A medical visit is only one step in your treatment. Even if you feel better, you still need to do what your doctor recommends, such as going to all suggested follow-up appointments and taking medicines exactly as directed. This will help you recover and help prevent future problems. How can you care for yourself at home? · Rest until you feel better. · Take your medicine exactly as prescribed. Call your doctor if you think you are having a problem with your medicine. · Do not drive after taking a prescription pain medicine. When should you call for help? Call 911 if:    · You passed out (lost consciousness).     · You have severe difficulty breathing.     · You have symptoms of a heart attack. These may include:  ? Chest pain or pressure, or a strange feeling in your chest.  ? Sweating. ? Shortness of breath. ? Nausea or vomiting. ? Pain, pressure, or a strange feeling in your back, neck, jaw, or upper belly or in one or both shoulders or arms. ? Lightheadedness or sudden weakness. ? A fast or irregular heartbeat. After you call 911, the  may tell you to chew 1 adult-strength or 2 to 4 low-dose aspirin. Wait for an ambulance. Do not try to drive yourself.    Call your doctor today if:    · You have any trouble breathing.     · Your chest pain gets worse.     · You are dizzy or lightheaded, or you feel like you may faint.     · You are not getting better as expected.     · You are having new or different chest pain. Where can you learn more? Go to http://yony-uyen.info/. Enter A120 in the search box to learn more about \"Chest Pain: Care Instructions. \"  Current as of: September 23, 2018  Content Version: 12.1  © 9023-0664 [employer]. Care instructions adapted under license by [employer] (which disclaims liability or warranty for this information). If you have questions about a medical condition or this instruction, always ask your healthcare professional. [address] any warranty or liability for your use of this information.

## 2019-09-20 LAB
BUN SERPL-MCNC: 12 MG/DL (ref 8–27)
BUN/CREAT SERPL: 15 (ref 12–28)
CALCIUM SERPL-MCNC: 9.9 MG/DL (ref 8.7–10.3)
CHLORIDE SERPL-SCNC: 104 MMOL/L (ref 96–106)
CO2 SERPL-SCNC: 24 MMOL/L (ref 20–29)
CREAT SERPL-MCNC: 0.81 MG/DL (ref 0.57–1)
ERYTHROCYTE [DISTWIDTH] IN BLOOD BY AUTOMATED COUNT: 13.9 % (ref 12.3–15.4)
GLUCOSE SERPL-MCNC: 87 MG/DL (ref 65–99)
HCT VFR BLD AUTO: 40.3 % (ref 34–46.6)
HGB BLD-MCNC: 12.9 G/DL (ref 11.1–15.9)
MCH RBC QN AUTO: 25.1 PG (ref 26.6–33)
MCHC RBC AUTO-ENTMCNC: 32 G/DL (ref 31.5–35.7)
MCV RBC AUTO: 78 FL (ref 79–97)
PLATELET # BLD AUTO: 358 X10E3/UL (ref 150–450)
POTASSIUM SERPL-SCNC: 4 MMOL/L (ref 3.5–5.2)
RBC # BLD AUTO: 5.14 X10E6/UL (ref 3.77–5.28)
SODIUM SERPL-SCNC: 143 MMOL/L (ref 134–144)
TROPONIN I SERPL-MCNC: <0.01 NG/ML (ref 0–0.04)
WBC # BLD AUTO: 9.4 X10E3/UL (ref 3.4–10.8)

## 2019-09-27 ENCOUNTER — OFFICE VISIT (OUTPATIENT)
Dept: FAMILY MEDICINE CLINIC | Age: 64
End: 2019-09-27

## 2019-09-27 VITALS
OXYGEN SATURATION: 97 % | BODY MASS INDEX: 36.7 KG/M2 | SYSTOLIC BLOOD PRESSURE: 115 MMHG | WEIGHT: 215 LBS | RESPIRATION RATE: 16 BRPM | HEART RATE: 59 BPM | TEMPERATURE: 97.1 F | DIASTOLIC BLOOD PRESSURE: 76 MMHG | HEIGHT: 64 IN

## 2019-09-27 DIAGNOSIS — R07.9 CHEST PAIN, UNSPECIFIED TYPE: ICD-10-CM

## 2019-09-27 DIAGNOSIS — M79.10 MYALGIA: Primary | ICD-10-CM

## 2019-09-27 NOTE — PROGRESS NOTES
Cristin Virk is a 66 y.o. female and presents with Chest Pain (resolved) and Results (labs)       Subjective:    Chest pain- pressure in central chest - lasts up to 30 minutes. Associated with activity of walking up steps but not on treadmill. Gets nausea and h/a with pressure. Has been occurring for one months. 8/10. No other associated sx. Had negative exercise stress test about 2 years ago for different chest pain. Last episode was 6 days ago. Having pain in upper arms bilaterally for about 2 months. Aching pain. Also some in neck. No visual changes. No jaw claudication. No headache    Assessment/Plan:      Diagnoses and all orders for this visit:    1. Myalgia-relatively mild symptoms but will rule out polymyalgia rheumatica with labs and patient will call if not improving or new symptoms develop  -     SED RATE (ESR); Future  -     CRP, HIGH SENSITIVITY; Future    2. Chest pain, unspecified type-await stress test results next week-patient will call for any changes          Orders Placed This Encounter    SED RATE (ESR)     Standing Status:   Future     Number of Occurrences:   1     Standing Expiration Date:   9/27/2020    CRP, HIGH SENSITIVITY     Standing Status:   Future     Number of Occurrences:   1     Standing Expiration Date:   9/27/2020               ROS:  Negative except as mentioned above  Cardiac- no cough or palpitations  Pulmonary- no sob or wheezes  GI- no n/v or diarrhea. SH:  Social History     Tobacco Use    Smoking status: Never Smoker    Smokeless tobacco: Never Used   Substance Use Topics    Alcohol use: No    Drug use: No         Medications/Allergies:  Current Outpatient Medications on File Prior to Visit   Medication Sig Dispense Refill    metoprolol succinate (TOPROL-XL) 25 mg XL tablet TAKE 1 TABLET EVERY DAY 90 Tab 3    nitroglycerin (NITROSTAT) 0.4 mg SL tablet 1 Tab by SubLINGual route every five (5) minutes as needed for Chest Pain (up to 3 doses).  Up to 3 doses. 25 Tab 1    amLODIPine (NORVASC) 5 mg tablet TAKE 1 TABLET BY MOUTH DAILY. 90 Tab 1    hydroCHLOROthiazide (HYDRODIURIL) 25 mg tablet TAKE 1 TABLET BY MOUTH DAILY. 90 Tab 1    lisinopril (PRINIVIL, ZESTRIL) 40 mg tablet TAKE 1 TABLET BY MOUTH DAILY. 90 Tab 1    metFORMIN ER (GLUCOPHAGE XR) 500 mg tablet TAKE ONE TABLET BY MOUTH DAILY WITH DINNER 90 Tab 1    simvastatin (ZOCOR) 10 mg tablet TAKE 1 TABLET BY MOUTH EVERY NIGHT 90 Tab 1    aspirin delayed-release 81 mg tablet Take 1 Tab by mouth daily. 1 Tab 0    multivitamin (ONE A DAY) tablet Take 1 Tab by mouth daily. No current facility-administered medications on file prior to visit. Allergies   Allergen Reactions    Amoxicillin Anaphylaxis       Objective:  Visit Vitals  /76   Pulse (!) 59   Temp 97.1 °F (36.2 °C) (Oral)   Resp 16   Ht 5' 4\" (1.626 m)   Wt 215 lb (97.5 kg)   SpO2 97%   BMI 36.90 kg/m²    Body mass index is 36.9 kg/m². Constitutional: Well developed, nourished, no distress, alert   HENT: Exterior ears and tympanic membranes normal bilaterally. Supple neck. No thyromegaly or lymphadenopathy. Oropharynx clear and moist mucous membranes. No temporal artery cording or tenderness   CV: S1, S2.  RRR. No murmurs/rubs. No thrills palpated. No carotid bruits. Intact distal pulses. No edema. Pulm: No abnormalities on inspection. Clear to auscultation bilaterally. No wheezing/rhonchi. Normal effort. GI: Soft, nontender, nondistended. Normal active bowel sounds. No  masses on palpation. No hepatosplenomegaly.

## 2019-09-27 NOTE — PATIENT INSTRUCTIONS
Polymyalgia Rheumatica: Care Instructions  Your Care Instructions  Polymyalgia rheumatica causes pain and swelling in joints and muscles, mainly in the hips, neck, and shoulders. Pain and swelling may be worse in the morning. This condition can occur quickly and often lasts for a year or two. Your doctor will treat you with medicine to reduce swelling. Your symptoms should get much better in 1 to 3 days and go away in 2 to 4 weeks. Still, you may need to take medicine to prevent it from coming back. You may be on the medicine for 1 to 2 years or longer. Some people who have this also get giant cell arteritis (temporal arteritis), which causes swelling of some blood vessels in the head. Tell your doctor if you have any headaches, jaw pain, or tightness or tenderness along the temple or scalp. This condition can cause blindness if it is not treated. Tell your doctor if you have problems with your vision, including blurring or seeing double. Follow-up care is a key part of your treatment and safety. Be sure to make and go to all appointments, and call your doctor if you are having problems. It's also a good idea to know your test results and keep a list of the medicines you take. How can you care for yourself at home? · Take your medicines exactly as prescribed. Call your doctor if you think you are having a problem with your medicine. You may get medicines to reduce pain and to keep your bones from getting thin. · Take anti-inflammatory medicines to reduce pain, if your doctor recommends them. These include ibuprofen (Advil, Motrin) and naproxen (Aleve). Read and follow all instructions on the label. · Eat a healthy diet. Make sure to drink milk and eat dairy products, such as low-fat cheese and yogurt. Ask your doctor how much calcium you need. If you cannot eat dairy products or you do not get enough calcium from food, you may take pills. · Do gentle weight lifting to protect your bones.  This is very important for women who have gone through menopause. · Do not smoke or allow others to smoke around you. If you need help quitting, talk to your doctor about stop-smoking programs and medicines. These can increase your chances of quitting for good. When should you call for help? Call your doctor now or seek immediate medical care if:    · You have a headache, jaw pain, or problems seeing.    Watch closely for changes in your health, and be sure to contact your doctor if:    · Your joint and muscle pain or stiffness gets worse.     · You have side effects from your corticosteroid medicine, such as:  ? Signs of diabetes (feeling thirsty all the time, needing to urinate often). ? Signs of infection (fever, chills, cough, burning during urination, severe sore throat, or skin infection). ? A large weight gain. ? Mood changes. ? Trouble sleeping. ? Bruising easily.     · You have any other problems with your medicine.     · You do not get better as expected. Where can you learn more? Go to http://yoyn-uyen.info/. Enter M396 in the search box to learn more about \"Polymyalgia Rheumatica: Care Instructions. \"  Current as of: April 1, 2019  Content Version: 12.2  © 6169-0023 [employer]. Care instructions adapted under license by [employer] (which disclaims liability or warranty for this information). If you have questions about a medical condition or this instruction, always ask your healthcare professional. [place] [address] any warranty or liability for your use of this information.

## 2019-09-28 LAB
CRP SERPL HS-MCNC: 3.82 MG/L (ref 0–3)
ERYTHROCYTE [SEDIMENTATION RATE] IN BLOOD BY WESTERGREN METHOD: 6 MM/HR (ref 0–40)

## 2019-10-09 RX ORDER — NITROGLYCERIN 0.4 MG/1
TABLET SUBLINGUAL
Qty: 50 TAB | Refills: 0 | Status: SHIPPED | OUTPATIENT
Start: 2019-10-09 | End: 2021-05-27 | Stop reason: SDUPTHER

## 2019-12-23 RX ORDER — METFORMIN HYDROCHLORIDE 500 MG/1
TABLET, EXTENDED RELEASE ORAL
Qty: 90 TAB | Refills: 1 | Status: SHIPPED | OUTPATIENT
Start: 2019-12-23 | End: 2020-06-09

## 2019-12-23 RX ORDER — LISINOPRIL 40 MG/1
TABLET ORAL
Qty: 90 TAB | Refills: 1 | Status: SHIPPED | OUTPATIENT
Start: 2019-12-23 | End: 2020-06-09

## 2019-12-23 RX ORDER — AMLODIPINE BESYLATE 5 MG/1
TABLET ORAL
Qty: 90 TAB | Refills: 1 | Status: SHIPPED | OUTPATIENT
Start: 2019-12-23 | End: 2020-06-09

## 2019-12-23 RX ORDER — HYDROCHLOROTHIAZIDE 25 MG/1
TABLET ORAL
Qty: 90 TAB | Refills: 1 | Status: SHIPPED | OUTPATIENT
Start: 2019-12-23 | End: 2020-06-09

## 2019-12-23 RX ORDER — SIMVASTATIN 10 MG/1
TABLET, FILM COATED ORAL
Qty: 90 TAB | Refills: 1 | Status: SHIPPED | OUTPATIENT
Start: 2019-12-23 | End: 2020-06-09

## 2020-01-06 ENCOUNTER — OFFICE VISIT (OUTPATIENT)
Dept: FAMILY MEDICINE CLINIC | Age: 65
End: 2020-01-06

## 2020-01-06 VITALS
BODY MASS INDEX: 37.05 KG/M2 | TEMPERATURE: 99 F | RESPIRATION RATE: 17 BRPM | OXYGEN SATURATION: 95 % | WEIGHT: 217 LBS | HEIGHT: 64 IN | SYSTOLIC BLOOD PRESSURE: 119 MMHG | DIASTOLIC BLOOD PRESSURE: 75 MMHG | HEART RATE: 77 BPM

## 2020-01-06 DIAGNOSIS — E66.9 OBESITY (BMI 30-39.9): ICD-10-CM

## 2020-01-06 DIAGNOSIS — R73.03 PREDIABETES: ICD-10-CM

## 2020-01-06 DIAGNOSIS — E78.2 MIXED HYPERLIPIDEMIA: ICD-10-CM

## 2020-01-06 DIAGNOSIS — I10 ESSENTIAL HYPERTENSION: ICD-10-CM

## 2020-01-06 DIAGNOSIS — R05.9 COUGH: ICD-10-CM

## 2020-01-06 DIAGNOSIS — M79.10 MYALGIA: Primary | ICD-10-CM

## 2020-01-06 LAB
QUICKVUE INFLUENZA TEST: NEGATIVE
VALID INTERNAL CONTROL?: YES

## 2020-01-06 NOTE — PATIENT INSTRUCTIONS
Bronchitis: Care Instructions  Your Care Instructions    Bronchitis is inflammation of the bronchial tubes, which carry air to the lungs. The tubes swell and produce mucus, or phlegm. The mucus and inflamed bronchial tubes make you cough. You may have trouble breathing. Most cases of bronchitis are caused by viruses like those that cause colds. Antibiotics usually do not help and they may be harmful. Bronchitis usually develops rapidly and lasts about 2 to 3 weeks in otherwise healthy people. Follow-up care is a key part of your treatment and safety. Be sure to make and go to all appointments, and call your doctor if you are having problems. It's also a good idea to know your test results and keep a list of the medicines you take. How can you care for yourself at home? · Take all medicines exactly as prescribed. Call your doctor if you think you are having a problem with your medicine. · Get some extra rest.  · Take an over-the-counter pain medicine, such as acetaminophen (Tylenol), ibuprofen (Advil, Motrin), or naproxen (Aleve) to reduce fever and relieve body aches. Read and follow all instructions on the label. · Do not take two or more pain medicines at the same time unless the doctor told you to. Many pain medicines have acetaminophen, which is Tylenol. Too much acetaminophen (Tylenol) can be harmful. · Take an over-the-counter cough medicine that contains dextromethorphan to help quiet a dry, hacking cough so that you can sleep. Avoid cough medicines that have more than one active ingredient. Read and follow all instructions on the label. · Breathe moist air from a humidifier, hot shower, or sink filled with hot water. The heat and moisture will thin mucus so you can cough it out. · Do not smoke. Smoking can make bronchitis worse. If you need help quitting, talk to your doctor about stop-smoking programs and medicines. These can increase your chances of quitting for good.   When should you call for help? Call 911 anytime you think you may need emergency care. For example, call if:    · You have severe trouble breathing.    Call your doctor now or seek immediate medical care if:    · You have new or worse trouble breathing.     · You cough up dark brown or bloody mucus (sputum).     · You have a new or higher fever.     · You have a new rash.    Watch closely for changes in your health, and be sure to contact your doctor if:    · You cough more deeply or more often, especially if you notice more mucus or a change in the color of your mucus.     · You are not getting better as expected. Where can you learn more? Go to http://yony-uyen.info/. Enter H333 in the search box to learn more about \"Bronchitis: Care Instructions. \"  Current as of: June 9, 2019  Content Version: 12.2  © 7925-8070 [employer]. Care instructions adapted under license by [employer] (which disclaims liability or warranty for this information). If you have questions about a medical condition or this instruction, always ask your healthcare professional. [address] any warranty or liability for your use of this information.

## 2020-01-06 NOTE — PROGRESS NOTES
Nora Sheffield is a 60 y.o. female and presents with Follow Up Chronic Condition; Hypertension; Cholesterol Problem; Blood sugar problem; Results (labs done ); and Cough (with yellowish phlegm)       Subjective:    htn- doing well. On meds today. Prediabetes- gained wt. Trying to watch diet- changed portions/eating less. Obesity- trying to cut calories. Walking and got a fitbit. Didn't go see bariatric- gained weight. Hyperlipidemia- on statin. No myalgia or abd pain.    cough- dry, for 3 days. No f/c. No cp or sob. No other sx. Assessment/Plan:    Obesity - again encouraged caloric restriction, discussed meds/programs/surgery. Goal 1 lb /week wt loss. Revisit bariatric surgery if needed.   htn- well controlled - on meds- no changes. Prediabetes- recheck A1c unchanged 6.1%. Continue metformin and recheck A1c in 6 months. Hopefully will improve with continued wt loss. Hyperlipidemia- on statin- recheck FLP today    bronchitis- pt will monitor for improvement and call if worsening or not improving. RTC in 6 mo. Orders Placed This Encounter    AMB POC RAPID INFLUENZA TEST         Diagnoses and all orders for this visit:    1. Myalgia  -     AMB POC RAPID INFLUENZA TEST    2. Cough  -     AMB POC RAPID INFLUENZA TEST    3. Prediabetes    4. Mixed hyperlipidemia    5. Essential hypertension    6. Obesity (BMI 30-39. 9)          ROS:  Negative except as mentioned above  Cardiac- no chest pain or palpitations  Pulmonary- no sob or wheezes  GI- no n/v or diarrhea.       SH:  Social History     Tobacco Use    Smoking status: Never Smoker    Smokeless tobacco: Never Used   Substance Use Topics    Alcohol use: No    Drug use: No         Medications/Allergies:  Current Outpatient Medications on File Prior to Visit   Medication Sig Dispense Refill    hydroCHLOROthiazide (HYDRODIURIL) 25 mg tablet TAKE 1 TABLET BY MOUTH  DAILY 90 Tab 1    metFORMIN ER (GLUCOPHAGE XR) 500 mg tablet TAKE 1 TABLET BY MOUTH  DAILY WITH DINNER 90 Tab 1    lisinopril (PRINIVIL, ZESTRIL) 40 mg tablet TAKE 1 TABLET BY MOUTH  DAILY 90 Tab 1    simvastatin (ZOCOR) 10 mg tablet TAKE 1 TABLET BY MOUTH  NIGHTLY 90 Tab 1    amLODIPine (NORVASC) 5 mg tablet TAKE 1 TABLET BY MOUTH  DAILY 90 Tab 1    nitroglycerin (NITROSTAT) 0.4 mg SL tablet DISSOLVE 1 TABLET UNDER THE TONGUE EVERY 5 MIN AS  NEEDED FOR CHEST PAIN UP TO 3 TABS IN 15 MIN. CALL 911  IF CHEST PAIN PERSISTS 50 Tab 0    metoprolol succinate (TOPROL-XL) 25 mg XL tablet TAKE 1 TABLET EVERY DAY 90 Tab 3    nitroglycerin (NITROSTAT) 0.4 mg SL tablet 1 Tab by SubLINGual route every five (5) minutes as needed for Chest Pain (up to 3 doses). Up to 3 doses. 25 Tab 1    aspirin delayed-release 81 mg tablet Take 1 Tab by mouth daily. 1 Tab 0    multivitamin (ONE A DAY) tablet Take 1 Tab by mouth daily. No current facility-administered medications on file prior to visit. Allergies   Allergen Reactions    Amoxicillin Anaphylaxis       Objective:  Visit Vitals  /75   Pulse 77   Temp 99 °F (37.2 °C) (Oral)   Resp 17   Ht 5' 4\" (1.626 m)   Wt 217 lb (98.4 kg)   SpO2 95%   BMI 37.25 kg/m²    Body mass index is 37.25 kg/m². Constitutional: Well developed, nourished, no distress, alert   CV: S1, S2.  RRR. No murmurs/rubs. No thrills palpated. No carotid bruits. Intact distal pulses. No edema. Pulm: No abnormalities on inspection. Clear to auscultation bilaterally. No wheezing/rhonchi. Normal effort. GI: Soft, nontender, nondistended. Normal active bowel sounds. No  masses on palpation. No hepatosplenomegaly.

## 2020-01-06 NOTE — PROGRESS NOTES
1. Have you been to the ER, urgent care clinic since your last visit? Hospitalized since your last visit? No.     2. Have you seen or consulted any other health care providers outside of the [address] [place] [address] since your last visit? Include any pap smears or colon screening.  No.     Chief Complaint   Patient presents with    Follow Up Chronic Condition    Hypertension    Cholesterol Problem    Blood sugar problem    Results     labs done

## 2020-03-23 ENCOUNTER — HOSPITAL ENCOUNTER (OUTPATIENT)
Dept: LAB | Age: 65
Discharge: HOME | End: 2020-03-23
Payer: COMMERCIAL

## 2020-03-23 LAB
CRP SERPL HS-MCNC: 5.8 MG/L
ERYTHROCYTE [SEDIMENTATION RATE] IN BLOOD: 7 MM/HR (ref 0–30)

## 2020-03-23 PROCEDURE — 85652 RBC SED RATE AUTOMATED: CPT

## 2020-03-23 PROCEDURE — 86141 C-REACTIVE PROTEIN HS: CPT

## 2020-03-23 PROCEDURE — 36415 COLL VENOUS BLD VENIPUNCTURE: CPT

## 2020-03-26 DIAGNOSIS — E78.2 MIXED HYPERLIPIDEMIA: ICD-10-CM

## 2020-03-26 DIAGNOSIS — R73.03 PREDIABETES: Primary | ICD-10-CM

## 2020-04-06 ENCOUNTER — VIRTUAL VISIT (OUTPATIENT)
Dept: FAMILY MEDICINE CLINIC | Age: 65
End: 2020-04-06

## 2020-04-06 DIAGNOSIS — I10 ESSENTIAL HYPERTENSION: Primary | ICD-10-CM

## 2020-04-06 DIAGNOSIS — R73.03 PREDIABETES: ICD-10-CM

## 2020-04-06 DIAGNOSIS — E66.9 OBESITY (BMI 30-39.9): ICD-10-CM

## 2020-04-06 DIAGNOSIS — E78.2 MIXED HYPERLIPIDEMIA: ICD-10-CM

## 2020-04-06 RX ORDER — METOPROLOL SUCCINATE 25 MG/1
TABLET, EXTENDED RELEASE ORAL
Qty: 90 TAB | Refills: 3 | Status: SHIPPED | OUTPATIENT
Start: 2020-04-06 | End: 2021-03-16 | Stop reason: SDUPTHER

## 2020-04-06 NOTE — PROGRESS NOTES
1. Have you been to the ER, urgent care clinic since your last visit? Hospitalized since your last visit? No    2. Have you seen or consulted any other health care providers outside of the [address] [place] [address] since your last visit? Include any pap smears or colon screening.  No    Chief Complaint   Patient presents with    Follow Up Chronic Condition    Hypertension    Blood sugar problem    Cholesterol Problem    Results     labs

## 2020-04-06 NOTE — PROGRESS NOTES
Lupe Pearson is a 60 y.o. female and presents with Follow Up Chronic Condition; Hypertension; Blood sugar problem; Cholesterol Problem; and Results (labs)       Subjective:    htn- doing well. On meds today. Prediabetes- gained wt. Trying to watch diet- changed portions/eating less. Obesity- trying to cut calories. Walking and got a fitbit. Didn't go see bariatric- gained weight. Hyperlipidemia- on statin. No myalgia or abd pain. Assessment/Plan:    Obesity - again encouraged caloric restriction, discussed meds/programs/surgery. Goal 1 lb /week wt loss. Revisit bariatric surgery if needed.   htn- well controlled - on meds- no changes. Prediabetes- recheck A1c unchanged 6.1%. Continue metformin and recheck A1c in 6 months. Hopefully will improve with continued wt loss. Hyperlipidemia- on statin- recheck FLP next visit. RTC in 4 months- with labs. Orders Placed This Encounter    AMB SUPPLY ORDER     Ambulatory blood pressure monitor for upper arm- use to check blood pressure daily    metoprolol succinate (TOPROL-XL) 25 mg XL tablet     Sig: TAKE 1 TABLET EVERY DAY     Dispense:  90 Tab     Refill:  3       Diagnoses and all orders for this visit:    1. Essential hypertension  -     AMB SUPPLY ORDER    2. Mixed hyperlipidemia    3. Prediabetes    4. Obesity (BMI 30-39. 9)    Other orders  -     metoprolol succinate (TOPROL-XL) 25 mg XL tablet; TAKE 1 TABLET EVERY DAY          ROS:  Negative except as mentioned above  Cardiac- no chest pain or palpitations  Pulmonary- no sob or wheezes  GI- no n/v or diarrhea.       SH:  Social History     Tobacco Use    Smoking status: Never Smoker    Smokeless tobacco: Never Used   Substance Use Topics    Alcohol use: No    Drug use: No         Medications/Allergies:  Current Outpatient Medications on File Prior to Visit   Medication Sig Dispense Refill    hydroCHLOROthiazide (HYDRODIURIL) 25 mg tablet TAKE 1 TABLET BY MOUTH  DAILY 90 Tab 1    metFORMIN ER (GLUCOPHAGE XR) 500 mg tablet TAKE 1 TABLET BY MOUTH  DAILY WITH DINNER 90 Tab 1    lisinopril (PRINIVIL, ZESTRIL) 40 mg tablet TAKE 1 TABLET BY MOUTH  DAILY 90 Tab 1    simvastatin (ZOCOR) 10 mg tablet TAKE 1 TABLET BY MOUTH  NIGHTLY 90 Tab 1    amLODIPine (NORVASC) 5 mg tablet TAKE 1 TABLET BY MOUTH  DAILY 90 Tab 1    metoprolol succinate (TOPROL-XL) 25 mg XL tablet TAKE 1 TABLET EVERY DAY 90 Tab 3    nitroglycerin (NITROSTAT) 0.4 mg SL tablet 1 Tab by SubLINGual route every five (5) minutes as needed for Chest Pain (up to 3 doses). Up to 3 doses. 25 Tab 1    aspirin delayed-release 81 mg tablet Take 1 Tab by mouth daily. 1 Tab 0    multivitamin (ONE A DAY) tablet Take 1 Tab by mouth daily.  nitroglycerin (NITROSTAT) 0.4 mg SL tablet DISSOLVE 1 TABLET UNDER THE TONGUE EVERY 5 MIN AS  NEEDED FOR CHEST PAIN UP TO 3 TABS IN 15 MIN. CALL 911  IF CHEST PAIN PERSISTS 50 Tab 0     No current facility-administered medications on file prior to visit. Allergies   Allergen Reactions    Amoxicillin Anaphylaxis       Objective: There were no vitals taken for this visit. There is no height or weight on file to calculate BMI. Constitutional: Well developed, nourished, no distress, alert         Consent: Miesha Holt, who was seen by synchronous (real-time) audio-video technology, and/or her healthcare decision maker, is aware that this patient-initiated, Telehealth encounter on 4/6/2020 is a billable service, with coverage as determined by her insurance carrier. She is aware that she may receive a bill and has provided verbal consent to proceed: Yes. Assessment & Plan:         712  Subjective:   Miesha Holt is a 60 y.o. female who was seen for Follow Up Chronic Condition; Hypertension; Blood sugar problem; Cholesterol Problem; and Results (labs)      Prior to Admission medications    Medication Sig Start Date End Date Taking?  Authorizing Provider   hydroCHLOROthiazide (HYDRODIURIL) 25 mg tablet TAKE 1 TABLET BY MOUTH  DAILY 12/23/19  Yes Eugene Oliveira MD   metFORMIN ER (GLUCOPHAGE XR) 500 mg tablet TAKE 1 TABLET BY MOUTH  DAILY WITH DINNER 12/23/19  Yes Eugene Oliveira MD   lisinopril (PRINIVIL, ZESTRIL) 40 mg tablet TAKE 1 TABLET BY MOUTH  DAILY 12/23/19  Yes Eugene Oliveira MD   simvastatin (ZOCOR) 10 mg tablet TAKE 1 TABLET BY MOUTH  NIGHTLY 12/23/19  Yes Eugene Oliveira MD   amLODIPine (NORVASC) 5 mg tablet TAKE 1 TABLET BY MOUTH  DAILY 12/23/19  Yes Eugene Oliveira MD   metoprolol succinate (TOPROL-XL) 25 mg XL tablet TAKE 1 TABLET EVERY DAY 9/19/19  Yes Eugene Oliveira MD   nitroglycerin (NITROSTAT) 0.4 mg SL tablet 1 Tab by SubLINGual route every five (5) minutes as needed for Chest Pain (up to 3 doses). Up to 3 doses. 9/19/19  Yes Eugene Oliveira MD   aspirin delayed-release 81 mg tablet Take 1 Tab by mouth daily. 12/30/16  Yes Eugene Oliveira MD   multivitamin (ONE A DAY) tablet Take 1 Tab by mouth daily. Yes Provider, Historical   nitroglycerin (NITROSTAT) 0.4 mg SL tablet DISSOLVE 1 TABLET UNDER THE TONGUE EVERY 5 MIN AS  NEEDED FOR CHEST PAIN UP TO 3 TABS IN 15 MIN. CALL 911  IF CHEST PAIN PERSISTS 10/9/19   Eugene Oliveira MD     Allergies   Allergen Reactions    Amoxicillin Anaphylaxis       Patient Active Problem List   Diagnosis Code    Diastolic dysfunction U70.09    History of hysterectomy Z90.710    Essential hypertension I10    Hyperlipidemia E78.5    Prediabetes R73.03    Baker's cyst of knee M71.20    Severe obesity (BMI 35.0-39. 9) E66.01     Patient Active Problem List    Diagnosis Date Noted    Severe obesity (BMI 35.0-39.9) 08/07/2018    Baker's cyst of knee 05/23/2017    Prediabetes 07/21/2016    Hyperlipidemia 10/05/2015    Essential hypertension 06/04/2015    History of hysterectomy 62/12/0416    Diastolic dysfunction 36/54/0419     Current Outpatient Medications   Medication Sig Dispense Refill    metoprolol succinate (TOPROL-XL) 25 mg XL tablet TAKE 1 TABLET EVERY DAY 90 Tab 3    hydroCHLOROthiazide (HYDRODIURIL) 25 mg tablet TAKE 1 TABLET BY MOUTH  DAILY 90 Tab 1    metFORMIN ER (GLUCOPHAGE XR) 500 mg tablet TAKE 1 TABLET BY MOUTH  DAILY WITH DINNER 90 Tab 1    lisinopril (PRINIVIL, ZESTRIL) 40 mg tablet TAKE 1 TABLET BY MOUTH  DAILY 90 Tab 1    simvastatin (ZOCOR) 10 mg tablet TAKE 1 TABLET BY MOUTH  NIGHTLY 90 Tab 1    amLODIPine (NORVASC) 5 mg tablet TAKE 1 TABLET BY MOUTH  DAILY 90 Tab 1    nitroglycerin (NITROSTAT) 0.4 mg SL tablet 1 Tab by SubLINGual route every five (5) minutes as needed for Chest Pain (up to 3 doses). Up to 3 doses. 25 Tab 1    aspirin delayed-release 81 mg tablet Take 1 Tab by mouth daily. 1 Tab 0    multivitamin (ONE A DAY) tablet Take 1 Tab by mouth daily.  nitroglycerin (NITROSTAT) 0.4 mg SL tablet DISSOLVE 1 TABLET UNDER THE TONGUE EVERY 5 MIN AS  NEEDED FOR CHEST PAIN UP TO 3 TABS IN 15 MIN. CALL 911  IF CHEST PAIN PERSISTS 50 Tab 0     Allergies   Allergen Reactions    Amoxicillin Anaphylaxis     History reviewed. No pertinent past medical history. Past Surgical History:   Procedure Laterality Date    CHEST SURGERY PROCEDURE UNLISTED      HX PARTIAL HYSTERECTOMY       Family History   Problem Relation Age of Onset    Hypertension Mother     Diabetes Father      Social History     Tobacco Use    Smoking status: Never Smoker    Smokeless tobacco: Never Used   Substance Use Topics    Alcohol use: No             Objective:   Vital Signs: (As obtained by patient/caregiver at home)  There were no vitals taken for this visit.      [INSTRUCTIONS:  \"[x]\" Indicates a positive item  \"[]\" Indicates a negative item  -- DELETE ALL ITEMS NOT EXAMINED]    Constitutional: [x] Appears well-developed and well-nourished [x] No apparent distress      [] Abnormal -     Mental status: [x] Alert and awake  [x] Oriented to person/place/time [x] Able to follow commands    [] Abnormal -     Eyes:   EOM    [x]  Normal    [] Abnormal -   Sclera  [x]  Normal    [] Abnormal -          Discharge [x]  None visible   [] Abnormal -     HENT: [x] Normocephalic, atraumatic  [] Abnormal -   [] Mouth/Throat: Mucous membranes are moist    External Ears [x] Normal  [] Abnormal -    Neck: [x] No visualized mass [] Abnormal -     Pulmonary/Chest: [x] Respiratory effort normal   [x] No visualized signs of difficulty breathing or respiratory distress        [] Abnormal -      Musculoskeletal:   [x] Normal gait with no signs of ataxia         [x] Normal range of motion of neck        [] Abnormal -     Neurological:        [x] No Facial Asymmetry (Cranial nerve 7 motor function) (limited exam due to video visit)          [x] No gaze palsy        [] Abnormal -          Skin:        [x] No significant exanthematous lesions or discoloration noted on facial skin         [] Abnormal -            Psychiatric:       [x] Normal Affect [] Abnormal -        [x] No Hallucinations    Other pertinent observable physical exam findings:-        We discussed the expected course, resolution and complications of the diagnosis(es) in detail. Medication risks, benefits, costs, interactions, and alternatives were discussed as indicated. I advised her to contact the office if her condition worsens, changes or fails to improve as anticipated. She expressed understanding with the diagnosis(es) and plan. Viona Lundborg is a 60 y.o. female being evaluated by a video visit encounter for concerns as above. A caregiver was present when appropriate. Due to this being a TeleHealth encounter ([facility]), evaluation of the following organ systems was limited: Vitals/Constitutional/EENT/Resp/CV/GI//MS/Neuro/Skin/Heme-Lymph-Imm.   Pursuant to the emergency declaration under the [facility] waiver authority and the [employer] Supplemental Appropriations Act, this Virtual  Visit was conducted, with patient's (and/or legal guardian's) consent, to reduce the patient's risk of exposure to COVID-19 and provide necessary medical care. Services were provided through a video synchronous discussion virtually to substitute for in-person clinic visit. Patient and provider were located at their individual homes.         Kevin Lunsford MD

## 2020-06-09 RX ORDER — HYDROCHLOROTHIAZIDE 25 MG/1
TABLET ORAL
Qty: 90 TAB | Refills: 1 | Status: SHIPPED | OUTPATIENT
Start: 2020-06-09 | End: 2020-11-24

## 2020-06-09 RX ORDER — METFORMIN HYDROCHLORIDE 500 MG/1
TABLET, EXTENDED RELEASE ORAL
Qty: 90 TAB | Refills: 1 | Status: SHIPPED | OUTPATIENT
Start: 2020-06-09 | End: 2020-11-10 | Stop reason: SDUPTHER

## 2020-06-09 RX ORDER — AMLODIPINE BESYLATE 5 MG/1
TABLET ORAL
Qty: 90 TAB | Refills: 1 | Status: SHIPPED | OUTPATIENT
Start: 2020-06-09 | End: 2020-11-24

## 2020-06-09 RX ORDER — SIMVASTATIN 10 MG/1
TABLET, FILM COATED ORAL
Qty: 90 TAB | Refills: 1 | Status: SHIPPED | OUTPATIENT
Start: 2020-06-09 | End: 2020-11-24

## 2020-06-09 RX ORDER — LISINOPRIL 40 MG/1
TABLET ORAL
Qty: 90 TAB | Refills: 1 | Status: SHIPPED | OUTPATIENT
Start: 2020-06-09 | End: 2020-11-24

## 2020-06-15 ENCOUNTER — VIRTUAL VISIT (OUTPATIENT)
Dept: FAMILY MEDICINE CLINIC | Age: 65
End: 2020-06-15

## 2020-06-15 DIAGNOSIS — J01.10 ACUTE NON-RECURRENT FRONTAL SINUSITIS: Primary | ICD-10-CM

## 2020-06-15 NOTE — PROGRESS NOTES
1. Have you been to the ER, urgent care clinic since your last visit? Hospitalized since your last visit? No    2. Have you seen or consulted any other health care providers outside of the [address] [place] [address] since your last visit? Include any pap smears or colon screening. No    Chief Complaint   Patient presents with    Sinus Infection    Sinus Pain     forehead are sore. x2 weeks.  Tried OTC tylenol

## 2020-06-15 NOTE — PROGRESS NOTES
Silvino Lugo is a 60 y.o. female who was seen by synchronous (real-time) audio-video technology on 6/15/2020. Consent: Silvino Lugo, who was seen by synchronous (real-time) audio-video technology, and/or her healthcare decision maker, is aware that this patient-initiated, Telehealth encounter on 6/15/2020 is a billable service, with coverage as determined by her insurance carrier. She is aware that she may receive a bill and has provided verbal consent to proceed: Yes. Assessment & Plan:   Diagnoses and all orders for this visit:    1. Acute non-recurrent frontal sinusitis      Sinusitis- allergic to amoxicillin. Given duration of sx without improvement, will rx z-pack. Encouraged afrin as well for 5 days only. 712  Subjective:   Silvino Lugo is a 60 y.o. female who was seen for Sinus Infection and Sinus Pain (forehead are sore. x2 weeks. Tried OTC tylenol)  sinus pain over forehead for 2 weeks. No help from otc meds. No f/c/ no n/v, no neck pain. Prior to Admission medications    Medication Sig Start Date End Date Taking? Authorizing Provider   hydroCHLOROthiazide (HYDRODIURIL) 25 mg tablet TAKE 1 TABLET BY MOUTH  DAILY 6/9/20  Yes Radha Dill MD   lisinopriL (PRINIVIL, ZESTRIL) 40 mg tablet TAKE 1 TABLET BY MOUTH  DAILY 6/9/20  Yes Radha Dill MD   metFORMIN ER (GLUCOPHAGE XR) 500 mg tablet TAKE 1 TABLET BY MOUTH  DAILY WITH DINNER 6/9/20  Yes Radha Dill MD   amLODIPine (NORVASC) 5 mg tablet TAKE 1 TABLET BY MOUTH  DAILY 6/9/20  Yes Radha Dill MD   simvastatin (ZOCOR) 10 mg tablet TAKE 1 TABLET BY MOUTH  NIGHTLY 6/9/20  Yes Radha Dill MD   metoprolol succinate (TOPROL-XL) 25 mg XL tablet TAKE 1 TABLET EVERY DAY 4/6/20  Yes Radha Dill MD   nitroglycerin (NITROSTAT) 0.4 mg SL tablet DISSOLVE 1 TABLET UNDER THE TONGUE EVERY 5 MIN AS  NEEDED FOR CHEST PAIN UP TO 3 TABS IN 15 MIN.  CALL 911  IF CHEST PAIN PERSISTS 10/9/19  Yes Luba Reynolds MD ELAINA   nitroglycerin (NITROSTAT) 0.4 mg SL tablet 1 Tab by SubLINGual route every five (5) minutes as needed for Chest Pain (up to 3 doses). Up to 3 doses. 9/19/19  Yes Bhupinder Bustos MD   aspirin delayed-release 81 mg tablet Take 1 Tab by mouth daily. 12/30/16  Yes Bhupinder Bustos MD   multivitamin (ONE A DAY) tablet Take 1 Tab by mouth daily. Yes Provider, Historical     Allergies   Allergen Reactions    Amoxicillin Anaphylaxis       Patient Active Problem List   Diagnosis Code    Diastolic dysfunction S81.94    History of hysterectomy Z90.710    Essential hypertension I10    Hyperlipidemia E78.5    Prediabetes R73.03    Baker's cyst of knee M71.20    Severe obesity (BMI 35.0-39. 9) E66.01     Patient Active Problem List    Diagnosis Date Noted    Severe obesity (BMI 35.0-39.9) 08/07/2018    Baker's cyst of knee 05/23/2017    Prediabetes 07/21/2016    Hyperlipidemia 10/05/2015    Essential hypertension 06/04/2015    History of hysterectomy 56/95/7523    Diastolic dysfunction 95/61/0178     Current Outpatient Medications   Medication Sig Dispense Refill    hydroCHLOROthiazide (HYDRODIURIL) 25 mg tablet TAKE 1 TABLET BY MOUTH  DAILY 90 Tab 1    lisinopriL (PRINIVIL, ZESTRIL) 40 mg tablet TAKE 1 TABLET BY MOUTH  DAILY 90 Tab 1    metFORMIN ER (GLUCOPHAGE XR) 500 mg tablet TAKE 1 TABLET BY MOUTH  DAILY WITH DINNER 90 Tab 1    amLODIPine (NORVASC) 5 mg tablet TAKE 1 TABLET BY MOUTH  DAILY 90 Tab 1    simvastatin (ZOCOR) 10 mg tablet TAKE 1 TABLET BY MOUTH  NIGHTLY 90 Tab 1    metoprolol succinate (TOPROL-XL) 25 mg XL tablet TAKE 1 TABLET EVERY DAY 90 Tab 3    nitroglycerin (NITROSTAT) 0.4 mg SL tablet DISSOLVE 1 TABLET UNDER THE TONGUE EVERY 5 MIN AS  NEEDED FOR CHEST PAIN UP TO 3 TABS IN 15 MIN. CALL 911  IF CHEST PAIN PERSISTS 50 Tab 0    nitroglycerin (NITROSTAT) 0.4 mg SL tablet 1 Tab by SubLINGual route every five (5) minutes as needed for Chest Pain (up to 3 doses). Up to 3 doses. 25 Tab 1    aspirin delayed-release 81 mg tablet Take 1 Tab by mouth daily. 1 Tab 0    multivitamin (ONE A DAY) tablet Take 1 Tab by mouth daily. Allergies   Allergen Reactions    Amoxicillin Anaphylaxis     History reviewed. No pertinent past medical history. Past Surgical History:   Procedure Laterality Date    CHEST SURGERY PROCEDURE UNLISTED      HX PARTIAL HYSTERECTOMY       Family History   Problem Relation Age of Onset    Hypertension Mother     Diabetes Father      Social History     Tobacco Use    Smoking status: Never Smoker    Smokeless tobacco: Never Used   Substance Use Topics    Alcohol use: No       ROS  Cardiac- no chest pain or palpitations  Pulmonary- no sob or wheezes  GI- no n/v or diarrhea. Objective: There were no vitals taken for this visit. General: alert, cooperative, no distress   Mental  status: normal mood, behavior, speech, dress, motor activity, and thought processes, able to follow commands   HENT: NCAT   Neck: no visualized mass   Resp: no respiratory distress   Neuro: no gross deficits   Skin: no discoloration or lesions of concern on visible areas   Psychiatric: normal affect, consistent with stated mood, no evidence of hallucinations     Additional exam findings: We discussed the expected course, resolution and complications of the diagnosis(es) in detail. Medication risks, benefits, costs, interactions, and alternatives were discussed as indicated. I advised her to contact the office if her condition worsens, changes or fails to improve as anticipated. She expressed understanding with the diagnosis(es) and plan. Cole Tian is a 60 y.o. female who was evaluated by a video visit encounter for concerns as above. Patient identification was verified prior to start of the visit. A caregiver was present when appropriate.  Due to this being a TeleHealth encounter (During [place] [address] public health emergency), evaluation of the following organ systems was limited: Vitals/Constitutional/EENT/Resp/CV/GI//MS/Neuro/Skin/Heme-Lymph-Imm. Pursuant to the emergency declaration under the [facility] waiver authority and the [employer] Act, this Virtual  Visit was conducted, with patient's (and/or legal guardian's) consent, to reduce the patient's risk of exposure to COVID-19 and provide necessary medical care. Services were provided through a video synchronous discussion virtually to substitute for in-person clinic visit. Patient and provider were located at their individual homes.       Wallace Martin MD

## 2020-06-18 ENCOUNTER — VIRTUAL VISIT (OUTPATIENT)
Dept: FAMILY MEDICINE CLINIC | Age: 65
End: 2020-06-18

## 2020-06-18 DIAGNOSIS — M54.50 ACUTE RIGHT-SIDED LOW BACK PAIN WITHOUT SCIATICA: ICD-10-CM

## 2020-06-18 DIAGNOSIS — R51.9 FRONTAL HEADACHE: Primary | ICD-10-CM

## 2020-06-18 RX ORDER — FLUTICASONE PROPIONATE 50 MCG
SPRAY, SUSPENSION (ML) NASAL
Qty: 1 BOTTLE | Refills: 3 | Status: SHIPPED | OUTPATIENT
Start: 2020-06-18 | End: 2022-07-28

## 2020-06-18 NOTE — PROGRESS NOTES
1. Have you been to the ER, urgent care clinic since your last visit? Hospitalized since your last visit? No    2. Have you seen or consulted any other health care providers outside of the [address] [place] [address] since your last visit? Include any pap smears or colon screening. No      Chief Complaint   Patient presents with    Headache     x3 weeks now    Sweats     No fever     LOW BACK PAIN     left side.

## 2020-06-18 NOTE — PROGRESS NOTES
Miles Em is a 60 y.o. female who was seen by synchronous (real-time) audio-video technology on 6/18/2020. Consent: Miles Em, who was seen by synchronous (real-time) audio-video technology, and/or her healthcare decision maker, is aware that this patient-initiated, Telehealth encounter on 6/18/2020 is a billable service, with coverage as determined by her insurance carrier. She is aware that she may receive a bill and has provided verbal consent to proceed: Yes. Assessment & Plan:   Diagnoses and all orders for this visit:    1. Frontal headache-getting headache comes and goes suspect this is related to sinus pressure and possibly allergies. Asked to use Afrin nasal spray which she states she has at home for up to 5 days and use the Flonase for at least 2 weeks but stopped this when the symptoms get better. She will call back if this does not relieve her symptoms. As well as no results of her COVID  -     fluticasone propionate (FLONASE) 50 mcg/actuation nasal spray; Two sprays each nostril daily. Use for at least two weeks at a time to control symptoms. 2. Acute right-sided low back pain without sciatica-likely muscle strain and overall improvingno longer doing the exercise which caused this she will monitor for improvement and advised use of Naprosyn for 5 days every day to see if this helps. She will let us know if this does not improve          712  Subjective:   Miles Em is a 60 y.o. female who was seen for Headache (x3 weeks now); Sweats (No fever ); and LOW BACK PAIN (left side. )  got covid test through her [facility] today. gettting frontal h/a associated with some focal sweating over her forehead only when the headache occurs no other rashes no fevers or chills or nausea or vomiting. No cough no shortness of breath. No neurologic symptoms or confusion. She seems to be fine in the morning but the headache occurs later in the day. No nasal drainage.   Does have some postnasal drip which is chronic  Also somse low back pain on left side radiates to thigh- associates this with starting day after a new exercise about 2 weeks ago. Some improvement but also comes and goes to some degree    Prior to Admission medications    Medication Sig Start Date End Date Taking? Authorizing Provider   hydroCHLOROthiazide (HYDRODIURIL) 25 mg tablet TAKE 1 TABLET BY MOUTH  DAILY 6/9/20  Yes Rhys Shi MD   lisinopriL (PRINIVIL, ZESTRIL) 40 mg tablet TAKE 1 TABLET BY MOUTH  DAILY 6/9/20  Yes Rhys Shi MD   metFORMIN ER (GLUCOPHAGE XR) 500 mg tablet TAKE 1 TABLET BY MOUTH  DAILY WITH DINNER 6/9/20  Yes Rhys Shi MD   amLODIPine (NORVASC) 5 mg tablet TAKE 1 TABLET BY MOUTH  DAILY 6/9/20  Yes Rhys Shi MD   simvastatin (ZOCOR) 10 mg tablet TAKE 1 TABLET BY MOUTH  NIGHTLY 6/9/20  Yes Rhys Shi MD   metoprolol succinate (TOPROL-XL) 25 mg XL tablet TAKE 1 TABLET EVERY DAY 4/6/20  Yes Rhys Shi MD   nitroglycerin (NITROSTAT) 0.4 mg SL tablet 1 Tab by SubLINGual route every five (5) minutes as needed for Chest Pain (up to 3 doses). Up to 3 doses. 9/19/19  Yes Rhys Shi MD   aspirin delayed-release 81 mg tablet Take 1 Tab by mouth daily. 12/30/16  Yes Rhys Shi MD   multivitamin (ONE A DAY) tablet Take 1 Tab by mouth daily. Yes Provider, Historical   nitroglycerin (NITROSTAT) 0.4 mg SL tablet DISSOLVE 1 TABLET UNDER THE TONGUE EVERY 5 MIN AS  NEEDED FOR CHEST PAIN UP TO 3 TABS IN 15 MIN. CALL 911  IF CHEST PAIN PERSISTS 10/9/19   Rhys Shi MD     Allergies   Allergen Reactions    Amoxicillin Anaphylaxis       Patient Active Problem List   Diagnosis Code    Diastolic dysfunction U02.54    History of hysterectomy Z90.710    Essential hypertension I10    Hyperlipidemia E78.5    Prediabetes R73.03    Baker's cyst of knee M71.20    Severe obesity (BMI 35.0-39. 9) E66.01     Patient Active Problem List    Diagnosis Date Noted  Severe obesity (BMI 35.0-39.9) 08/07/2018    Baker's cyst of knee 05/23/2017    Prediabetes 07/21/2016    Hyperlipidemia 10/05/2015    Essential hypertension 06/04/2015    History of hysterectomy 49/86/7787    Diastolic dysfunction 72/56/2076     Current Outpatient Medications   Medication Sig Dispense Refill    hydroCHLOROthiazide (HYDRODIURIL) 25 mg tablet TAKE 1 TABLET BY MOUTH  DAILY 90 Tab 1    lisinopriL (PRINIVIL, ZESTRIL) 40 mg tablet TAKE 1 TABLET BY MOUTH  DAILY 90 Tab 1    metFORMIN ER (GLUCOPHAGE XR) 500 mg tablet TAKE 1 TABLET BY MOUTH  DAILY WITH DINNER 90 Tab 1    amLODIPine (NORVASC) 5 mg tablet TAKE 1 TABLET BY MOUTH  DAILY 90 Tab 1    simvastatin (ZOCOR) 10 mg tablet TAKE 1 TABLET BY MOUTH  NIGHTLY 90 Tab 1    metoprolol succinate (TOPROL-XL) 25 mg XL tablet TAKE 1 TABLET EVERY DAY 90 Tab 3    nitroglycerin (NITROSTAT) 0.4 mg SL tablet 1 Tab by SubLINGual route every five (5) minutes as needed for Chest Pain (up to 3 doses). Up to 3 doses. 25 Tab 1    aspirin delayed-release 81 mg tablet Take 1 Tab by mouth daily. 1 Tab 0    multivitamin (ONE A DAY) tablet Take 1 Tab by mouth daily.  nitroglycerin (NITROSTAT) 0.4 mg SL tablet DISSOLVE 1 TABLET UNDER THE TONGUE EVERY 5 MIN AS  NEEDED FOR CHEST PAIN UP TO 3 TABS IN 15 MIN. CALL 911  IF CHEST PAIN PERSISTS 50 Tab 0     Allergies   Allergen Reactions    Amoxicillin Anaphylaxis     History reviewed. No pertinent past medical history. Past Surgical History:   Procedure Laterality Date    CHEST SURGERY PROCEDURE UNLISTED      HX PARTIAL HYSTERECTOMY       Family History   Problem Relation Age of Onset    Hypertension Mother     Diabetes Father      Social History     Tobacco Use    Smoking status: Never Smoker    Smokeless tobacco: Never Used   Substance Use Topics    Alcohol use: No       ROS      Objective: There were no vitals taken for this visit.    General: alert, cooperative, no distress   Mental  status: normal mood, behavior, speech, dress, motor activity, and thought processes, able to follow commands   HENT: NCAT   Neck: no visualized mass   Resp: no respiratory distress   Neuro: no gross deficits   Skin: no discoloration or lesions of concern on visible areas   Psychiatric: normal affect, consistent with stated mood, no evidence of hallucinations     Additional exam findings: We discussed the expected course, resolution and complications of the diagnosis(es) in detail. Medication risks, benefits, costs, interactions, and alternatives were discussed as indicated. I advised her to contact the office if her condition worsens, changes or fails to improve as anticipated. She expressed understanding with the diagnosis(es) and plan. Zara Clayton is a 60 y.o. female who was evaluated by a video visit encounter for concerns as above. Patient identification was verified prior to start of the visit. A caregiver was present when appropriate. Due to this being a TeleHealth encounter (During [facility]-05 public health emergency), evaluation of the following organ systems was limited: Vitals/Constitutional/EENT/Resp/CV/GI//MS/Neuro/Skin/Heme-Lymph-Imm. Pursuant to the emergency declaration under the [facility], [facility] waiver authority and the [employer] and [employer] Act, this Virtual  Visit was conducted, with patient's (and/or legal guardian's) consent, to reduce the patient's risk of exposure to COVID-19 and provide necessary medical care. Services were provided through a video synchronous discussion virtually to substitute for in-person clinic visit. Patient and provider were located at their individual homes.       Liz Carlos MD

## 2020-07-14 ENCOUNTER — VIRTUAL VISIT (OUTPATIENT)
Dept: FAMILY MEDICINE CLINIC | Age: 65
End: 2020-07-14

## 2020-07-14 DIAGNOSIS — M54.32 SCIATICA OF LEFT SIDE: ICD-10-CM

## 2020-07-14 DIAGNOSIS — M46.1 SACROILIITIS (HCC): Primary | ICD-10-CM

## 2020-07-14 RX ORDER — ETODOLAC 400 MG/1
400 TABLET, FILM COATED ORAL DAILY
COMMUNITY
Start: 2020-06-26 | End: 2021-05-27

## 2020-07-14 RX ORDER — METHOCARBAMOL 750 MG/1
750 TABLET, FILM COATED ORAL AS NEEDED
COMMUNITY
Start: 2020-06-26 | End: 2021-02-24

## 2020-07-14 RX ORDER — HYDROCODONE BITARTRATE AND ACETAMINOPHEN 5; 325 MG/1; MG/1
1 TABLET ORAL
COMMUNITY
Start: 2020-06-24 | End: 2021-05-27

## 2020-07-14 RX ORDER — METHYLPREDNISOLONE 4 MG/1
TABLET ORAL
COMMUNITY
Start: 2020-06-24 | End: 2020-11-10 | Stop reason: ALTCHOICE

## 2020-07-14 RX ORDER — FAMOTIDINE 20 MG/1
20 TABLET, FILM COATED ORAL EVERY 12 HOURS
COMMUNITY
Start: 2020-07-02 | End: 2021-02-24 | Stop reason: SDUPTHER

## 2020-07-14 NOTE — PROGRESS NOTES
Yo Fletcher is a 60 y.o. female who was seen by synchronous (real-time) audio-video technology on 7/14/2020 for ED Follow-up (went to [facility] ED on 7/2/2020 for chest pain and low back pain. On Methocarbamol, Norco, Lodine and Medrol Dose pack. ) and LOW BACK PAIN (Left isde. Pain Score 8. )        Assessment & Plan:   Diagnoses and all orders for this visit:    1. Sacroiliitis (HCC)-discussed potential treatments for this and sciatica patient wishes to try physical therapy at this point if continues will need to explore further imaging and/or referral discussed. She will call us if not improving.  -     REFERRAL TO PHYSICAL THERAPY    2. Sciatica of left side  -     REFERRAL TO PHYSICAL THERAPY      Follow-up as needed or as scheduled    712  Subjective:   Having left sided low back pain and radiation down the leg. Diagnosed with sacroiliitis and sciatica of the left side at [facility] and the emergency room given steroids and muscle relaxer as well as NSAID and Norcothis has reduced pain but she still has some residual symptoms left. No fevers or chills no incontinence no paresthesias or difficulty ambulating. Prior to Admission medications    Medication Sig Start Date End Date Taking? Authorizing Provider   famotidine (PEPCID) 20 mg tablet Take 20 mg by mouth every twelve (12) hours. 7/2/20  Yes Provider, Historical   HYDROcodone-acetaminophen (NORCO) 5-325 mg per tablet Take 1 Tab by mouth every eight (8) hours as needed. 6/24/20  Yes Provider, Historical   methocarbamoL (ROBAXIN) 750 mg tablet Take 750 mg by mouth as needed. 6/26/20  Yes Provider, Historical   methylPREDNISolone (MEDROL DOSEPACK) 4 mg tablet  6/24/20  Yes Provider, Historical   etodolac (LODINE) 400 mg tablet Take 400 mg by mouth daily. 6/26/20  Yes Provider, Historical   fluticasone propionate (FLONASE) 50 mcg/actuation nasal spray Two sprays each nostril daily. Use for at least two weeks at a time to control symptoms.  6/18/20  Yes Margarette Rouse MD   hydroCHLOROthiazide (HYDRODIURIL) 25 mg tablet TAKE 1 TABLET BY MOUTH  DAILY 6/9/20  Yes Margarette Rouse MD   lisinopriL (PRINIVIL, ZESTRIL) 40 mg tablet TAKE 1 TABLET BY MOUTH  DAILY 6/9/20  Yes Margarette Rouse MD   metFORMIN ER (GLUCOPHAGE XR) 500 mg tablet TAKE 1 TABLET BY MOUTH  DAILY WITH DINNER 6/9/20  Yes Margarette Rouse MD   amLODIPine (NORVASC) 5 mg tablet TAKE 1 TABLET BY MOUTH  DAILY 6/9/20  Yes Mragarette Rouse MD   simvastatin (ZOCOR) 10 mg tablet TAKE 1 TABLET BY MOUTH  NIGHTLY 6/9/20  Yes Margarette Rouse MD   metoprolol succinate (TOPROL-XL) 25 mg XL tablet TAKE 1 TABLET EVERY DAY 4/6/20  Yes Margarette Rouse MD   nitroglycerin (NITROSTAT) 0.4 mg SL tablet DISSOLVE 1 TABLET UNDER THE TONGUE EVERY 5 MIN AS  NEEDED FOR CHEST PAIN UP TO 3 TABS IN 15 MIN. CALL 911  IF CHEST PAIN PERSISTS 10/9/19  Yes Margarette Rouse MD   nitroglycerin (NITROSTAT) 0.4 mg SL tablet 1 Tab by SubLINGual route every five (5) minutes as needed for Chest Pain (up to 3 doses). Up to 3 doses. 9/19/19  Yes Margarette Rouse MD   aspirin delayed-release 81 mg tablet Take 1 Tab by mouth daily. 12/30/16  Yes Margarette Rouse MD   multivitamin (ONE A DAY) tablet Take 1 Tab by mouth daily. Yes Provider, Historical     Patient Active Problem List   Diagnosis Code    Diastolic dysfunction L63.51    History of hysterectomy Z90.710    Essential hypertension I10    Hyperlipidemia E78.5    Prediabetes R73.03    Baker's cyst of knee M71.20    Severe obesity (BMI 35.0-39. 9) E66.01     Patient Active Problem List    Diagnosis Date Noted    Severe obesity (BMI 35.0-39.9) 08/07/2018    Baker's cyst of knee 05/23/2017    Prediabetes 07/21/2016    Hyperlipidemia 10/05/2015    Essential hypertension 06/04/2015    History of hysterectomy 16/79/0843    Diastolic dysfunction 79/17/2692     Current Outpatient Medications   Medication Sig Dispense Refill    famotidine (PEPCID) 20 mg tablet Take 20 mg by mouth every twelve (12) hours.  HYDROcodone-acetaminophen (NORCO) 5-325 mg per tablet Take 1 Tab by mouth every eight (8) hours as needed.  methocarbamoL (ROBAXIN) 750 mg tablet Take 750 mg by mouth as needed.  methylPREDNISolone (MEDROL DOSEPACK) 4 mg tablet       etodolac (LODINE) 400 mg tablet Take 400 mg by mouth daily.  fluticasone propionate (FLONASE) 50 mcg/actuation nasal spray Two sprays each nostril daily. Use for at least two weeks at a time to control symptoms. 1 Bottle 3    hydroCHLOROthiazide (HYDRODIURIL) 25 mg tablet TAKE 1 TABLET BY MOUTH  DAILY 90 Tab 1    lisinopriL (PRINIVIL, ZESTRIL) 40 mg tablet TAKE 1 TABLET BY MOUTH  DAILY 90 Tab 1    metFORMIN ER (GLUCOPHAGE XR) 500 mg tablet TAKE 1 TABLET BY MOUTH  DAILY WITH DINNER 90 Tab 1    amLODIPine (NORVASC) 5 mg tablet TAKE 1 TABLET BY MOUTH  DAILY 90 Tab 1    simvastatin (ZOCOR) 10 mg tablet TAKE 1 TABLET BY MOUTH  NIGHTLY 90 Tab 1    metoprolol succinate (TOPROL-XL) 25 mg XL tablet TAKE 1 TABLET EVERY DAY 90 Tab 3    nitroglycerin (NITROSTAT) 0.4 mg SL tablet DISSOLVE 1 TABLET UNDER THE TONGUE EVERY 5 MIN AS  NEEDED FOR CHEST PAIN UP TO 3 TABS IN 15 MIN. CALL 911  IF CHEST PAIN PERSISTS 50 Tab 0    nitroglycerin (NITROSTAT) 0.4 mg SL tablet 1 Tab by SubLINGual route every five (5) minutes as needed for Chest Pain (up to 3 doses). Up to 3 doses. 25 Tab 1    aspirin delayed-release 81 mg tablet Take 1 Tab by mouth daily. 1 Tab 0    multivitamin (ONE A DAY) tablet Take 1 Tab by mouth daily. Allergies   Allergen Reactions    Amoxicillin Anaphylaxis     History reviewed. No pertinent past medical history.   Past Surgical History:   Procedure Laterality Date    CHEST SURGERY PROCEDURE UNLISTED      HX PARTIAL HYSTERECTOMY       Family History   Problem Relation Age of Onset    Hypertension Mother     Diabetes Father      Social History     Tobacco Use    Smoking status: Never Smoker    Smokeless tobacco: Never Used   Substance Use Topics    Alcohol use: No       ROS  Cardiac- no chest pain or palpitations  Pulmonary- no sob or wheezes  GI- no n/v or diarrhea. Objective:   No flowsheet data found. General: alert, cooperative, no distress   Mental  status: normal mood, behavior, speech, dress, motor activity, and thought processes, able to follow commands   HENT: NCAT   Neck: no visualized mass   Resp: no respiratory distress   Neuro: no gross deficits   Skin: no discoloration or lesions of concern on visible areas   Psychiatric: normal affect, consistent with stated mood, no evidence of hallucinations     Additional exam findings: We discussed the expected course, resolution and complications of the diagnosis(es) in detail. Medication risks, benefits, costs, interactions, and alternatives were discussed as indicated. I advised her to contact the office if her condition worsens, changes or fails to improve as anticipated. She expressed understanding with the diagnosis(es) and plan. Emleyn Lainez, who was evaluated through a patient-initiated, synchronous (real-time) audio-video encounter, and/or her healthcare decision maker, is aware that it is a billable service, with coverage as determined by her insurance carrier. She provided verbal consent to proceed: Yes, and patient identification was verified. It was conducted pursuant to the emergency declaration under the [address] [place] [address] authority and the [employer] Act. A caregiver was present when appropriate. Ability to conduct physical exam was limited. I was at home. The patient was at home.       Georgia Ward MD

## 2020-07-14 NOTE — PROGRESS NOTES
1. Have you been to the ER, urgent care clinic since your last visit? Hospitalized since your last visit? Yes, went to [facility] on 7/2/2020 for chest pain and radiated down to her right leg. 2. Have you seen or consulted any other health care providers outside of the [address] [place] [address] since your last visit? Include any pap smears or colon screening. No.    Chief Complaint   Patient presents with    ED Follow-up     went to [facility] ED on 7/2/2020 for chest pain and low back pain. On Methocarbamol, norco,     LOW BACK PAIN     Left isde. Pain Score 8.

## 2020-07-14 NOTE — LETTER
NOTIFICATION RETURN TO WORK / SCHOOL 
 
7/14/2020 3:49 PM 
 
Ms. Amarilys Mckinney [address] [id number] To Whom It May Concern: 
 
Amarilys Hilton is currently under the care of Jovanny Schofield. She will need an evaluation by physical therapy and then three times per week treatement for 4-6 weeks or as recommended by the physical therapist. Please allow her the time to attend these sessions. She is expected to make a full recovery. If there are questions or concerns please have the patient contact our office.  
 
 
 
Sincerely, 
 
 
Katerin Sood MD

## 2020-11-10 ENCOUNTER — VIRTUAL VISIT (OUTPATIENT)
Dept: FAMILY MEDICINE CLINIC | Age: 65
End: 2020-11-10
Payer: COMMERCIAL

## 2020-11-10 DIAGNOSIS — I10 ESSENTIAL HYPERTENSION: Primary | ICD-10-CM

## 2020-11-10 DIAGNOSIS — E78.2 MIXED HYPERLIPIDEMIA: ICD-10-CM

## 2020-11-10 DIAGNOSIS — R73.03 PREDIABETES: ICD-10-CM

## 2020-11-10 DIAGNOSIS — N30.00 ACUTE CYSTITIS WITHOUT HEMATURIA: ICD-10-CM

## 2020-11-10 DIAGNOSIS — E66.9 OBESITY (BMI 30-39.9): ICD-10-CM

## 2020-11-10 PROCEDURE — 99214 OFFICE O/P EST MOD 30 MIN: CPT | Performed by: INTERNAL MEDICINE

## 2020-11-10 RX ORDER — NITROFURANTOIN 25; 75 MG/1; MG/1
100 CAPSULE ORAL 2 TIMES DAILY
Qty: 10 CAP | Refills: 0 | Status: SHIPPED | OUTPATIENT
Start: 2020-11-10 | End: 2020-11-15

## 2020-11-10 RX ORDER — METFORMIN HYDROCHLORIDE 500 MG/1
TABLET, EXTENDED RELEASE ORAL
Qty: 90 TAB | Refills: 1 | Status: SHIPPED | OUTPATIENT
Start: 2020-11-10 | End: 2021-05-27

## 2020-11-10 NOTE — PROGRESS NOTES
Caroline Rosas is a 60 y.o. female who was seen by synchronous (real-time) audio-video technology on 11/10/2020 for Follow Up Chronic Condition; Hypertension; and Urgency (urinating and pressure. x1 week. )        Assessment & Plan:   Diagnoses and all orders for this visit:    1. Essential hypertension-discussed silent aspect of hypertension and importance of occasional blood pressure checks at home so will send home blood pressure monitor order to her to have filled. She is not ready to come into the office to get lab work done or have this checked due to the pandemic so she was encouraged to come in 4 months hopefully after the vaccine has been released. -     AMB SUPPLY ORDER    2. Mixed hyperlipidemia-continue statin    3. Prediabetes-we will refill Metformin    4. Obesity (BMI 30-39. 9)- encouraged wt loss. 5. Acute cystitis without hematuria- will rx abx and pt will monitor for improvement. Other orders  -     nitrofurantoin, macrocrystal-monohydrate, (Macrobid) 100 mg capsule; Take 1 Cap by mouth two (2) times a day for 5 days. -     metFORMIN ER (GLUCOPHAGE XR) 500 mg tablet; One PO daily    4 months f/u     712  Subjective:       Having urgency and possibly some frequency for the past week or so. No fevers or chills no back pain, no dysuria, no hematuria. Similar to previous urinary collection symptoms. Prediabetes no polyuria or polydipsia. Obesitynot weighing herself regularly but feels she has not gained any weight. Hyperlipidemiataking statintries to watch diet. Hypertensiontries to watch sodium intake no chest pain no paresthesias. Prior to Admission medications    Medication Sig Start Date End Date Taking? Authorizing Provider   fluticasone propionate (FLONASE) 50 mcg/actuation nasal spray Two sprays each nostril daily. Use for at least two weeks at a time to control symptoms.  6/18/20  Yes Ludwig Chase MD   hydroCHLOROthiazide (HYDRODIURIL) 25 mg tablet TAKE 1 TABLET BY MOUTH  DAILY 6/9/20  Yes Aayush Johns MD   lisinopriL (PRINIVIL, ZESTRIL) 40 mg tablet TAKE 1 TABLET BY MOUTH  DAILY 6/9/20  Yes Aayush Johns MD   metFORMIN ER (GLUCOPHAGE XR) 500 mg tablet TAKE 1 TABLET BY MOUTH  DAILY WITH DINNER 6/9/20  Yes Aayush Johns MD   amLODIPine (NORVASC) 5 mg tablet TAKE 1 TABLET BY MOUTH  DAILY 6/9/20  Yes Aayush Johns MD   simvastatin (ZOCOR) 10 mg tablet TAKE 1 TABLET BY MOUTH  NIGHTLY 6/9/20  Yes Aayush Johns MD   metoprolol succinate (TOPROL-XL) 25 mg XL tablet TAKE 1 TABLET EVERY DAY 4/6/20  Yes Aayush Johns MD   nitroglycerin (NITROSTAT) 0.4 mg SL tablet DISSOLVE 1 TABLET UNDER THE TONGUE EVERY 5 MIN AS  NEEDED FOR CHEST PAIN UP TO 3 TABS IN 15 MIN. CALL 911  IF CHEST PAIN PERSISTS 10/9/19  Yes Aayush Johns MD   aspirin delayed-release 81 mg tablet Take 1 Tab by mouth daily. 12/30/16  Yes Aayush Johns MD   multivitamin (ONE A DAY) tablet Take 1 Tab by mouth daily. Yes Provider, Historical   famotidine (PEPCID) 20 mg tablet Take 20 mg by mouth every twelve (12) hours. 7/2/20   Provider, Historical   HYDROcodone-acetaminophen (NORCO) 5-325 mg per tablet Take 1 Tab by mouth every eight (8) hours as needed. 6/24/20   Provider, Historical   methocarbamoL (ROBAXIN) 750 mg tablet Take 750 mg by mouth as needed. 6/26/20   Provider, Historical   etodolac (LODINE) 400 mg tablet Take 400 mg by mouth daily. 6/26/20   Provider, Historical   methylPREDNISolone (MEDROL DOSEPACK) 4 mg tablet  6/24/20 11/10/20  Provider, Historical   nitroglycerin (NITROSTAT) 0.4 mg SL tablet 1 Tab by SubLINGual route every five (5) minutes as needed for Chest Pain (up to 3 doses). Up to 3 doses.  9/19/19   Aayush Johns MD     Patient Active Problem List   Diagnosis Code    Diastolic dysfunction W30.94    History of hysterectomy Z90.710    Essential hypertension I10    Hyperlipidemia E78.5    Prediabetes R73.03    Baker's cyst of knee M71.20    Severe obesity (BMI 35.0-39. 9) E66.01     Patient Active Problem List    Diagnosis Date Noted    Severe obesity (BMI 35.0-39.9) 08/07/2018    Baker's cyst of knee 05/23/2017    Prediabetes 07/21/2016    Hyperlipidemia 10/05/2015    Essential hypertension 06/04/2015    History of hysterectomy 15/19/6842    Diastolic dysfunction 44/73/4031     Current Outpatient Medications   Medication Sig Dispense Refill    fluticasone propionate (FLONASE) 50 mcg/actuation nasal spray Two sprays each nostril daily. Use for at least two weeks at a time to control symptoms. 1 Bottle 3    hydroCHLOROthiazide (HYDRODIURIL) 25 mg tablet TAKE 1 TABLET BY MOUTH  DAILY 90 Tab 1    lisinopriL (PRINIVIL, ZESTRIL) 40 mg tablet TAKE 1 TABLET BY MOUTH  DAILY 90 Tab 1    metFORMIN ER (GLUCOPHAGE XR) 500 mg tablet TAKE 1 TABLET BY MOUTH  DAILY WITH DINNER 90 Tab 1    amLODIPine (NORVASC) 5 mg tablet TAKE 1 TABLET BY MOUTH  DAILY 90 Tab 1    simvastatin (ZOCOR) 10 mg tablet TAKE 1 TABLET BY MOUTH  NIGHTLY 90 Tab 1    metoprolol succinate (TOPROL-XL) 25 mg XL tablet TAKE 1 TABLET EVERY DAY 90 Tab 3    nitroglycerin (NITROSTAT) 0.4 mg SL tablet DISSOLVE 1 TABLET UNDER THE TONGUE EVERY 5 MIN AS  NEEDED FOR CHEST PAIN UP TO 3 TABS IN 15 MIN. CALL 911  IF CHEST PAIN PERSISTS 50 Tab 0    aspirin delayed-release 81 mg tablet Take 1 Tab by mouth daily. 1 Tab 0    multivitamin (ONE A DAY) tablet Take 1 Tab by mouth daily.  famotidine (PEPCID) 20 mg tablet Take 20 mg by mouth every twelve (12) hours.  HYDROcodone-acetaminophen (NORCO) 5-325 mg per tablet Take 1 Tab by mouth every eight (8) hours as needed.  methocarbamoL (ROBAXIN) 750 mg tablet Take 750 mg by mouth as needed.  etodolac (LODINE) 400 mg tablet Take 400 mg by mouth daily.  nitroglycerin (NITROSTAT) 0.4 mg SL tablet 1 Tab by SubLINGual route every five (5) minutes as needed for Chest Pain (up to 3 doses). Up to 3 doses.  25 Tab 1     Allergies   Allergen Reactions    Amoxicillin Anaphylaxis     History reviewed. No pertinent past medical history. Past Surgical History:   Procedure Laterality Date    CHEST SURGERY PROCEDURE UNLISTED      HX PARTIAL HYSTERECTOMY       Family History   Problem Relation Age of Onset    Hypertension Mother     Diabetes Father      Social History     Tobacco Use    Smoking status: Never Smoker    Smokeless tobacco: Never Used   Substance Use Topics    Alcohol use: No       ROS  Cardiac- no chest pain or palpitations  Pulmonary- no sob or wheezes  GI- no n/v or diarrhea. Objective:   No flowsheet data found. General: alert, cooperative, no distress   Mental  status: normal mood, behavior, speech, dress, motor activity, and thought processes, able to follow commands   HENT: NCAT   Neck: no visualized mass   Resp: no respiratory distress   Neuro: no gross deficits   Skin: no discoloration or lesions of concern on visible areas   Psychiatric: normal affect, consistent with stated mood, no evidence of hallucinations     Additional exam findings: We discussed the expected course, resolution and complications of the diagnosis(es) in detail. Medication risks, benefits, costs, interactions, and alternatives were discussed as indicated. I advised her to contact the office if her condition worsens, changes or fails to improve as anticipated. She expressed understanding with the diagnosis(es) and plan. Jarad Blue, who was evaluated through a patient-initiated, synchronous (real-time) audio-video encounter, and/or her healthcare decision maker, is aware that it is a billable service, with coverage as determined by her insurance carrier. She provided verbal consent to proceed: Yes, and patient identification was verified.  It was conducted pursuant to the emergency declaration under the [facility]5 waiver authority and the [employer] and [employer] Appropriations Act. A caregiver was present when appropriate. Ability to conduct physical exam was limited. I was at home. The patient was at home.       Nikole Bowers MD

## 2020-11-10 NOTE — PROGRESS NOTES
1. Have you been to the ER, urgent care clinic since your last visit? Hospitalized since your last visit? No    2. Have you seen or consulted any other health care providers outside of the [address] [place] [address] since your last visit? Include any pap smears or colon screening. No    Chief Complaint   Patient presents with    Follow Up Chronic Condition    Hypertension    Urgency     urinating and pressure. x1 week.

## 2020-11-24 RX ORDER — AMLODIPINE BESYLATE 5 MG/1
TABLET ORAL
Qty: 90 TAB | Refills: 3 | Status: SHIPPED | OUTPATIENT
Start: 2020-11-24 | End: 2021-05-27 | Stop reason: SDUPTHER

## 2020-11-24 RX ORDER — SIMVASTATIN 10 MG/1
TABLET, FILM COATED ORAL
Qty: 90 TAB | Refills: 3 | Status: SHIPPED | OUTPATIENT
Start: 2020-11-24 | End: 2021-05-27 | Stop reason: SDUPTHER

## 2020-11-24 RX ORDER — HYDROCHLOROTHIAZIDE 25 MG/1
TABLET ORAL
Qty: 90 TAB | Refills: 3 | Status: SHIPPED | OUTPATIENT
Start: 2020-11-24 | End: 2021-05-27 | Stop reason: SDUPTHER

## 2020-11-24 RX ORDER — LISINOPRIL 40 MG/1
TABLET ORAL
Qty: 90 TAB | Refills: 3 | Status: SHIPPED | OUTPATIENT
Start: 2020-11-24 | End: 2021-05-27 | Stop reason: SDUPTHER

## 2021-02-24 ENCOUNTER — VIRTUAL VISIT (OUTPATIENT)
Dept: FAMILY MEDICINE CLINIC | Age: 66
End: 2021-02-24
Payer: COMMERCIAL

## 2021-02-24 DIAGNOSIS — K21.9 GASTROESOPHAGEAL REFLUX DISEASE, UNSPECIFIED WHETHER ESOPHAGITIS PRESENT: ICD-10-CM

## 2021-02-24 DIAGNOSIS — Z76.89 ENCOUNTER TO ESTABLISH CARE: ICD-10-CM

## 2021-02-24 DIAGNOSIS — E11.9 CONTROLLED TYPE 2 DIABETES MELLITUS WITHOUT COMPLICATION, WITHOUT LONG-TERM CURRENT USE OF INSULIN (HCC): Primary | ICD-10-CM

## 2021-02-24 DIAGNOSIS — E78.2 MIXED HYPERLIPIDEMIA: ICD-10-CM

## 2021-02-24 DIAGNOSIS — M46.1 SACROILIITIS (HCC): ICD-10-CM

## 2021-02-24 DIAGNOSIS — M54.32 SCIATICA OF LEFT SIDE: ICD-10-CM

## 2021-02-24 DIAGNOSIS — I10 ESSENTIAL HYPERTENSION: ICD-10-CM

## 2021-02-24 PROCEDURE — 99214 OFFICE O/P EST MOD 30 MIN: CPT | Performed by: NURSE PRACTITIONER

## 2021-02-24 RX ORDER — FAMOTIDINE 20 MG/1
20 TABLET, FILM COATED ORAL EVERY 12 HOURS
Qty: 180 TAB | Refills: 0 | Status: SHIPPED | OUTPATIENT
Start: 2021-02-24 | End: 2021-02-26 | Stop reason: SDUPTHER

## 2021-02-24 NOTE — PROGRESS NOTES
Called- pt unable to do pre-check in. Will call at later time. Tried calling pt [phone]- no reply     Chief Complaint   Patient presents with    Back Pain     sciatic nerve pain - left side       1. Have you been to the ER, urgent care clinic since your last visit? Hospitalized since your last visit? No    2. Have you seen or consulted any other health care providers outside of the [address] [place] [address] since your last visit? Include any pap smears or colon screening.  No

## 2021-02-24 NOTE — PROGRESS NOTES
Elvie Garcia. [address]      Gage Laughlin is a 63 y.o. female who was seen by synchronous (real-time) audio-video technology on 2/24/2021. Consent: Gage Laughlin, who was seen by synchronous (real-time) audio-video technology, and/or her healthcare decision maker, is aware that this patient-initiated, Telehealth encounter on 2/24/2021 is a billable service, with coverage as determined by her insurance carrier. She is aware that she may receive a bill and has provided verbal consent to proceed: Yes. Assessment & Plan:   Diagnoses and all orders for this visit:    1. Controlled type 2 diabetes mellitus without complication, without long-term current use of insulin ([address]  Endorses medication compliance  Unable to check glucose at home  Denies s/s of hyper or hypoglycemia  Will have her come in for labs    2. Essential hypertension  Endorses medication compliance  Unable to check blood pressure at home  Will have her come in for blood pressure check    3. Mixed hyperlipidemia  Endorses medication compliance  Denies s/s of myalgia or abd. Pain  Encouraged a low fat diet  Will have her come in for labs    4. Gastroesophageal reflux disease, unspecified whether esophagitis present  -     famotidine (PEPCID) 20 mg tablet; Take 1 Tab by mouth every twelve (12) hours. Refill provided     5. Sacroiliitis ([address]   will have her come in for x-ray    6. Sciatica of left side     7. Encounter to establish care  Visit to establish care, prior patient of Dr. Gurjit Cabral and Dispositions    · Return for ASAP, Labs, nurse visit, b/p check, X-RAY left hip/pelvis, AND, 3 months, DM, HLD, HTN, VV, 15 min.              712  Subjective:   Gage Laughlin is a 63 y.o. female who was seen for   Back Pain (sciatic nerve pain - left side)    DMII-     Patient reports medication compliance Daily  Diabetic diet compliance most of the time  Patient monitors blood sugars regularly does not have a glucometer   Reports am fasting sugars range does not check   Denies hypoglycemic episodes yes  Denies polyuria, polydipsia, paraesthesia, vision changes? yes     Diabetic Foot and Eye Exam HM Status   Topic Date Due    Diabetic Foot Care  02/25/2016    Eye Exam  10/07/2016     Hemoglobin A1c   Date Value Ref Range Status   03/14/2019 6.1 (H) 4.8 - 5.6 % Final     Comment:              Prediabetes: 5.7 - 6.4           Diabetes: >6.4           Glycemic control for adults with diabetes: <7.0     ]  Creatinine, urine   Date Value Ref Range Status   03/14/2019 143.2 Not Estab. mg/dL Final     Microalb/Creat ratio (ug/mg creat.)   Date Value Ref Range Status   03/14/2019 4.2 0.0 - 30.0 mg/g creat Final     Comment:                          Normal:                0.0 -  30.0                       Albuminuria:          31.0 - 300.0                       Clinical albuminuria:       >300.0     05/23/2017 5.6 0.0 - 30.0 mg/g creat Final   09/26/2014 3.6 0.0 - 30.0 mg/g creat Final     Comment:     Performed At: [address]  [id number]  Kareem Severino MD  [id number]     Key Antihyperglycemic Medications             metFORMIN ER (GLUCOPHAGE XR) 500 mg tablet (Taking) One PO daily        Hypertension:   Patient reports taking medications as instructed. yes   Medication side effects noted. no  Headache upon wakening. no   Home BP monitoring in range of has not checked. Do you experience chest pain/pressure or SOB with exertion? no  Maintain a low salt diet?  yes  Key CAD CHF Meds             simvastatin (ZOCOR) 10 mg tablet (Taking) TAKE 1 TABLET BY MOUTH AT  NIGHT    lisinopriL (PRINIVIL, ZESTRIL) 40 mg tablet (Taking) TAKE 1 TABLET BY MOUTH  DAILY    hydroCHLOROthiazide (HYDRODIURIL) 25 mg tablet (Taking) TAKE 1 TABLET BY MOUTH  DAILY    amLODIPine (NORVASC) 5 mg tablet (Taking) TAKE 1 TABLET BY MOUTH  DAILY    metoprolol succinate (TOPROL-XL) 25 mg XL tablet (Taking) TAKE 1 TABLET EVERY DAY    nitroglycerin (NITROSTAT) 0.4 mg SL tablet (Taking) DISSOLVE 1 TABLET UNDER THE TONGUE EVERY 5 MIN AS  NEEDED FOR CHEST PAIN UP TO 3 TABS IN 15 MIN. CALL 911  IF CHEST PAIN PERSISTS    nitroglycerin (NITROSTAT) 0.4 mg SL tablet (Taking) 1 Tab by SubLINGual route every five (5) minutes as needed for Chest Pain (up to 3 doses). Up to 3 doses. aspirin delayed-release 81 mg tablet (Taking) Take 1 Tab by mouth daily. HLD:  Has been compliant with meds  all of the time  Compliant with low-fat diet. most of the time    Denies myalgias or other side effects.  yes  Cholesterol, total   Date Value Ref Range Status   03/14/2019 114 100 - 199 mg/dL Final     Triglyceride   Date Value Ref Range Status   03/14/2019 107 0 - 149 mg/dL Final     HDL Cholesterol   Date Value Ref Range Status   03/14/2019 53 >39 mg/dL Final     LDL, calculated   Date Value Ref Range Status   03/14/2019 40 0 - 99 mg/dL Final   ]  Key Antihyperlipidemia Meds             simvastatin (ZOCOR) 10 mg tablet (Taking) TAKE 1 TABLET BY MOUTH AT  NIGHT           Sciatica  Last visit for this was 7/2020  She did not go to PT, due to cost and pandemic  The pain is in the left lower back and radiates to the bottom of her stomach  The problems happens about every other day  Has been taking aleve for the pain, this helps the pain  Realizes the problem is 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 Lana Grubbs about this in the past  Was possible prescribed pepcid 20mg bid  States it did help      Leg cramps  Onset: couple of years  She attributes it to the metformin  Cramps happen when sleeping and lying still, the cramp is like a veronica horse cramp  Walking around and massaging her leg makes it better  Recommend leg stretches in the evening      Prior to Admission medications    Medication Sig Start Date End Date Taking? Authorizing Provider   famotidine (PEPCID) 20 mg tablet Take 1 Tab by mouth every twelve (12) hours. 2/24/21  Yes Pedro Luis Loyd NP   simvastatin (ZOCOR) 10 mg tablet TAKE 1 TABLET BY MOUTH AT  NIGHT 11/24/20  Yes Edwin Hearn MD   lisinopriL (PRINIVIL, ZESTRIL) 40 mg tablet TAKE 1 TABLET BY MOUTH  DAILY 11/24/20  Yes Edwin Hearn MD   hydroCHLOROthiazide (HYDRODIURIL) 25 mg tablet TAKE 1 TABLET BY MOUTH  DAILY 11/24/20  Yes Edwin Hearn MD   amLODIPine (NORVASC) 5 mg tablet TAKE 1 TABLET BY MOUTH  DAILY 11/24/20  Yes Edwin Hearn MD   metFORMIN ER (GLUCOPHAGE XR) 500 mg tablet One PO daily 11/10/20  Yes Edwin Hearn MD   HYDROcodone-acetaminophen [facility]) 5-325 mg per tablet Take 1 Tab by mouth every eight (8) hours as needed. 6/24/20  Yes Provider, Historical   etodolac (LODINE) 400 mg tablet Take 400 mg by mouth daily. 6/26/20  Yes Provider, Historical   fluticasone propionate (FLONASE) 50 mcg/actuation nasal spray Two sprays each nostril daily. Use for at least two weeks at a time to control symptoms. 6/18/20  Yes Edwin Hearn MD   metoprolol succinate (TOPROL-XL) 25 mg XL tablet TAKE 1 TABLET EVERY DAY 4/6/20  Yes Edwin Hearn MD   nitroglycerin (NITROSTAT) 0.4 mg SL tablet DISSOLVE 1 TABLET UNDER THE TONGUE EVERY 5 MIN AS  NEEDED FOR CHEST PAIN UP TO 3 TABS IN 15 MIN. CALL 911  IF CHEST PAIN PERSISTS 10/9/19  Yes Edwin Hearn MD   nitroglycerin (NITROSTAT) 0.4 mg SL tablet 1 Tab by SubLINGual route every five (5) minutes as needed for Chest Pain (up to 3 doses). Up to 3 doses. 9/19/19  Yes Edwin Hearn MD   aspirin delayed-release 81 mg tablet Take 1 Tab by mouth daily. 12/30/16  Yes Edwin Hearn MD   multivitamin (ONE A DAY) tablet Take 1 Tab by mouth daily.    Yes Provider, Historical     Allergies   Allergen Reactions    Amoxicillin Anaphylaxis       Patient Active Problem List   Diagnosis Code    Diastolic dysfunction V38.26    History of hysterectomy Z90.710    Essential hypertension I10    Hyperlipidemia E78.5    Baker's cyst of knee M71.20    Severe obesity (BMI 35.0-39. 9) E66.01    Controlled type 2 diabetes mellitus without complication, without long-term current use of insulin (HCC) E11.9     Past Surgical History:   Procedure Laterality Date    HX PARTIAL HYSTERECTOMY      [place] CHEST SURGERY PROCEDURE UNLISTED       Family History   Problem Relation Age of Onset    Hypertension Mother     Diabetes Father      Social History     Tobacco Use    Smoking status: Never Smoker    Smokeless tobacco: Never Used   Substance Use Topics    Alcohol use: No       ROS  As stated in HPI, otherwise all others negative. Objective: There were no vitals taken for this visit. General: alert, cooperative, no distress   Mental  status: normal mood, behavior, speech, dress, motor activity, and thought processes, able to follow commands   HENT: NCAT   Neck: no visualized mass   Resp: no respiratory distress   Neuro: no gross deficits   Skin: no discoloration or lesions of concern on visible areas   Psychiatric: normal affect, consistent with stated mood, no evidence of hallucinations     Additional exam findings: We discussed the expected course, resolution and complications of the diagnosis(es) in detail. Medication risks, benefits, costs, interactions, and alternatives were discussed as indicated. I advised her to contact the office if her condition worsens, changes or fails to improve as anticipated. She expressed understanding with the diagnosis(es) and plan. Mandeep Lopez is a 63 y.o. female who was evaluated by a video visit encounter for concerns as above. Patient identification was verified prior to start of the visit. A caregiver was present when appropriate.  Due to this being a TeleHealth encounter (During COVID-19 public health emergency), evaluation of the following organ systems was limited: Vitals/Constitutional/EENT/Resp/CV/GI//MS/Neuro/Skin/Heme-Lymph-Imm. Pursuant to the emergency declaration under the [facility], [facility] waiver authority and the [employer] Act, this Virtual  Visit was conducted, with patient's (and/or legal guardian's) consent, to reduce the patient's risk of exposure to COVID-19 and provide necessary medical care. Services were provided through a video synchronous discussion virtually to substitute for in-person clinic visit. Patient and provider were located at their individual homes. An After Visit Summary was printed and given to the patient. All diagnosis have been discussed with the patient and all of the patient's questions have been answered. Follow-up and Dispositions    · Return for ASAP, Labs, nurse visit, b/p check, X-RAY left hip/pelvis, AND, 3 months, DM, HLD, HTN, VV, 15 min. [place] [id number] [address] [place].   Sara Green

## 2021-02-26 DIAGNOSIS — K21.9 GASTROESOPHAGEAL REFLUX DISEASE, UNSPECIFIED WHETHER ESOPHAGITIS PRESENT: ICD-10-CM

## 2021-02-27 RX ORDER — FAMOTIDINE 20 MG/1
20 TABLET, FILM COATED ORAL EVERY 12 HOURS
Qty: 180 TAB | Refills: 0 | Status: SHIPPED | OUTPATIENT
Start: 2021-02-27 | End: 2021-03-10 | Stop reason: CLARIF

## 2021-03-05 ENCOUNTER — HOSPITAL ENCOUNTER (OUTPATIENT)
Dept: LAB | Age: 66
Discharge: HOME | End: 2021-03-05
Payer: COMMERCIAL

## 2021-03-05 ENCOUNTER — APPOINTMENT (OUTPATIENT)
Dept: FAMILY MEDICINE CLINIC | Age: 66
End: 2021-03-05

## 2021-03-05 DIAGNOSIS — E78.2 MIXED HYPERLIPIDEMIA: ICD-10-CM

## 2021-03-05 DIAGNOSIS — E11.9 CONTROLLED TYPE 2 DIABETES MELLITUS WITHOUT COMPLICATION, WITHOUT LONG-TERM CURRENT USE OF INSULIN (HCC): ICD-10-CM

## 2021-03-05 DIAGNOSIS — I10 ESSENTIAL HYPERTENSION: ICD-10-CM

## 2021-03-05 LAB
ALBUMIN SERPL-MCNC: 4.3 G/DL (ref 3.4–5)
ALBUMIN/GLOB SERPL: 1.3 {RATIO} (ref 0.8–1.7)
ALP SERPL-CCNC: 63 U/L (ref 45–117)
ALT SERPL-CCNC: 20 U/L (ref 13–56)
ANION GAP SERPL CALC-SCNC: 3 MMOL/L (ref 3–18)
AST SERPL-CCNC: 7 U/L (ref 10–38)
BILIRUB SERPL-MCNC: 0.7 MG/DL (ref 0.2–1)
BUN SERPL-MCNC: 18 MG/DL (ref 7–18)
BUN/CREAT SERPL: 22 (ref 12–20)
CALCIUM SERPL-MCNC: 9.9 MG/DL (ref 8.5–10.1)
CHLORIDE SERPL-SCNC: 109 MMOL/L (ref 100–111)
CHOLEST SERPL-MCNC: 106 MG/DL
CO2 SERPL-SCNC: 30 MMOL/L (ref 21–32)
CREAT SERPL-MCNC: 0.82 MG/DL (ref 0.6–1.3)
CREAT UR-MCNC: 98 MG/DL (ref 30–125)
EST. AVERAGE GLUCOSE BLD GHB EST-MCNC: 117 MG/DL
GLOBULIN SER CALC-MCNC: 3.4 G/DL (ref 2–4)
GLUCOSE SERPL-MCNC: 95 MG/DL (ref 74–99)
HBA1C MFR BLD: 5.7 % (ref 4.2–5.6)
HDLC SERPL-MCNC: 59 MG/DL (ref 40–60)
HDLC SERPL: 1.8 {RATIO} (ref 0–5)
LDLC SERPL CALC-MCNC: 26.6 MG/DL (ref 0–100)
LIPID PROFILE,FLP: NORMAL
MICROALBUMIN UR-MCNC: 0.64 MG/DL (ref 0–3)
MICROALBUMIN/CREAT UR-RTO: 7 MG/G (ref 0–30)
POTASSIUM SERPL-SCNC: 4 MMOL/L (ref 3.5–5.5)
PROT SERPL-MCNC: 7.7 G/DL (ref 6.4–8.2)
SODIUM SERPL-SCNC: 142 MMOL/L (ref 136–145)
TRIGL SERPL-MCNC: 102 MG/DL (ref ?–150)
VLDLC SERPL CALC-MCNC: 20.4 MG/DL

## 2021-03-05 PROCEDURE — 80061 LIPID PANEL: CPT

## 2021-03-05 PROCEDURE — 36415 COLL VENOUS BLD VENIPUNCTURE: CPT

## 2021-03-05 PROCEDURE — 80053 COMPREHEN METABOLIC PANEL: CPT

## 2021-03-05 PROCEDURE — 83036 HEMOGLOBIN GLYCOSYLATED A1C: CPT

## 2021-03-05 PROCEDURE — 82043 UR ALBUMIN QUANTITATIVE: CPT

## 2021-03-10 DIAGNOSIS — K21.9 GASTROESOPHAGEAL REFLUX DISEASE, UNSPECIFIED WHETHER ESOPHAGITIS PRESENT: Primary | ICD-10-CM

## 2021-03-10 RX ORDER — OMEPRAZOLE 20 MG/1
20 CAPSULE, DELAYED RELEASE ORAL DAILY
Qty: 90 CAP | Refills: 1 | Status: SHIPPED | OUTPATIENT
Start: 2021-03-10 | End: 2021-05-27 | Stop reason: SDUPTHER

## 2021-03-16 RX ORDER — METOPROLOL SUCCINATE 25 MG/1
TABLET, EXTENDED RELEASE ORAL
Qty: 90 TAB | Refills: 0 | Status: SHIPPED | OUTPATIENT
Start: 2021-03-16 | End: 2021-05-27 | Stop reason: SDUPTHER

## 2021-05-27 ENCOUNTER — VIRTUAL VISIT (OUTPATIENT)
Dept: FAMILY MEDICINE CLINIC | Age: 66
End: 2021-05-27
Payer: COMMERCIAL

## 2021-05-27 DIAGNOSIS — I10 ESSENTIAL HYPERTENSION: ICD-10-CM

## 2021-05-27 DIAGNOSIS — E11.9 CONTROLLED TYPE 2 DIABETES MELLITUS WITHOUT COMPLICATION, WITHOUT LONG-TERM CURRENT USE OF INSULIN (HCC): Primary | ICD-10-CM

## 2021-05-27 DIAGNOSIS — K21.9 GASTROESOPHAGEAL REFLUX DISEASE, UNSPECIFIED WHETHER ESOPHAGITIS PRESENT: ICD-10-CM

## 2021-05-27 DIAGNOSIS — E78.2 MIXED HYPERLIPIDEMIA: ICD-10-CM

## 2021-05-27 PROCEDURE — 99214 OFFICE O/P EST MOD 30 MIN: CPT | Performed by: NURSE PRACTITIONER

## 2021-05-27 RX ORDER — METFORMIN HYDROCHLORIDE 500 MG/1
TABLET, EXTENDED RELEASE ORAL
Qty: 90 TABLET | Refills: 1 | Status: CANCELLED | OUTPATIENT
Start: 2021-05-27

## 2021-05-27 RX ORDER — NITROGLYCERIN 0.4 MG/1
TABLET SUBLINGUAL
Qty: 50 TABLET | Refills: 0 | Status: CANCELLED | OUTPATIENT
Start: 2021-05-27

## 2021-05-27 RX ORDER — FLUTICASONE PROPIONATE 50 MCG
SPRAY, SUSPENSION (ML) NASAL
Qty: 1 BOTTLE | Refills: 3 | Status: CANCELLED | OUTPATIENT
Start: 2021-05-27

## 2021-05-27 RX ORDER — BISMUTH SUBSALICYLATE 262 MG
1 TABLET,CHEWABLE ORAL DAILY
Status: CANCELLED | OUTPATIENT
Start: 2021-05-27

## 2021-05-27 RX ORDER — LISINOPRIL 40 MG/1
40 TABLET ORAL DAILY
Qty: 90 TABLET | Refills: 3 | Status: SHIPPED | OUTPATIENT
Start: 2021-05-27 | End: 2022-07-15

## 2021-05-27 RX ORDER — HYDROCHLOROTHIAZIDE 25 MG/1
25 TABLET ORAL DAILY
Qty: 90 TABLET | Refills: 3 | Status: SHIPPED | OUTPATIENT
Start: 2021-05-27 | End: 2022-07-15

## 2021-05-27 RX ORDER — ETODOLAC 400 MG/1
400 TABLET, FILM COATED ORAL DAILY
Status: CANCELLED | OUTPATIENT
Start: 2021-05-27

## 2021-05-27 RX ORDER — METOPROLOL SUCCINATE 25 MG/1
TABLET, EXTENDED RELEASE ORAL
Qty: 90 TABLET | Refills: 0 | Status: SHIPPED | OUTPATIENT
Start: 2021-05-27 | End: 2021-08-25

## 2021-05-27 RX ORDER — SIMVASTATIN 10 MG/1
TABLET, FILM COATED ORAL
Qty: 90 TABLET | Refills: 3 | Status: SHIPPED | OUTPATIENT
Start: 2021-05-27 | End: 2022-07-15

## 2021-05-27 RX ORDER — AMLODIPINE BESYLATE 5 MG/1
5 TABLET ORAL DAILY
Qty: 90 TABLET | Refills: 3 | Status: SHIPPED | OUTPATIENT
Start: 2021-05-27 | End: 2022-07-15

## 2021-05-27 RX ORDER — OMEPRAZOLE 20 MG/1
20 CAPSULE, DELAYED RELEASE ORAL DAILY
Qty: 90 CAPSULE | Refills: 1 | Status: SHIPPED | OUTPATIENT
Start: 2021-05-27 | End: 2021-11-01

## 2021-05-27 RX ORDER — ASPIRIN 81 MG/1
81 TABLET ORAL DAILY
Qty: 1 TABLET | Refills: 0 | Status: CANCELLED | OUTPATIENT
Start: 2021-05-27

## 2021-05-27 RX ORDER — HYDROCODONE BITARTRATE AND ACETAMINOPHEN 5; 325 MG/1; MG/1
1 TABLET ORAL
Status: CANCELLED | OUTPATIENT
Start: 2021-05-27

## 2021-05-27 NOTE — PROGRESS NOTES
Elvie Garcia. [address]      Mindy Falcon is a 63 y.o. female who was seen by synchronous (real-time) audio-video technology on 5/27/2021. Consent: Mindy Falcon, who was seen by synchronous (real-time) audio-video technology, and/or her healthcare decision maker, is aware that this patient-initiated, Telehealth encounter on 5/27/2021 is a billable service, with coverage as determined by her insurance carrier. She is aware that she may receive a bill and has provided verbal consent to proceed: Yes. Assessment & Plan:   Diagnoses and all orders for this visit:    1. Controlled type 2 diabetes mellitus without complication, without long-term current use of insulin (HCC)  -     empagliflozin (Jardiance) 10 mg tablet; Take 1 Tablet by mouth daily.  -     HEMOGLOBIN A1C WITH EAG; Future  -     MICROALBUMIN, UR, RAND W/ MICROALB/CREAT RATIO; Future  -     CBC W/O DIFF; Future  Endorses medication compliance, Have asked him/her to come in for follow-up labs, Denies signs and symptoms of hyper or hypoglycemia and Endorses a side effect of sexual dysfunction with the Metformin and would like to try a different medication, will start her on Jardiance, reviewed with her potential side effects to monitor for especially yeast infections, she verbalized understanding and is in agreement with this plan of care  2. Essential hypertension  -     metoprolol succinate (TOPROL-XL) 25 mg XL tablet; TAKE 1 TABLET EVERY DAY  -     lisinopriL (PRINIVIL, ZESTRIL) 40 mg tablet; Take 1 Tablet by mouth daily. -     hydroCHLOROthiazide (HYDRODIURIL) 25 mg tablet; Take 1 Tablet by mouth daily. -     amLODIPine (NORVASC) 5 mg tablet; Take 1 Tablet by mouth daily.  -     METABOLIC PANEL, COMPREHENSIVE;  Future  Endorses medication compliance, Refill provided, Have asked him/her to come in for follow-up labs and Blood pressure stable per home checks, continue same medications  3. Mixed hyperlipidemia  -     simvastatin (ZOCOR) 10 mg tablet; TAKE 1 TABLET BY MOUTH AT  NIGHT  -     LIPID PANEL; Future  Endorses medication compliance, Refill provided, Have asked him/her to come in for follow-up labs and Denies abdominal pain or symptoms of myalgia  4. Gastroesophageal reflux disease, unspecified whether esophagitis present  -     omeprazole (PRILOSEC) 20 mg capsule; Take 1 Capsule by mouth daily.  -     CBC W/O DIFF; Future  Endorses medication compliance, Refill provided and Have asked him/her to come in for follow-up labs    Follow-up and Dispositions    · Return in about 3 months (around 8/27/2021) for CPE, DM, DM foot, HLD, HTN, office only, 30 min, AND, labs prior. 712  Subjective:     Health Maintenance Due   Topic Date Due    COVID-19 Vaccine (1) Never done    Foot Exam Q1  02/25/2016    Eye Exam Retinal or Dilated  10/07/2016             Jasbir Howard is a 63 y.o. female who was seen for   Results and Medication Refill    DMII-   Patient reports medication compliance Daily  Diabetic diet compliance most of the time  Patient monitors blood sugars regularly does not have a glucometer   Reports am fasting sugars range does not check   Denies hypoglycemic episodes yes  Denies polyuria, polydipsia, paraesthesia, vision changes?  yes     Diabetic Foot and Eye Exam HM Status   Topic Date Due    Diabetic Foot Care  02/25/2016    Eye Exam  10/07/2016     Hemoglobin A1c   Date Value Ref Range Status   03/05/2021 5.7 (H) 4.2 - 5.6 % Final     Comment:     (NOTE)  HbA1C Interpretive Ranges  <5.7              Normal  5.7 - 6.4         Consider Prediabetes  >6.5              Consider Diabetes     ]  Creatinine, urine   Date Value Ref Range Status   03/05/2021 98.00 30 - 125 mg/dL Final     Microalbumin/Creat ratio (mg/g creat)   Date Value Ref Range Status   03/05/2021 7 0 - 30 mg/g Final     Microalb/Creat ratio (ug/mg creat.)   Date Value Ref Range Status 03/14/2019 4.2 0.0 - 30.0 mg/g creat Final     Comment:                          Normal:                0.0 -  30.0                       Albuminuria:          31.0 - 300.0                       Clinical albuminuria:       >300.0     05/23/2017 5.6 0.0 - 30.0 mg/g creat Final   09/26/2014 3.6 0.0 - 30.0 mg/g creat Final     Comment:     Performed At: [address]  [id number]  Leighton Altman MD  [id number]     Key Antihyperglycemic Medications             empagliflozin (Jardiance) 10 mg tablet (Taking) Take 1 Tablet by mouth daily. Hypertension:   Patient reports taking medications as instructed. yes   Medication side effects noted. no  Headache upon wakening. no   Home BP monitoring in range of 992'Q systolic. Do you experience chest pain/pressure or SOB with exertion? no  Maintain a low salt diet? yes  Key CAD CHF Meds             metoprolol succinate (TOPROL-XL) 25 mg XL tablet (Taking) TAKE 1 TABLET EVERY DAY    simvastatin (ZOCOR) 10 mg tablet (Taking) TAKE 1 TABLET BY MOUTH AT  NIGHT    lisinopriL (PRINIVIL, ZESTRIL) 40 mg tablet (Taking) Take 1 Tablet by mouth daily. hydroCHLOROthiazide (HYDRODIURIL) 25 mg tablet (Taking) Take 1 Tablet by mouth daily. amLODIPine (NORVASC) 5 mg tablet (Taking) Take 1 Tablet by mouth daily. nitroglycerin (NITROSTAT) 0.4 mg SL tablet (Taking) 1 Tab by SubLINGual route every five (5) minutes as needed for Chest Pain (up to 3 doses). Up to 3 doses. aspirin delayed-release 81 mg tablet (Taking) Take 1 Tab by mouth daily. HLD:  Has been compliant with meds  all of the time  Compliant with low-fat diet. most of the time    Denies myalgias or other side effects.  yes  Cholesterol, total   Date Value Ref Range Status   03/05/2021 106 <200 MG/DL Final     Triglyceride   Date Value Ref Range Status   03/05/2021 102 <150 MG/DL Final     Comment:     The drugs N-acetylcysteine (NAC) and  Metamiszole have been found to cause falsely  low results in this chemical assay. Please  be sure to submit blood samples obtained  BEFORE administration of either of these  drugs to assure correct results. HDL Cholesterol   Date Value Ref Range Status   03/05/2021 59 40 - 60 MG/DL Final     LDL, calculated   Date Value Ref Range Status   03/05/2021 26.6 0 - 100 MG/DL Final   ]  Key Antihyperlipidemia Meds             simvastatin (ZOCOR) 10 mg tablet (Taking) TAKE 1 TABLET BY MOUTH AT  NIGHT           Prior to Admission medications    Medication Sig Start Date End Date Taking? Authorizing Provider   metoprolol succinate (TOPROL-XL) 25 mg XL tablet TAKE 1 TABLET EVERY DAY 5/27/21  Yes Renetta Ordoñez NP   omeprazole (PRILOSEC) 20 mg capsule Take 1 Capsule by mouth daily. 5/27/21  Yes Renetta Ordoñez NP   simvastatin (ZOCOR) 10 mg tablet TAKE 1 TABLET BY MOUTH AT  NIGHT 5/27/21  Yes Renetta Ordoñez NP   lisinopriL (PRINIVIL, ZESTRIL) 40 mg tablet Take 1 Tablet by mouth daily. 5/27/21  Yes Renetta Ordoñez NP   hydroCHLOROthiazide (HYDRODIURIL) 25 mg tablet Take 1 Tablet by mouth daily. 5/27/21  Yes Renetta Ordoñez NP   amLODIPine (NORVASC) 5 mg tablet Take 1 Tablet by mouth daily. 5/27/21  Yes Renetta Ordoñez NP   empagliflozin (Jardiance) 10 mg tablet Take 1 Tablet by mouth daily. 5/27/21  Yes Renetta Ordoñez NP   fluticasone propionate (FLONASE) 50 mcg/actuation nasal spray Two sprays each nostril daily. Use for at least two weeks at a time to control symptoms. 6/18/20  Yes Bere Sheridan MD   nitroglycerin (NITROSTAT) 0.4 mg SL tablet 1 Tab by SubLINGual route every five (5) minutes as needed for Chest Pain (up to 3 doses). Up to 3 doses. 9/19/19  Yes Bere Sheridan MD   aspirin delayed-release 81 mg tablet Take 1 Tab by mouth daily. 12/30/16  Yes Bere Sheridan MD   multivitamin (ONE A DAY) tablet Take 1 Tab by mouth daily.    Yes Provider, Historical   metoprolol succinate (TOPROL-XL) 25 mg XL tablet TAKE 1 TABLET EVERY DAY 3/16/21 5/27/21  Ernst Hollins NP   omeprazole (PRILOSEC) 20 mg capsule Take 1 Cap by mouth daily. 3/10/21 5/27/21  Ernst Hollins NP   simvastatin (ZOCOR) 10 mg tablet TAKE 1 TABLET BY MOUTH AT  NIGHT 11/24/20 5/27/21  Ruba Lion MD   lisinopriL (PRINIVIL, ZESTRIL) 40 mg tablet TAKE 1 TABLET BY MOUTH  DAILY 11/24/20 5/27/21  Ruba Lion MD   hydroCHLOROthiazide (HYDRODIURIL) 25 mg tablet TAKE 1 TABLET BY MOUTH  DAILY 11/24/20 5/27/21  Ruba Lion MD   amLODIPine (NORVASC) 5 mg tablet TAKE 1 TABLET BY MOUTH  DAILY 11/24/20 5/27/21  Ruba Lion MD   metFORMIN ER (GLUCOPHAGE XR) 500 mg tablet One PO daily 11/10/20 5/27/21  Ruba Lion MD   HYDROcodone-acetaminophen [facility]) 5-325 mg per tablet Take 1 Tab by mouth every eight (8) hours as needed. 6/24/20 5/27/21  Provider, Historical   etodolac (LODINE) 400 mg tablet Take 400 mg by mouth daily. 6/26/20 5/27/21  Provider, Historical   nitroglycerin (NITROSTAT) 0.4 mg SL tablet DISSOLVE 1 TABLET UNDER THE TONGUE EVERY 5 MIN AS  NEEDED FOR CHEST PAIN UP TO 3 TABS IN 15 MIN. CALL 911  IF CHEST PAIN PERSISTS 10/9/19 5/27/21  Ruba Lion MD     Allergies   Allergen Reactions    Amoxicillin Anaphylaxis    Metformin Other (comments)     Sexual dysfunction       Patient Active Problem List   Diagnosis Code    Diastolic dysfunction F31.14    History of hysterectomy Z90.710    Essential hypertension I10    Hyperlipidemia E78.5    Baker's cyst of knee M71.20    Severe obesity (BMI 35.0-39. 9) E66.01    Controlled type 2 diabetes mellitus without complication, without long-term current use of insulin (HCC) E11.9    Gastroesophageal reflux disease K21.9     Past Surgical History:   Procedure Laterality Date    HX PARTIAL HYSTERECTOMY      NV CHEST SURGERY PROCEDURE UNLISTED       Family History   Problem Relation Age of Onset    Hypertension Mother     Diabetes Father      Social History     Tobacco Use    Smoking status: Never Smoker    Smokeless tobacco: Never Used   Substance Use Topics    Alcohol use: No       ROS  As stated in HPI, otherwise all others negative. Objective: There were no vitals taken for this visit. General: alert, cooperative, no distress   Mental  status: normal mood, behavior, speech, dress, motor activity, and thought processes, able to follow commands   HENT: NCAT   Neck: no visualized mass   Resp: no respiratory distress   Neuro: no gross deficits   Skin: no discoloration or lesions of concern on visible areas   Psychiatric: normal affect, consistent with stated mood, no evidence of hallucinations     Additional exam findings: We discussed the expected course, resolution and complications of the diagnosis(es) in detail. Medication risks, benefits, costs, interactions, and alternatives were discussed as indicated. I advised her to contact the office if her condition worsens, changes or fails to improve as anticipated. She expressed understanding with the diagnosis(es) and plan. Deedee Apodaca is a 63 y.o. female who was evaluated by a video visit encounter for concerns as above. Patient identification was verified prior to start of the visit. A caregiver was present when appropriate. Due to this being a TeleHealth encounter (During GAUMM-32 public health emergency), evaluation of the following organ systems was limited: Vitals/Constitutional/EENT/Resp/CV/GI//MS/Neuro/Skin/Heme-Lymph-Imm. Pursuant to the emergency declaration under the [facility], 1135 waiver authority and the [employer] and [employer] General Act, this Virtual  Visit was conducted, with patient's (and/or legal guardian's) consent, to reduce the patient's risk of exposure to COVID-19 and provide necessary medical care.      Services were provided through a video synchronous discussion virtually to substitute for in-person clinic visit. Patient and provider were located at their individual homes. An After Visit Summary was printed and given to the patient. All diagnosis have been discussed with the patient and all of the patient's questions have been answered. Follow-up and Dispositions    · Return in about 3 months (around 8/27/2021) for CPE, DM, DM foot, HLD, HTN, office only, 30 min, AND, labs prior. Francesca Root, [facility] [id number] [address]   [address]

## 2021-08-24 ENCOUNTER — HOSPITAL ENCOUNTER (OUTPATIENT)
Dept: LAB | Age: 66
Discharge: HOME | End: 2021-08-24
Payer: COMMERCIAL

## 2021-08-24 DIAGNOSIS — I10 ESSENTIAL HYPERTENSION: ICD-10-CM

## 2021-08-24 LAB
ALBUMIN SERPL-MCNC: 4 G/DL (ref 3.4–5)
ALBUMIN/GLOB SERPL: 1.2 {RATIO} (ref 0.8–1.7)
ALP SERPL-CCNC: 65 U/L (ref 45–117)
ALT SERPL-CCNC: 21 U/L (ref 13–56)
ANION GAP SERPL CALC-SCNC: 6 MMOL/L (ref 3–18)
AST SERPL-CCNC: 14 U/L (ref 10–38)
BILIRUB SERPL-MCNC: 0.5 MG/DL (ref 0.2–1)
BUN SERPL-MCNC: 21 MG/DL (ref 7–18)
BUN/CREAT SERPL: 24 (ref 12–20)
CALCIUM SERPL-MCNC: 9.4 MG/DL (ref 8.5–10.1)
CHLORIDE SERPL-SCNC: 109 MMOL/L (ref 100–111)
CHOLEST SERPL-MCNC: 115 MG/DL
CO2 SERPL-SCNC: 27 MMOL/L (ref 21–32)
CREAT SERPL-MCNC: 0.87 MG/DL (ref 0.6–1.3)
CREAT UR-MCNC: 88 MG/DL (ref 30–125)
ERYTHROCYTE [DISTWIDTH] IN BLOOD BY AUTOMATED COUNT: 14.3 % (ref 11.6–14.5)
EST. AVERAGE GLUCOSE BLD GHB EST-MCNC: 131 MG/DL
GLOBULIN SER CALC-MCNC: 3.4 G/DL (ref 2–4)
GLUCOSE SERPL-MCNC: 102 MG/DL (ref 74–99)
HBA1C MFR BLD: 6.2 % (ref 4.2–5.6)
HCT VFR BLD AUTO: 40.3 % (ref 35–45)
HDLC SERPL-MCNC: 57 MG/DL (ref 40–60)
HDLC SERPL: 2 {RATIO} (ref 0–5)
HGB BLD-MCNC: 12.2 G/DL (ref 12–16)
LDLC SERPL CALC-MCNC: 38.4 MG/DL (ref 0–100)
LIPID PROFILE,FLP: NORMAL
MCH RBC QN AUTO: 25.3 PG (ref 24–34)
MCHC RBC AUTO-ENTMCNC: 30.3 G/DL (ref 31–37)
MCV RBC AUTO: 83.4 FL (ref 78–100)
MICROALBUMIN UR-MCNC: 1.47 MG/DL (ref 0–3)
MICROALBUMIN/CREAT UR-RTO: 17 MG/G (ref 0–30)
PLATELET # BLD AUTO: 325 K/UL (ref 135–420)
PMV BLD AUTO: 9.5 FL (ref 9.2–11.8)
POTASSIUM SERPL-SCNC: 3.8 MMOL/L (ref 3.5–5.5)
PROT SERPL-MCNC: 7.4 G/DL (ref 6.4–8.2)
RBC # BLD AUTO: 4.83 M/UL (ref 4.2–5.3)
SODIUM SERPL-SCNC: 142 MMOL/L (ref 136–145)
TRIGL SERPL-MCNC: 98 MG/DL (ref ?–150)
VLDLC SERPL CALC-MCNC: 19.6 MG/DL
WBC # BLD AUTO: 9.9 K/UL (ref 4.6–13.2)

## 2021-08-24 PROCEDURE — 82043 UR ALBUMIN QUANTITATIVE: CPT

## 2021-08-24 PROCEDURE — 83036 HEMOGLOBIN GLYCOSYLATED A1C: CPT

## 2021-08-24 PROCEDURE — 80053 COMPREHEN METABOLIC PANEL: CPT

## 2021-08-24 PROCEDURE — 85027 COMPLETE CBC AUTOMATED: CPT

## 2021-08-24 PROCEDURE — 80061 LIPID PANEL: CPT

## 2021-08-24 PROCEDURE — 36415 COLL VENOUS BLD VENIPUNCTURE: CPT

## 2021-08-25 RX ORDER — METOPROLOL SUCCINATE 25 MG/1
TABLET, EXTENDED RELEASE ORAL
Qty: 90 TABLET | Refills: 3 | Status: SHIPPED | OUTPATIENT
Start: 2021-08-25

## 2021-09-01 ENCOUNTER — OFFICE VISIT (OUTPATIENT)
Dept: FAMILY MEDICINE CLINIC | Age: 66
End: 2021-09-01
Payer: COMMERCIAL

## 2021-09-01 VITALS
OXYGEN SATURATION: 97 % | TEMPERATURE: 98.1 F | SYSTOLIC BLOOD PRESSURE: 133 MMHG | BODY MASS INDEX: 35.51 KG/M2 | HEART RATE: 68 BPM | HEIGHT: 64 IN | WEIGHT: 208 LBS | RESPIRATION RATE: 20 BRPM | DIASTOLIC BLOOD PRESSURE: 79 MMHG

## 2021-09-01 DIAGNOSIS — M46.1 SACROILIITIS (HCC): ICD-10-CM

## 2021-09-01 DIAGNOSIS — M54.32 SCIATICA OF LEFT SIDE: ICD-10-CM

## 2021-09-01 DIAGNOSIS — I10 ESSENTIAL HYPERTENSION: ICD-10-CM

## 2021-09-01 DIAGNOSIS — E11.9 CONTROLLED TYPE 2 DIABETES MELLITUS WITHOUT COMPLICATION, WITHOUT LONG-TERM CURRENT USE OF INSULIN (HCC): Primary | ICD-10-CM

## 2021-09-01 DIAGNOSIS — E78.2 MIXED HYPERLIPIDEMIA: ICD-10-CM

## 2021-09-01 PROCEDURE — 99214 OFFICE O/P EST MOD 30 MIN: CPT | Performed by: NURSE PRACTITIONER

## 2021-09-01 RX ORDER — METHYLPREDNISOLONE 4 MG/1
TABLET ORAL
Qty: 1 DOSE PACK | Refills: 0 | Status: SHIPPED
Start: 2021-09-01 | End: 2022-01-11

## 2021-09-01 RX ORDER — CYCLOBENZAPRINE HCL 5 MG
5 TABLET ORAL
Qty: 30 TABLET | Refills: 0 | Status: SHIPPED | OUTPATIENT
Start: 2021-09-01 | End: 2022-04-18 | Stop reason: SDUPTHER

## 2021-09-01 NOTE — PATIENT INSTRUCTIONS

## 2021-09-01 NOTE — PROGRESS NOTES
Rach               [address]               [phone]      Tracy Leach is a 63 y.o. female and presents with Follow Up Chronic Condition, Diabetes, Hypertension, and Hypothyroidism       Assessment/Plan:    Diagnoses and all orders for this visit:    1. Controlled type 2 diabetes mellitus without complication, without long-term current use of insulin (HCC)  -     HEMOGLOBIN A1C WITH EAG; Future  Endorses medication compliance, Follow up labs prior to next visit and Denies signs and symptoms of hyper or hypoglycemia   2. Essential hypertension  -     METABOLIC PANEL, COMPREHENSIVE; Future  Endorses medication compliance, Follow up labs prior to next visit and Blood pressure stable, continue same medications   3. Mixed hyperlipidemia  -     LIPID PANEL; Future  Endorses medication compliance, Follow up labs prior to next visit and Denies abdominal pain or symptoms of myalgia   4. Sciatica of left side  -     REFERRAL TO ORTHOPEDICS  -     methylPREDNISolone (MEDROL DOSEPACK) 4 mg tablet; Take as instructed on package  -     cyclobenzaprine (FLEXERIL) 5 mg tablet; Take 1 Tablet by mouth three (3) times daily as needed for Muscle Spasm(s). she has had this problem in the past but states the pain is worse with this episode, pt. states the pain was relieved with steroids in the past, will give course of steroids and muscle relaxers, refer to ortho for further evaluation since this is a recurrent problem with c/o feelings of leg instability   5. Sacroiliitis (HCC)  -     REFERRAL TO ORTHOPEDICS  -     methylPREDNISolone (MEDROL DOSEPACK) 4 mg tablet; Take as instructed on package  -     cyclobenzaprine (FLEXERIL) 5 mg tablet; Take 1 Tablet by mouth three (3) times daily as needed for Muscle Spasm(s). Follow-up and Dispositions    · Return in about 4 months (around 1/1/2022) for DM, HLD, HTN, 15 min, office only, AND, labs prior.          Subjective:    Left sciatica  Saw  gretchensusu 7/2020, ordered PT  She did not go to PT due to the pandemic  Prior to the visit had gone to urgent care and ED for the problem  diagnsosed with sacroilliitis and sciatic, given steroids and muscle relaxers  She had x-rays at ED visit 7/2020  We discussed this problem 2/2021, she came in for x-rays of L and R hip, results for both: No significant osseous abnormality identified. Joint space appears fairly  well-preserved  Problem today:  States the pain had resolved but returned last week, the pain is worse than before  Extending from buttocks down leg, through thigh and to the calf  Has sharp pains when walking  Denies any back pain  For work she does sit in a chair in front of a computer for 8 hours  Treatments: icyhot, aspercreme, tylenol, advil, exercises  Relieving: getting up and moving around  [address]: lyng down  Denies bowel or bladder problems,   Endorses leg instability-like it will give out underneath her      DMII-   Patient reports medication compliance Daily  Diabetic diet compliance most of the time  Patient monitors blood sugars regularly never   Reports am fasting sugars range does not check   Denies hypoglycemic episodes yes  Denies polyuria, polydipsia, paraesthesia, vision changes?  yes     Diabetic Foot and Eye Exam HM Status   Topic Date Due    Diabetic Foot Care  02/25/2016    Eye Exam  10/07/2016     Hemoglobin A1c   Date Value Ref Range Status   08/24/2021 6.2 (H) 4.2 - 5.6 % Final     Comment:     (NOTE)  HbA1C Interpretive Ranges  <5.7              Normal  5.7 - 6.4         Consider Prediabetes  >6.5              Consider Diabetes     ]  Creatinine, urine   Date Value Ref Range Status   08/24/2021 88.00 30 - 125 mg/dL Final     Microalbumin/Creat ratio (mg/g creat)   Date Value Ref Range Status   08/24/2021 17 0 - 30 mg/g Final   03/05/2021 7 0 - 30 mg/g Final     Microalb/Creat ratio (ug/mg creat.)   Date Value Ref Range Status   03/14/2019 4.2 0.0 - 30.0 mg/g creat Final Comment:                          Normal:                0.0 -  30.0                       Albuminuria:          31.0 - 300.0                       Clinical albuminuria:       >300.0     05/23/2017 5.6 0.0 - 30.0 mg/g creat Final   09/26/2014 3.6 0.0 - 30.0 mg/g creat Final     Comment:     Performed At: [place] [address]  [address], [place]  [id number]  Costa Rangel MD  [id number]     Key Antihyperglycemic Medications             empagliflozin (Jardiance) 10 mg tablet (Taking) Take 1 Tablet by mouth daily. Hypertension:   Patient reports taking medications as instructed. yes   Medication side effects noted. no  Headache upon wakening. no   Home BP monitoring in range of does not remember's systolic. Do you experience chest pain/pressure or SOB with exertion? no  Maintain a low salt diet? yes  Key CAD CHF Meds             simvastatin (ZOCOR) 10 mg tablet (Taking) TAKE 1 TABLET BY MOUTH AT  NIGHT    lisinopriL (PRINIVIL, ZESTRIL) 40 mg tablet (Taking) Take 1 Tablet by mouth daily. hydroCHLOROthiazide (HYDRODIURIL) 25 mg tablet (Taking) Take 1 Tablet by mouth daily. amLODIPine (NORVASC) 5 mg tablet (Taking) Take 1 Tablet by mouth daily. nitroglycerin (NITROSTAT) 0.4 mg SL tablet (Taking) 1 Tab by SubLINGual route every five (5) minutes as needed for Chest Pain (up to 3 doses). Up to 3 doses. aspirin delayed-release 81 mg tablet (Taking) Take 1 Tab by mouth daily. metoprolol succinate (TOPROL-XL) 25 mg XL tablet TAKE 1 TABLET BY MOUTH  DAILY        HLD:  Has been compliant with meds  all of the time  Compliant with low-fat diet. most of the time    Denies myalgias or other side effects.  yes  Cholesterol, total   Date Value Ref Range Status   08/24/2021 115 <200 MG/DL Final     Triglyceride   Date Value Ref Range Status   08/24/2021 98 <150 MG/DL Final     Comment:     The drugs N-acetylcysteine (NAC) and  Metamiszole have been found to cause falsely  low results in this chemical assay. Please  be sure to submit blood samples obtained  BEFORE administration of either of these  drugs to assure correct results. HDL Cholesterol   Date Value Ref Range Status   08/24/2021 57 40 - 60 MG/DL Final     LDL, calculated   Date Value Ref Range Status   08/24/2021 38.4 0 - 100 MG/DL Final   ]  Key Antihyperlipidemia Meds             simvastatin (ZOCOR) 10 mg tablet (Taking) TAKE 1 TABLET BY MOUTH AT  NIGHT           ROS:     ROS  As stated in HPI, otherwise all others negative. The problem list was updated as a part of today's visit. Patient Active Problem List   Diagnosis Code    Diastolic dysfunction T42.57    History of hysterectomy Z90.710    Essential hypertension I10    Hyperlipidemia E78.5    Baker's cyst of knee M71.20    Severe obesity (BMI 35.0-39. 9) E66.01    Controlled type 2 diabetes mellitus without complication, without long-term current use of insulin (HCC) E11.9    Gastroesophageal reflux disease K21.9       The PSH, FH were reviewed. SH:  Social History     Tobacco Use    Smoking status: Never Smoker    Smokeless tobacco: Never Used   Vaping Use    Vaping Use: Never used   Substance Use Topics    Alcohol use: No    Drug use: No       Medications/Allergies:  Current Outpatient Medications on File Prior to Visit   Medication Sig Dispense Refill    omeprazole (PRILOSEC) 20 mg capsule Take 1 Capsule by mouth daily. 90 Capsule 1    simvastatin (ZOCOR) 10 mg tablet TAKE 1 TABLET BY MOUTH AT  NIGHT 90 Tablet 3    lisinopriL (PRINIVIL, ZESTRIL) 40 mg tablet Take 1 Tablet by mouth daily. 90 Tablet 3    hydroCHLOROthiazide (HYDRODIURIL) 25 mg tablet Take 1 Tablet by mouth daily. 90 Tablet 3    amLODIPine (NORVASC) 5 mg tablet Take 1 Tablet by mouth daily. 90 Tablet 3    empagliflozin (Jardiance) 10 mg tablet Take 1 Tablet by mouth daily.  30 Tablet 0    nitroglycerin (NITROSTAT) 0.4 mg SL tablet 1 Tab by SubLINGual route every five (5) minutes as needed for Chest Pain (up to 3 doses). Up to 3 doses. 25 Tab 1    aspirin delayed-release 81 mg tablet Take 1 Tab by mouth daily. 1 Tab 0    multivitamin (ONE A DAY) tablet Take 1 Tab by mouth daily.  metoprolol succinate (TOPROL-XL) 25 mg XL tablet TAKE 1 TABLET BY MOUTH  DAILY 90 Tablet 3    fluticasone propionate (FLONASE) 50 mcg/actuation nasal spray Two sprays each nostril daily. Use for at least two weeks at a time to control symptoms. 1 Bottle 3     No current facility-administered medications on file prior to visit. Allergies   Allergen Reactions    Amoxicillin Anaphylaxis    Metformin Other (comments)     Sexual dysfunction       Objective:  Visit Vitals  /79 (BP 1 Location: Left arm, BP Patient Position: Sitting, BP Cuff Size: Adult)   Pulse 68   Temp 98.1 °F (36.7 °C) (Temporal)   Resp 20   Ht 5' 4\" (1.626 m)   Wt 208 lb (94.3 kg)   SpO2 97%   BMI 35.70 kg/m²    Body mass index is 35.7 kg/m². Physical assessment  Physical Exam  Vitals and nursing note reviewed. Eyes:      Conjunctiva/sclera: Conjunctivae normal.      Pupils: Pupils are equal, round, and reactive to light. Neck:      Vascular: No JVD. Cardiovascular:      Rate and Rhythm: Normal rate and regular rhythm. Heart sounds: Normal heart sounds. No murmur heard. No friction rub. No gallop. Pulmonary:      Effort: Pulmonary effort is normal.      Breath sounds: Normal breath sounds. Musculoskeletal:         General: Normal range of motion. Cervical back: Normal range of motion. Skin:     General: Skin is warm and dry. Neurological:      Mental Status: She is alert and oriented to person, place, and time.    Psychiatric:         Mood and Affect: Affect normal.         Cognition and Memory: Memory normal.         Judgment: Judgment normal.           Labwork and Ancillary Studies:    CBC w/Diff  Lab Results   Component Value Date/Time    WBC 9.9 08/24/2021 08:20 AM    HGB 12.2 08/24/2021 08:20 AM PLATELET 969 36/04/2451 08:20 AM         Basic Metabolic Profile  Lab Results   Component Value Date/Time    Sodium 142 08/24/2021 08:20 AM    Potassium 3.8 08/24/2021 08:20 AM    Chloride 109 08/24/2021 08:20 AM    CO2 27 08/24/2021 08:20 AM    Anion gap 6 08/24/2021 08:20 AM    Glucose 102 (H) 08/24/2021 08:20 AM    BUN 21 (H) 08/24/2021 08:20 AM    Creatinine 0.87 08/24/2021 08:20 AM    BUN/Creatinine ratio 24 (H) 08/24/2021 08:20 AM    GFR est AA >60 08/24/2021 08:20 AM    GFR est non-AA >60 08/24/2021 08:20 AM    Calcium 9.4 08/24/2021 08:20 AM        Cholesterol  Lab Results   Component Value Date/Time    Cholesterol, total 115 08/24/2021 08:20 AM    HDL Cholesterol 57 08/24/2021 08:20 AM    LDL, calculated 38.4 08/24/2021 08:20 AM    Triglyceride 98 08/24/2021 08:20 AM    CHOL/HDL Ratio 2.0 08/24/2021 08:20 AM       Health Maintenance:   Health Maintenance   Topic Date Due    Foot Exam Q1  02/25/2016    Eye Exam Retinal or Dilated  10/07/2016    Colorectal Cancer Screening Combo  08/27/2021    Flu Vaccine (1) 09/01/2021    A1C test (Diabetic or Prediabetic)  08/24/2022    MICROALBUMIN Q1  08/24/2022    Lipid Screen  08/24/2022    Breast Cancer Screen Mammogram  08/14/2023    DTaP/Tdap/Td series (2 - Td or Tdap) 11/12/2024    Hepatitis C Screening  Completed    Shingrix Vaccine Age 50>  Completed    COVID-19 Vaccine  Completed    Pneumococcal 0-64 years  Aged Out       I have discussed the diagnosis with the patient and the intended plan as seen in the above orders. The patient has received an After-Visit Summary and questions were answered concerning future plans. An After Visit Summary was printed and given to the patient. All diagnosis have been discussed with the patient and all of the patient's questions have been answered. Follow-up and Dispositions    · Return in about 4 months (around 1/1/2022) for DM, HLD, HTN, 15 min, office only, AND, labs prior.            Cheo Fletcher, AGNP-BC  [address] [facility]   [address] [facility] [address]  [id number]

## 2021-09-01 NOTE — PROGRESS NOTES
Room Number 8    Patient would like to discuss medication for sciatic nerve     Did you take your medication today ? Yes    1. Have you been to the ER, urgent care clinic since your last visit? Hospitalized since your last visit? No     2. Have you seen or consulted any other health care providers outside of the [address] [place] [address] since your last visit? Include any pap smears or colon screening.  No      Health Maintenance Due   Topic Date Due    Foot Exam Q1  02/25/2016    Eye Exam Retinal or Dilated  10/07/2016    Colorectal Cancer Screening Combo  08/27/2021    Flu Vaccine (1) 09/01/2021

## 2021-10-29 DIAGNOSIS — K21.9 GASTROESOPHAGEAL REFLUX DISEASE, UNSPECIFIED WHETHER ESOPHAGITIS PRESENT: ICD-10-CM

## 2021-11-01 RX ORDER — OMEPRAZOLE 20 MG/1
20 CAPSULE, DELAYED RELEASE ORAL DAILY
Qty: 90 CAPSULE | Refills: 3 | Status: SHIPPED | OUTPATIENT
Start: 2021-11-01

## 2022-01-04 ENCOUNTER — HOSPITAL ENCOUNTER (OUTPATIENT)
Dept: LAB | Age: 67
Discharge: HOME | End: 2022-01-04
Payer: COMMERCIAL

## 2022-01-04 LAB
ALBUMIN SERPL-MCNC: 3.7 G/DL (ref 3.4–5)
ALBUMIN/GLOB SERPL: 1.1 {RATIO} (ref 0.8–1.7)
ALP SERPL-CCNC: 69 U/L (ref 45–117)
ALT SERPL-CCNC: 24 U/L (ref 13–56)
ANION GAP SERPL CALC-SCNC: 4 MMOL/L (ref 3–18)
AST SERPL-CCNC: 11 U/L (ref 10–38)
BILIRUB SERPL-MCNC: 0.5 MG/DL (ref 0.2–1)
BUN SERPL-MCNC: 13 MG/DL (ref 7–18)
BUN/CREAT SERPL: 16 (ref 12–20)
CALCIUM SERPL-MCNC: 9.5 MG/DL (ref 8.5–10.1)
CHLORIDE SERPL-SCNC: 107 MMOL/L (ref 100–111)
CHOLEST SERPL-MCNC: 104 MG/DL
CO2 SERPL-SCNC: 31 MMOL/L (ref 21–32)
CREAT SERPL-MCNC: 0.79 MG/DL (ref 0.6–1.3)
EST. AVERAGE GLUCOSE BLD GHB EST-MCNC: 134 MG/DL
GLOBULIN SER CALC-MCNC: 3.3 G/DL (ref 2–4)
GLUCOSE SERPL-MCNC: 104 MG/DL (ref 74–99)
HBA1C MFR BLD: 6.3 % (ref 4.2–5.6)
HDLC SERPL-MCNC: 52 MG/DL (ref 40–60)
HDLC SERPL: 2 {RATIO} (ref 0–5)
LDLC SERPL CALC-MCNC: 28.2 MG/DL (ref 0–100)
LIPID PROFILE,FLP: NORMAL
POTASSIUM SERPL-SCNC: 4 MMOL/L (ref 3.5–5.5)
PROT SERPL-MCNC: 7 G/DL (ref 6.4–8.2)
SODIUM SERPL-SCNC: 142 MMOL/L (ref 136–145)
TRIGL SERPL-MCNC: 119 MG/DL (ref ?–150)
VLDLC SERPL CALC-MCNC: 23.8 MG/DL

## 2022-01-04 PROCEDURE — 80061 LIPID PANEL: CPT

## 2022-01-04 PROCEDURE — 36415 COLL VENOUS BLD VENIPUNCTURE: CPT

## 2022-01-04 PROCEDURE — 83036 HEMOGLOBIN GLYCOSYLATED A1C: CPT

## 2022-01-04 PROCEDURE — 80053 COMPREHEN METABOLIC PANEL: CPT

## 2022-01-11 ENCOUNTER — VIRTUAL VISIT (OUTPATIENT)
Dept: FAMILY MEDICINE CLINIC | Age: 67
End: 2022-01-11
Payer: COMMERCIAL

## 2022-01-11 DIAGNOSIS — I10 ESSENTIAL HYPERTENSION: ICD-10-CM

## 2022-01-11 DIAGNOSIS — E11.9 CONTROLLED TYPE 2 DIABETES MELLITUS WITHOUT COMPLICATION, WITHOUT LONG-TERM CURRENT USE OF INSULIN (HCC): Primary | ICD-10-CM

## 2022-01-11 DIAGNOSIS — E78.2 MIXED HYPERLIPIDEMIA: ICD-10-CM

## 2022-01-11 DIAGNOSIS — B37.31 VAGINAL YEAST INFECTION: ICD-10-CM

## 2022-01-11 PROCEDURE — 99214 OFFICE O/P EST MOD 30 MIN: CPT | Performed by: NURSE PRACTITIONER

## 2022-01-11 RX ORDER — FLUCONAZOLE 150 MG/1
150 TABLET ORAL DAILY
Qty: 1 TABLET | Refills: 0 | Status: SHIPPED | OUTPATIENT
Start: 2022-01-11 | End: 2022-01-12

## 2022-01-11 RX ORDER — METFORMIN HYDROCHLORIDE 500 MG/1
500 TABLET, EXTENDED RELEASE ORAL
Qty: 90 TABLET | Refills: 3 | Status: SHIPPED | OUTPATIENT
Start: 2022-01-11

## 2022-01-11 NOTE — PROGRESS NOTES
Please use this number [phone]    Did you take your medication today? Yes       1. Have you been to the ER, urgent care clinic since your last visit? Hospitalized since your last visit? No    2. Have you seen or consulted any other health care providers outside of the [address] [place] [address] since your last visit? Include any pap smears or colon screening.  No    3 most recent PHQ Screens 1/11/2022   Little interest or pleasure in doing things Not at all   Feeling down, depressed, irritable, or hopeless Not at all   Total Score PHQ 2 0         Health Maintenance Due   Topic Date Due    Foot Exam Q1  02/25/2016    Eye Exam Retinal or Dilated  10/07/2016    Colorectal Cancer Screening Combo  08/27/2021    Flu Vaccine (1) 09/01/2021       Learning Assessment 6/4/2015   PRIMARY LEARNER Patient   HIGHEST LEVEL OF EDUCATION - PRIMARY LEARNER  SOME COLLEGE   BARRIERS PRIMARY LEARNER -   PRIMARY LANGUAGE ENGLISH   LEARNER PREFERENCE PRIMARY DEMONSTRATION   ANSWERED BY self   RELATIONSHIP SELF

## 2022-01-11 NOTE — PROGRESS NOTES
[facility], [address]      Nick Horowitz is a 61 y.o. female who was seen by synchronous (real-time) audio-video technology on 1/11/2022. Consent: Nick Horowitz, who was seen by synchronous (real-time) audio-video technology, and/or her healthcare decision maker, is aware that this patient-initiated, Telehealth encounter on 1/11/2022 is a billable service, with coverage as determined by her insurance carrier. She is aware that she may receive a bill and has provided verbal consent to proceed: Yes. Assessment & Plan:   Diagnoses and all orders for this visit:    1. Controlled type 2 diabetes mellitus without complication, without long-term current use of insulin (HCC)  -     HEMOGLOBIN A1C WITH EAG; Future  -     metFORMIN ER (GLUCOPHAGE XR) 500 mg tablet; Take 1 Tablet by mouth daily (with dinner). Endorses medication compliance, Follow up labs prior to next visit and she has been having side effects r/t jardicance, we discussed changing back to metformin, she decided she would like to go back on metformin. DC jardiance. 2. Essential hypertension  -     METABOLIC PANEL, COMPREHENSIVE; Future  Endorses medication compliance, Follow up labs prior to next visit and Blood pressure stable, continue same medications   3. Mixed hyperlipidemia  -     LIPID PANEL; Future  Endorses medication compliance, Follow up labs prior to next visit and Denies abdominal pain or symptoms of myalgia   4. Vaginal yeast infection  -     fluconazole (DIFLUCAN) 150 mg tablet; Take 1 Tablet by mouth daily for 1 day. FDA advises cautious prescribing of oral fluconazole in pregnancy.   She endorses signs and symptoms consistent with vaginal yeast infection with onset when starting jardiance, treat with a dose of diflucan  Patient verbalized understanding and is in agreement with this plan of care    Follow-up and Dispositions    · Return in about 4 months (around 5/11/2022) for DM, HLD, HTN, 15 min, office only, with, labs prior. Labs were obtained prior to todays visit      712  Subjective:     Health Maintenance Due   Topic Date Due    Foot Exam Q1  02/25/2016    Eye Exam Retinal or Dilated  10/07/2016    Colorectal Cancer Screening Combo  08/27/2021    Flu Vaccine (1) 09/01/2021             Iesha Oneil is a 61 y.o. female who was seen for   Follow Up Chronic Condition, Hypertension, Diabetes, and Cholesterol Problem    DMII-    Patient reports medication compliance Daily, started Jardiance in 5/2021, has been getting \"bumps\" on her labia, this is constant, she is also experiencing stomach cramps  Diabetic diet compliance most of the time  Patient monitors blood sugars regularly never   Reports am fasting sugars range does not check   Denies hypoglycemic episodes yes  Denies polyuria, polydipsia, paraesthesia, vision changes? yes  Engaging in daily exercise?  Yes: Comment: aerobics 5 days a week     Diabetic Foot and Eye Exam HM Status   Topic Date Due    Diabetic Foot Care  02/25/2016    Eye Exam  10/07/2016     Hemoglobin A1c   Date Value Ref Range Status   01/04/2022 6.3 (H) 4.2 - 5.6 % Final     Comment:     (NOTE)  HbA1C Interpretive Ranges  <5.7              Normal  5.7 - 6.4         Consider Prediabetes  >6.5              Consider Diabetes     ]  Creatinine, urine   Date Value Ref Range Status   08/24/2021 88.00 30 - 125 mg/dL Final     Microalbumin/Creat ratio (mg/g creat)   Date Value Ref Range Status   08/24/2021 17 0 - 30 mg/g Final   03/05/2021 7 0 - 30 mg/g Final     Microalb/Creat ratio (ug/mg creat.)   Date Value Ref Range Status   03/14/2019 4.2 0.0 - 30.0 mg/g creat Final     Comment:                          Normal:                0.0 -  30.0                       Albuminuria:          31.0 - 300.0                       Clinical albuminuria:       >300.0     05/23/2017 5.6 0.0 - 30.0 mg/g creat Final   09/26/2014 3.6 0.0 - 30.0 mg/g manish Final     Comment:     Performed At: [facility]  [address] [place], [place]  [id number]  Joshua Patel MD  [phone]     Key Antihyperglycemic Medications             metFORMIN ER (GLUCOPHAGE XR) 500 mg tablet (Taking) Take 1 Tablet by mouth daily (with dinner). Hypertension:  There were no vitals taken for this visit. Patient reports taking medications as instructed. yes   Medication side effects noted. no  Headache upon wakening. no   Home BP monitoring: Does not check  Do you experience chest pain/pressure or SOB with exertion? no  Maintain a low Sodium diet? yes  Key CAD CHF Meds             metoprolol succinate (TOPROL-XL) 25 mg XL tablet (Taking) TAKE 1 TABLET BY MOUTH  DAILY    simvastatin (ZOCOR) 10 mg tablet (Taking) TAKE 1 TABLET BY MOUTH AT  NIGHT    lisinopriL (PRINIVIL, ZESTRIL) 40 mg tablet (Taking) Take 1 Tablet by mouth daily. hydroCHLOROthiazide (HYDRODIURIL) 25 mg tablet (Taking) Take 1 Tablet by mouth daily. amLODIPine (NORVASC) 5 mg tablet (Taking) Take 1 Tablet by mouth daily. nitroglycerin (NITROSTAT) 0.4 mg SL tablet (Taking) 1 Tab by SubLINGual route every five (5) minutes as needed for Chest Pain (up to 3 doses). Up to 3 doses. aspirin delayed-release 81 mg tablet (Taking) Take 1 Tab by mouth daily. BUN   Date Value Ref Range Status   01/04/2022 13 7.0 - 18 MG/DL Final     Creatinine   Date Value Ref Range Status   01/04/2022 0.79 0.6 - 1.3 MG/DL Final     GFR est AA   Date Value Ref Range Status   01/04/2022 >60 >60 ml/min/1.73m2 Final     Potassium   Date Value Ref Range Status   01/04/2022 4.0 3.5 - 5.5 mmol/L Final       HLD:  Has been compliant with meds  Yes  Compliant with low-fat diet. most of the time    Denies myalgias or other side effects. yes  The ASCVD Risk score (Aundra Denver., et al., 2013) failed to calculate for the following reasons:     The valid total cholesterol range is 130 to 320 mg/dL    Cholesterol, total   Date Value Ref Range Status   01/04/2022 104 <200 MG/DL Final     Triglyceride   Date Value Ref Range Status   01/04/2022 119 <150 MG/DL Final     Comment:     The drugs N-acetylcysteine (NAC) and  Metamiszole have been found to cause falsely  low results in this chemical assay. Please  be sure to submit blood samples obtained  BEFORE administration of either of these  drugs to assure correct results. HDL Cholesterol   Date Value Ref Range Status   01/04/2022 52 40 - 60 MG/DL Final     LDL, calculated   Date Value Ref Range Status   01/04/2022 28.2 0 - 100 MG/DL Final   ]  Key Antihyperlipidemia Meds             simvastatin (ZOCOR) 10 mg tablet (Taking) TAKE 1 TABLET BY MOUTH AT  NIGHT           Prior to Admission medications    Medication Sig Start Date End Date Taking? Authorizing Provider   metFORMIN ER (GLUCOPHAGE XR) 500 mg tablet Take 1 Tablet by mouth daily (with dinner). 1/11/22  Yes Melba Dong NP   fluconazole (DIFLUCAN) 150 mg tablet Take 1 Tablet by mouth daily for 1 day. FDA advises cautious prescribing of oral fluconazole in pregnancy. 1/11/22 1/12/22 Yes Melba Dong NP   cyclobenzaprine (FLEXERIL) 5 mg tablet Take 1 Tablet by mouth three (3) times daily as needed for Muscle Spasm(s). 9/1/21  Yes Melba Dong NP   metoprolol succinate (TOPROL-XL) 25 mg XL tablet TAKE 1 TABLET BY MOUTH  DAILY 8/25/21  Yes Melba Dong NP   simvastatin (ZOCOR) 10 mg tablet TAKE 1 TABLET BY MOUTH AT  NIGHT 5/27/21  Yes Melba Dong NP   lisinopriL (PRINIVIL, ZESTRIL) 40 mg tablet Take 1 Tablet by mouth daily. 5/27/21  Yes Melba Dong NP   hydroCHLOROthiazide (HYDRODIURIL) 25 mg tablet Take 1 Tablet by mouth daily. 5/27/21  Yes Melba Dong NP   amLODIPine (NORVASC) 5 mg tablet Take 1 Tablet by mouth daily. 5/27/21  Yes Melba Dong NP   fluticasone propionate (FLONASE) 50 mcg/actuation nasal spray Two sprays each nostril daily.  Use for at least two weeks at a time to control symptoms. 6/18/20  Yes July Pulliam MD   nitroglycerin (NITROSTAT) 0.4 mg SL tablet 1 Tab by SubLINGual route every five (5) minutes as needed for Chest Pain (up to 3 doses). Up to 3 doses. 9/19/19  Yes July Pulliam MD   aspirin delayed-release 81 mg tablet Take 1 Tab by mouth daily. 12/30/16  Yes July Pulliam MD   multivitamin (ONE A DAY) tablet Take 1 Tab by mouth daily. Yes Provider, Historical   empagliflozin (Jardiance) 10 mg tablet Take 1 Tablet by mouth daily. 11/4/21 1/11/22  Zhane Patino NP   omeprazole (PRILOSEC) 20 mg capsule TAKE 1 CAPSULE BY MOUTH  DAILY 11/1/21   Zhane Patino NP   methylPREDNISolone (MEDROL DOSEPACK) 4 mg tablet Take as instructed on package 9/1/21 1/11/22  Zhane Patino NP     Allergies   Allergen Reactions    Amoxicillin Anaphylaxis       Patient Active Problem List   Diagnosis Code    Diastolic dysfunction S16.40    History of hysterectomy Z90.710    Essential hypertension I10    Hyperlipidemia E78.5    Baker's cyst of knee M71.20    Severe obesity (BMI 35.0-39. 9) E66.01    Controlled type 2 diabetes mellitus without complication, without long-term current use of insulin (HCC) E11.9    Gastroesophageal reflux disease K21.9     Past Surgical History:   Procedure Laterality Date    HX PARTIAL HYSTERECTOMY      OR CHEST SURGERY PROCEDURE UNLISTED       Family History   Problem Relation Age of Onset    Hypertension Mother     Diabetes Father      Social History     Tobacco Use    Smoking status: Never Smoker    Smokeless tobacco: Never Used   Substance Use Topics    Alcohol use: No       ROS  As stated in HPI, otherwise all others negative. Objective: There were no vitals taken for this visit.    General: alert, cooperative, no distress   Mental  status: normal mood, behavior, speech, dress, motor activity, and thought processes, able to follow commands   HENT: NCAT   Neck: no visualized mass   Resp: no respiratory distress   Neuro: no gross deficits   Skin: no discoloration or lesions of concern on visible areas   Psychiatric: normal affect, consistent with stated mood, no evidence of hallucinations     Additional exam findings: We discussed the expected course, resolution and complications of the diagnosis(es) in detail. Medication risks, benefits, costs, interactions, and alternatives were discussed as indicated. I advised her to contact the office if her condition worsens, changes or fails to improve as anticipated. She expressed understanding with the diagnosis(es) and plan. Dami Burrell is a 61 y.o. female who was evaluated by a video visit encounter for concerns as above. Patient identification was verified prior to start of the visit. A caregiver was present when appropriate. Due to this being a TeleHealth encounter (During [facility]-32 public health emergency), evaluation of the following organ systems was limited: Vitals/Constitutional/EENT/Resp/CV/GI//MS/Neuro/Skin/Heme-Lymph-Imm. Pursuant to the emergency declaration under the [facility], [facility] waiver authority and the wripl and [employer] Act, this Virtual  Visit was conducted, with patient's (and/or legal guardian's) consent, to reduce the patient's risk of exposure to COVID-19 and provide necessary medical care. Services were provided through a video synchronous discussion virtually to substitute for in-person clinic visit. Patient and provider were located at their individual homes. An After Visit Summary was printed and given to the patient. All diagnosis have been discussed with the patient and all of the patient's questions have been answered. Follow-up and Dispositions    · Return in about 4 months (around 5/11/2022) for DM, HLD, HTN, 15 min, office only, with, labs prior.          Tong Borden, [place]-BC  [facility]  [facility] [place]   [place]ara [place]

## 2022-03-18 PROBLEM — E11.9 CONTROLLED TYPE 2 DIABETES MELLITUS WITHOUT COMPLICATION, WITHOUT LONG-TERM CURRENT USE OF INSULIN (HCC): Status: ACTIVE | Noted: 2021-02-24

## 2022-03-19 PROBLEM — M71.20 BAKER'S CYST OF KNEE: Status: ACTIVE | Noted: 2017-05-23

## 2022-03-20 PROBLEM — K21.9 GASTROESOPHAGEAL REFLUX DISEASE: Status: ACTIVE | Noted: 2021-05-27

## 2022-04-12 ENCOUNTER — HOSPITAL ENCOUNTER (OUTPATIENT)
Dept: LAB | Age: 67
Discharge: HOME | End: 2022-04-12
Payer: COMMERCIAL

## 2022-04-12 LAB
ALBUMIN SERPL-MCNC: 4.3 G/DL (ref 3.4–5)
ALBUMIN/GLOB SERPL: 1.4 {RATIO} (ref 0.8–1.7)
ALP SERPL-CCNC: 54 U/L (ref 45–117)
ALT SERPL-CCNC: 24 U/L (ref 13–56)
ANION GAP SERPL CALC-SCNC: 6 MMOL/L (ref 3–18)
AST SERPL-CCNC: 11 U/L (ref 10–38)
BILIRUB SERPL-MCNC: 0.6 MG/DL (ref 0.2–1)
BUN SERPL-MCNC: 16 MG/DL (ref 7–18)
BUN/CREAT SERPL: 19 (ref 12–20)
CALCIUM SERPL-MCNC: 9.8 MG/DL (ref 8.5–10.1)
CHLORIDE SERPL-SCNC: 106 MMOL/L (ref 100–111)
CHOLEST SERPL-MCNC: 117 MG/DL
CO2 SERPL-SCNC: 29 MMOL/L (ref 21–32)
CREAT SERPL-MCNC: 0.84 MG/DL (ref 0.6–1.3)
EST. AVERAGE GLUCOSE BLD GHB EST-MCNC: 123 MG/DL
GLOBULIN SER CALC-MCNC: 3 G/DL (ref 2–4)
GLUCOSE SERPL-MCNC: 103 MG/DL (ref 74–99)
HBA1C MFR BLD: 5.9 % (ref 4.2–5.6)
HDLC SERPL-MCNC: 58 MG/DL (ref 40–60)
HDLC SERPL: 2 {RATIO} (ref 0–5)
LDLC SERPL CALC-MCNC: 34.2 MG/DL (ref 0–100)
LIPID PROFILE,FLP: NORMAL
POTASSIUM SERPL-SCNC: 3.5 MMOL/L (ref 3.5–5.5)
PROT SERPL-MCNC: 7.3 G/DL (ref 6.4–8.2)
SODIUM SERPL-SCNC: 141 MMOL/L (ref 136–145)
TRIGL SERPL-MCNC: 124 MG/DL (ref ?–150)
VLDLC SERPL CALC-MCNC: 24.8 MG/DL

## 2022-04-12 PROCEDURE — 80053 COMPREHEN METABOLIC PANEL: CPT

## 2022-04-12 PROCEDURE — 80061 LIPID PANEL: CPT

## 2022-04-12 PROCEDURE — 83036 HEMOGLOBIN GLYCOSYLATED A1C: CPT

## 2022-04-12 PROCEDURE — 36415 COLL VENOUS BLD VENIPUNCTURE: CPT

## 2022-04-18 ENCOUNTER — OFFICE VISIT (OUTPATIENT)
Dept: FAMILY MEDICINE CLINIC | Age: 67
End: 2022-04-18
Payer: COMMERCIAL

## 2022-04-18 DIAGNOSIS — M46.1 SACROILIITIS (HCC): ICD-10-CM

## 2022-04-18 DIAGNOSIS — I10 ESSENTIAL HYPERTENSION: ICD-10-CM

## 2022-04-18 DIAGNOSIS — E11.9 CONTROLLED TYPE 2 DIABETES MELLITUS WITHOUT COMPLICATION, WITHOUT LONG-TERM CURRENT USE OF INSULIN (HCC): Primary | ICD-10-CM

## 2022-04-18 DIAGNOSIS — Z12.11 COLON CANCER SCREENING: ICD-10-CM

## 2022-04-18 DIAGNOSIS — E78.2 MIXED HYPERLIPIDEMIA: ICD-10-CM

## 2022-04-18 DIAGNOSIS — M54.32 SCIATICA OF LEFT SIDE: ICD-10-CM

## 2022-04-18 PROCEDURE — 3044F HG A1C LEVEL LT 7.0%: CPT | Performed by: NURSE PRACTITIONER

## 2022-04-18 PROCEDURE — 99214 OFFICE O/P EST MOD 30 MIN: CPT | Performed by: NURSE PRACTITIONER

## 2022-04-18 RX ORDER — CYCLOBENZAPRINE HCL 5 MG
5 TABLET ORAL
Qty: 30 TABLET | Refills: 0 | Status: SHIPPED | OUTPATIENT
Start: 2022-04-18 | End: 2022-07-28

## 2022-04-18 RX ORDER — METHYLPREDNISOLONE 4 MG/1
TABLET ORAL
Qty: 1 DOSE PACK | Refills: 0 | Status: SHIPPED | OUTPATIENT
Start: 2022-04-18 | End: 2022-07-28

## 2022-04-18 NOTE — PROGRESS NOTES
Room 8    Patient states I would like to discuss medications . Did patient bring someone? No    Did the patient have DME equipment? No     Did you take your medication today? Yes       1. \"Have you been to the ER, urgent care clinic since your last visit? Hospitalized since your last visit? \" Yes Patient states I went to Urgent Care for syatic nerve     2. \"Have you seen or consulted any other health care providers outside of the [address] [place] [address] since your last visit? \" No      3. For patients aged 39-70: Has the patient had a colonoscopy / FIT/ Cologuard? No Patient states I had screening done back in 2017 at [facility] . Patient colonoscopy was not found patient did not want to obtain an coloscopy. If the patient is female:    4. For patients aged 41-77: Has the patient had a mammogram within the past 2 years? Yes - no Care Gap present      5. For patients aged 21-65: Has the patient had a pap smear?  Yes - no Care Gap present        3 most recent PHQ Screens 4/18/2022   Little interest or pleasure in doing things Not at all   Feeling down, depressed, irritable, or hopeless Not at all   Total Score PHQ 2 0         Health Maintenance Due   Topic Date Due    Foot Exam Q1  02/25/2016    Eye Exam Retinal or Dilated  10/07/2016    Pneumococcal 0-64 years (2 - PCV) 08/07/2019    Colorectal Cancer Screening Combo  08/27/2021       Learning Assessment 6/4/2015   PRIMARY LEARNER Patient   HIGHEST LEVEL OF EDUCATION - PRIMARY LEARNER  SOME COLLEGE   BARRIERS PRIMARY LEARNER -   PRIMARY LANGUAGE ENGLISH   LEARNER PREFERENCE PRIMARY DEMONSTRATION   ANSWERED BY self   RELATIONSHIP SELF

## 2022-04-18 NOTE — PROGRESS NOTES
Rach               [facility] [address]               [phone]      Veronica Dominguez is a 61 y.o. female and presents with Follow Up Chronic Condition and Diabetes       Assessment/Plan:    Diagnoses and all orders for this visit:    1. Controlled type 2 diabetes mellitus without complication, without long-term current use of insulin (HCC)  -     HEMOGLOBIN A1C WITH EAG; Future  Endorses medication compliance, Follow up labs prior to next visit, Denies signs and symptoms of hyper or hypoglycemia and Diabetes controlled, A1C <7   2. Essential hypertension  -     METABOLIC PANEL, COMPREHENSIVE; Future  Endorses medication compliance, Follow up labs prior to next visit and Blood pressure stable, continue metoprolol, lisinopril, hctz and amlodipine at same dose   3. Mixed hyperlipidemia  -     LIPID PANEL; Future  Endorses medication compliance, Follow up labs prior to next visit and Denies abdominal pain or symptoms of myalgia   4. Sacroiliitis (HCC)  -     cyclobenzaprine (FLEXERIL) 5 mg tablet; Take 1 Tablet by mouth three (3) times daily as needed for Muscle Spasm(s). -     methylPREDNISolone (MEDROL DOSEPACK) 4 mg tablet; Take as instructed on package  -     REFERRAL TO PHYSICIAL MEDICINE REHAB  Sacral pain and sciatica continues  RX provided for muscle relaxer and course of steroids which have helped before, she understand I cannot prescribe her steroids again for this problem  referal to ortho placed, stressed importance of follow up   5. Sciatica of left side  -     cyclobenzaprine (FLEXERIL) 5 mg tablet; Take 1 Tablet by mouth three (3) times daily as needed for Muscle Spasm(s). -     methylPREDNISolone (MEDROL DOSEPACK) 4 mg tablet; Take as instructed on package  -     REFERRAL TO PHYSICIAL MEDICINE REHAB    6.  Colon cancer screening  -     COLOGUARD TEST (FECAL DNA COLORECTAL CANCER SCREENING)  Health maintenance need addressed      Follow-up and Dispositions    · Return in about 4 months (around 8/18/2022) for DM, DM foot, HLD, HTN, 30 min, office only, with, labs prior. Health Maintenance:   Health Maintenance   Topic Date Due    Foot Exam Q1  02/25/2016    Eye Exam Retinal or Dilated  10/07/2016    Pneumococcal 0-64 years (2 - PCV) 08/07/2019    Colorectal Cancer Screening Combo  08/27/2021    MICROALBUMIN Q1  08/24/2022    Flu Vaccine (Season Ended) 09/01/2022    A1C test (Diabetic or Prediabetic)  04/12/2023    Lipid Screen  04/12/2023    Depression Screen  04/18/2023    Breast Cancer Screen Mammogram  08/16/2023    DTaP/Tdap/Td series (2 - Td or Tdap) 11/12/2024    Hepatitis C Screening  Completed    Shingrix Vaccine Age 50>  Completed    COVID-19 Vaccine  Completed        Subjective:    Labs obtained prior to visit? YES  Reviewed with patient? Yes    Back pain  Pain persists with sciatica  Never did go to spine specialist  Went to urgent care 3/19/22, had x-ray completed and was given toradol 60mg, Rx for motrin and robaxin given  Pain persists  The pain is constant  The pain goes from left lower back down her left leg  Treatments in the past include steroid dose pack: this helped      DMII-   Patient reports medication compliance Daily  Diabetic diet compliance most of the time  Patient monitors blood sugars regularly does not have a glucometer   Reports am fasting sugars range does not check   Denies hypoglycemic episodes yes  Denies polyuria, polydipsia, paraesthesia, vision changes? yes  Engaging in daily exercise?  No     Diabetic Foot and Eye Exam HM Status   Topic Date Due    Diabetic Foot Care  02/25/2016    Eye Exam  10/07/2016     Hemoglobin A1c   Date Value Ref Range Status   04/12/2022 5.9 (H) 4.2 - 5.6 % Final     Comment:     (NOTE)  HbA1C Interpretive Ranges  <5.7              Normal  5.7 - 6.4         Consider Prediabetes  >6.5              Consider Diabetes     ]  Creatinine, urine   Date Value Ref Range Status   08/24/2021 88.00 30 - 125 mg/dL Final     Microalbumin/Creat ratio (mg/g creat)   Date Value Ref Range Status   2021 17 0 - 30 mg/g Final   2021 7 0 - 30 mg/g Final     Microalb/Creat ratio (ug/mg creat.)   Date Value Ref Range Status   2019 4.2 0.0 - 30.0 mg/g creat Final     Comment:                          Normal:                0.0 -  30.0                       Albuminuria:          31.0 - 300.0                       Clinical albuminuria:       >300.0     2017 5.6 0.0 - 30.0 mg/g creat Final   2014 3.6 0.0 - 30.0 mg/g creat Final     Comment:     Performed At: [address] [facility] [address]  [id number]  Kaila Banks MD  [id number]     Key Antihyperglycemic Medications             metFORMIN ER (GLUCOPHAGE XR) 500 mg tablet (Taking) Take 1 Tablet by mouth daily (with dinner). Hypertension:  Visit Vitals  BP (!) 149/80 (BP 1 Location: Right arm, BP Patient Position: Sitting, BP Cuff Size: Adult) Comment (BP Patient Position): feet flat on floor   Pulse 69   Temp 98.3 °F (36.8 °C) (Temporal)   Resp 20   Ht 5' 4\" (1.626 m)   Wt 205 lb 12.8 oz (93.4 kg)   SpO2 95%   BMI 35.33 kg/m²      Patient reports taking medications as instructed. yes   Medication side effects noted. no  Headache upon wakening. no   Home BP monitorin/80  Do you experience chest pain/pressure or SOB with exertion? no  Maintain a low Sodium diet? yes  Key CAD CHF Meds             metoprolol succinate (TOPROL-XL) 25 mg XL tablet (Taking) TAKE 1 TABLET BY MOUTH  DAILY    simvastatin (ZOCOR) 10 mg tablet (Taking) TAKE 1 TABLET BY MOUTH AT  NIGHT    lisinopriL (PRINIVIL, ZESTRIL) 40 mg tablet (Taking) Take 1 Tablet by mouth daily. hydroCHLOROthiazide (HYDRODIURIL) 25 mg tablet (Taking) Take 1 Tablet by mouth daily. amLODIPine (NORVASC) 5 mg tablet (Taking) Take 1 Tablet by mouth daily.     nitroglycerin (NITROSTAT) 0.4 mg SL tablet (Taking) 1 Tab by SubLINGual route every five (5) minutes as needed for Chest Pain (up to 3 doses). Up to 3 doses. aspirin delayed-release 81 mg tablet (Taking) Take 1 Tab by mouth daily. BUN   Date Value Ref Range Status   04/12/2022 16 7.0 - 18 MG/DL Final     Creatinine   Date Value Ref Range Status   04/12/2022 0.84 0.6 - 1.3 MG/DL Final     GFR est AA   Date Value Ref Range Status   04/12/2022 >60 >60 ml/min/1.73m2 Final     Potassium   Date Value Ref Range Status   04/12/2022 3.5 3.5 - 5.5 mmol/L Final       HLD:  Has been compliant with meds  Yes  Compliant with low-fat diet. most of the time    Denies myalgias or other side effects. yes  The ASCVD Risk score (Jesus Escoabr, et al., 2013) failed to calculate for the following reasons: The valid total cholesterol range is 130 to 320 mg/dL    Cholesterol, total   Date Value Ref Range Status   04/12/2022 117 <200 MG/DL Final     Triglyceride   Date Value Ref Range Status   04/12/2022 124 <150 MG/DL Final     Comment:     The drugs N-acetylcysteine (NAC) and  Metamiszole have been found to cause falsely  low results in this chemical assay. Please  be sure to submit blood samples obtained  BEFORE administration of either of these  drugs to assure correct results. HDL Cholesterol   Date Value Ref Range Status   04/12/2022 58 40 - 60 MG/DL Final     LDL, calculated   Date Value Ref Range Status   04/12/2022 34.2 0 - 100 MG/DL Final   ]  Key Antihyperlipidemia Meds             simvastatin (ZOCOR) 10 mg tablet (Taking) TAKE 1 TABLET BY MOUTH AT  NIGHT           ROS:     ROS  As stated in HPI, otherwise all others negative. The problem list was updated as a part of today's visit. Patient Active Problem List   Diagnosis Code    Diastolic dysfunction K75.24    History of hysterectomy Z90.710    Essential hypertension I10    Hyperlipidemia E78.5    Baker's cyst of knee M71.20    Severe obesity (BMI 35.0-39. 9) E66.01    Controlled type 2 diabetes mellitus without complication, without long-term current use of insulin (HCC) E11.9    Gastroesophageal reflux disease K21.9       The PSH, FH were reviewed. SH:  Social History     Tobacco Use    Smoking status: Never Smoker    Smokeless tobacco: Never Used   Vaping Use    Vaping Use: Never used   Substance Use Topics    Alcohol use: No    Drug use: No       Medications/Allergies:  Current Outpatient Medications on File Prior to Visit   Medication Sig Dispense Refill    metFORMIN ER (GLUCOPHAGE XR) 500 mg tablet Take 1 Tablet by mouth daily (with dinner). 90 Tablet 3    omeprazole (PRILOSEC) 20 mg capsule TAKE 1 CAPSULE BY MOUTH  DAILY 90 Capsule 3    metoprolol succinate (TOPROL-XL) 25 mg XL tablet TAKE 1 TABLET BY MOUTH  DAILY 90 Tablet 3    simvastatin (ZOCOR) 10 mg tablet TAKE 1 TABLET BY MOUTH AT  NIGHT 90 Tablet 3    lisinopriL (PRINIVIL, ZESTRIL) 40 mg tablet Take 1 Tablet by mouth daily. 90 Tablet 3    hydroCHLOROthiazide (HYDRODIURIL) 25 mg tablet Take 1 Tablet by mouth daily. 90 Tablet 3    amLODIPine (NORVASC) 5 mg tablet Take 1 Tablet by mouth daily. 90 Tablet 3    nitroglycerin (NITROSTAT) 0.4 mg SL tablet 1 Tab by SubLINGual route every five (5) minutes as needed for Chest Pain (up to 3 doses). Up to 3 doses. 25 Tab 1    aspirin delayed-release 81 mg tablet Take 1 Tab by mouth daily. 1 Tab 0    multivitamin (ONE A DAY) tablet Take 1 Tab by mouth daily.  fluticasone propionate (FLONASE) 50 mcg/actuation nasal spray Two sprays each nostril daily. Use for at least two weeks at a time to control symptoms. (Patient not taking: Reported on 4/18/2022) 1 Bottle 3     No current facility-administered medications on file prior to visit.         Allergies   Allergen Reactions    Amoxicillin Anaphylaxis       Objective:  Visit Vitals  BP (!) 149/80 (BP 1 Location: Right arm, BP Patient Position: Sitting, BP Cuff Size: Adult) Comment (BP Patient Position): feet flat on floor   Pulse 69   Temp 98.3 °F (36.8 °C) (Temporal)   Resp 20   Ht 5' 4\" (1.626 m) Wt 205 lb 12.8 oz (93.4 kg)   SpO2 95%   BMI 35.33 kg/m²    Body mass index is 35.33 kg/m². Physical assessment  Physical Exam  Vitals and nursing note reviewed. Eyes:      Conjunctiva/sclera: Conjunctivae normal.      Pupils: Pupils are equal, round, and reactive to light. Cardiovascular:      Rate and Rhythm: Normal rate and regular rhythm. Heart sounds: Normal heart sounds. No murmur heard. No friction rub. No gallop. Pulmonary:      Effort: Pulmonary effort is normal.      Breath sounds: Normal breath sounds. Musculoskeletal:         General: Normal range of motion. Cervical back: Normal range of motion. Skin:     General: Skin is warm and dry. Neurological:      Mental Status: She is alert. Labwork and Ancillary Studies:    CBC w/Diff  Lab Results   Component Value Date/Time    WBC 9.9 08/24/2021 08:20 AM    HGB 12.2 08/24/2021 08:20 AM    PLATELET 810 41/75/4047 08:20 AM         Basic Metabolic Profile  Lab Results   Component Value Date/Time    Sodium 141 04/12/2022 08:26 AM    Potassium 3.5 04/12/2022 08:26 AM    Chloride 106 04/12/2022 08:26 AM    CO2 29 04/12/2022 08:26 AM    Anion gap 6 04/12/2022 08:26 AM    Glucose 103 (H) 04/12/2022 08:26 AM    BUN 16 04/12/2022 08:26 AM    Creatinine 0.84 04/12/2022 08:26 AM    BUN/Creatinine ratio 19 04/12/2022 08:26 AM    GFR est AA >60 04/12/2022 08:26 AM    GFR est non-AA >60 04/12/2022 08:26 AM    Calcium 9.8 04/12/2022 08:26 AM        Cholesterol  Lab Results   Component Value Date/Time    Cholesterol, total 117 04/12/2022 08:26 AM    HDL Cholesterol 58 04/12/2022 08:26 AM    LDL, calculated 34.2 04/12/2022 08:26 AM    Triglyceride 124 04/12/2022 08:26 AM    CHOL/HDL Ratio 2.0 04/12/2022 08:26 AM           I have discussed the diagnosis with the patient and the intended plan as seen in the above orders. The patient has received an After-Visit Summary and questions were answered concerning future plans.      An After Visit Summary was printed and given to the patient. All diagnosis have been discussed with the patient and all of the patient's questions have been answered. Follow-up and Dispositions    · Return in about 4 months (around 8/18/2022) for DM, DM foot, HLD, HTN, 30 min, office only, with, labs prior. Ember Bradford, AGNP-BC  [address]  [address]

## 2022-04-27 VITALS
WEIGHT: 205.8 LBS | DIASTOLIC BLOOD PRESSURE: 76 MMHG | TEMPERATURE: 98.3 F | HEIGHT: 64 IN | BODY MASS INDEX: 35.13 KG/M2 | OXYGEN SATURATION: 95 % | SYSTOLIC BLOOD PRESSURE: 136 MMHG | RESPIRATION RATE: 20 BRPM | HEART RATE: 69 BPM

## 2022-07-15 DIAGNOSIS — E78.2 MIXED HYPERLIPIDEMIA: ICD-10-CM

## 2022-07-15 DIAGNOSIS — I10 ESSENTIAL HYPERTENSION: ICD-10-CM

## 2022-07-15 RX ORDER — SIMVASTATIN 10 MG/1
TABLET, FILM COATED ORAL
Qty: 90 TABLET | Refills: 3 | Status: SHIPPED | OUTPATIENT
Start: 2022-07-15

## 2022-07-15 RX ORDER — AMLODIPINE BESYLATE 5 MG/1
TABLET ORAL
Qty: 90 TABLET | Refills: 3 | Status: SHIPPED | OUTPATIENT
Start: 2022-07-15

## 2022-07-15 RX ORDER — HYDROCHLOROTHIAZIDE 25 MG/1
25 TABLET ORAL DAILY
Qty: 90 TABLET | Refills: 3 | Status: SHIPPED | OUTPATIENT
Start: 2022-07-15

## 2022-07-15 RX ORDER — LISINOPRIL 40 MG/1
40 TABLET ORAL DAILY
Qty: 90 TABLET | Refills: 3 | Status: SHIPPED | OUTPATIENT
Start: 2022-07-15

## 2022-07-28 ENCOUNTER — OFFICE VISIT (OUTPATIENT)
Dept: ORTHOPEDIC SURGERY | Age: 67
End: 2022-07-28
Payer: COMMERCIAL

## 2022-07-28 DIAGNOSIS — M54.16 LEFT LUMBAR RADICULOPATHY: Primary | ICD-10-CM

## 2022-07-28 PROCEDURE — 99204 OFFICE O/P NEW MOD 45 MIN: CPT | Performed by: PHYSICAL MEDICINE & REHABILITATION

## 2022-07-28 RX ORDER — GABAPENTIN 100 MG/1
100 CAPSULE ORAL
Qty: 30 CAPSULE | Refills: 0 | Status: SHIPPED | OUTPATIENT
Start: 2022-07-28 | End: 2022-08-25

## 2022-07-28 RX ORDER — MELOXICAM 15 MG/1
15 TABLET ORAL
Qty: 30 TABLET | Refills: 0 | Status: SHIPPED | OUTPATIENT
Start: 2022-07-28 | End: 2022-08-27
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
## 2022-08-11 ENCOUNTER — HOSPITAL ENCOUNTER (OUTPATIENT)
Dept: LAB | Age: 67
Discharge: HOME | End: 2022-08-11
Payer: COMMERCIAL

## 2022-08-11 LAB
ALBUMIN SERPL-MCNC: 4.1 G/DL (ref 3.4–5)
ALBUMIN/GLOB SERPL: 1.3 {RATIO} (ref 0.8–1.7)
ALP SERPL-CCNC: 63 U/L (ref 45–117)
ALT SERPL-CCNC: 21 U/L (ref 13–56)
ANION GAP SERPL CALC-SCNC: 5 MMOL/L (ref 3–18)
AST SERPL-CCNC: 11 U/L (ref 10–38)
BILIRUB SERPL-MCNC: 0.4 MG/DL (ref 0.2–1)
BUN SERPL-MCNC: 16 MG/DL (ref 7–18)
BUN/CREAT SERPL: 21 (ref 12–20)
CALCIUM SERPL-MCNC: 9.6 MG/DL (ref 8.5–10.1)
CHLORIDE SERPL-SCNC: 105 MMOL/L (ref 100–111)
CHOLEST SERPL-MCNC: 107 MG/DL
CO2 SERPL-SCNC: 30 MMOL/L (ref 21–32)
CREAT SERPL-MCNC: 0.76 MG/DL (ref 0.6–1.3)
EST. AVERAGE GLUCOSE BLD GHB EST-MCNC: 131 MG/DL
GLOBULIN SER CALC-MCNC: 3.2 G/DL (ref 2–4)
GLUCOSE SERPL-MCNC: 94 MG/DL (ref 74–99)
HBA1C MFR BLD: 6.2 % (ref 4.2–5.6)
HDLC SERPL-MCNC: 64 MG/DL (ref 40–60)
HDLC SERPL: 1.7 {RATIO} (ref 0–5)
LDLC SERPL CALC-MCNC: 22.8 MG/DL (ref 0–100)
LIPID PROFILE,FLP: ABNORMAL
POTASSIUM SERPL-SCNC: 3.6 MMOL/L (ref 3.5–5.5)
PROT SERPL-MCNC: 7.3 G/DL (ref 6.4–8.2)
SODIUM SERPL-SCNC: 140 MMOL/L (ref 136–145)
TRIGL SERPL-MCNC: 101 MG/DL (ref ?–150)
VLDLC SERPL CALC-MCNC: 20.2 MG/DL

## 2022-08-11 PROCEDURE — 83036 HEMOGLOBIN GLYCOSYLATED A1C: CPT

## 2022-08-11 PROCEDURE — 80053 COMPREHEN METABOLIC PANEL: CPT

## 2022-08-11 PROCEDURE — 36415 COLL VENOUS BLD VENIPUNCTURE: CPT

## 2022-08-11 PROCEDURE — 80061 LIPID PANEL: CPT

## 2022-08-18 ENCOUNTER — OFFICE VISIT (OUTPATIENT)
Dept: FAMILY MEDICINE CLINIC | Age: 67
End: 2022-08-18
Payer: COMMERCIAL

## 2022-08-18 DIAGNOSIS — E78.2 MIXED HYPERLIPIDEMIA: ICD-10-CM

## 2022-08-18 DIAGNOSIS — Z23 ENCOUNTER FOR IMMUNIZATION: ICD-10-CM

## 2022-08-18 DIAGNOSIS — I10 ESSENTIAL HYPERTENSION: ICD-10-CM

## 2022-08-18 DIAGNOSIS — E11.9 CONTROLLED TYPE 2 DIABETES MELLITUS WITHOUT COMPLICATION, WITHOUT LONG-TERM CURRENT USE OF INSULIN (HCC): Primary | ICD-10-CM

## 2022-08-18 DIAGNOSIS — N89.8 VAGINAL LESION: ICD-10-CM

## 2022-08-18 DIAGNOSIS — N23 URINARY TRACT PAIN: ICD-10-CM

## 2022-08-18 LAB
BILIRUB UR QL STRIP: NEGATIVE
GLUCOSE UR-MCNC: NEGATIVE MG/DL
KETONES P FAST UR STRIP-MCNC: NEGATIVE MG/DL
PH UR STRIP: 7 [PH] (ref 4.6–8)
PROT UR QL STRIP: NEGATIVE
SP GR UR STRIP: 1.02 (ref 1–1.03)
UA UROBILINOGEN AMB POC: NORMAL (ref 0.2–1)
URINALYSIS CLARITY POC: CLEAR
URINALYSIS COLOR POC: YELLOW
URINE BLOOD POC: NEGATIVE
URINE LEUKOCYTES POC: NEGATIVE
URINE NITRITES POC: NEGATIVE

## 2022-08-18 PROCEDURE — 90677 PCV20 VACCINE IM: CPT | Performed by: NURSE PRACTITIONER

## 2022-08-18 PROCEDURE — 3044F HG A1C LEVEL LT 7.0%: CPT | Performed by: NURSE PRACTITIONER

## 2022-08-18 PROCEDURE — 81003 URINALYSIS AUTO W/O SCOPE: CPT | Performed by: NURSE PRACTITIONER

## 2022-08-18 PROCEDURE — 90471 IMMUNIZATION ADMIN: CPT | Performed by: NURSE PRACTITIONER

## 2022-08-18 PROCEDURE — 99214 OFFICE O/P EST MOD 30 MIN: CPT | Performed by: NURSE PRACTITIONER

## 2022-08-18 RX ORDER — VALACYCLOVIR HYDROCHLORIDE 1 G/1
1000 TABLET, FILM COATED ORAL 2 TIMES DAILY
Qty: 14 TABLET | Refills: 0 | Status: SHIPPED | OUTPATIENT
Start: 2022-08-18 | End: 2022-08-22

## 2022-08-18 NOTE — PROGRESS NOTES
Room 8    Patient was given VIS for review, consent was obtained and per orders of NP. Li Ferrara , injection of Prevnar 20  given by Shayy Perez. Patient observed. No signs nor symptoms of any adverse reactions. Patient tolerated injection well. When asked if patient has any concerns he would like to address with FATOU Wilkes patient states yes, I think I have a bladder infection or a yeast infection . Did patient bring someone? No    Did the patient have DME equipment? No     Did you take your medication today? Yes       1. \"Have you been to the ER, urgent care clinic since your last visit? Hospitalized since your last visit? \" No    2. \"Have you seen or consulted any other health care providers outside of the [address] [place] [address] since your last visit? \" Yes Patient states I saw a nervespecilist       3. For patients aged 39-70: Has the patient had a colonoscopy / FIT/ Cologuard? Yes - no Care Gap present      If the patient is female:    4. For patients aged 41-77: Has the patient had a mammogram within the past 2 years? Yes - no Care Gap present      5. For patients aged 21-65: Has the patient had a pap smear?  Yes - no Care Gap present        3 most recent PHQ Screens 8/18/2022   Little interest or pleasure in doing things Not at all   Feeling down, depressed, irritable, or hopeless Not at all   Total Score PHQ 2 0         Health Maintenance Due   Topic Date Due    Foot Exam Q1  02/25/2016    Eye Exam Retinal or Dilated  10/07/2016    Pneumococcal 0-64 years (2 - PCV) 08/07/2019    COVID-19 Vaccine (4 - Booster for Moderna series) 03/10/2022    MICROALBUMIN Q1  08/24/2022       Learning Assessment 6/4/2015   PRIMARY LEARNER Patient   HIGHEST LEVEL OF EDUCATION - PRIMARY LEARNER  SOME COLLEGE   BARRIERS PRIMARY LEARNER -   PRIMARY LANGUAGE ENGLISH   LEARNER PREFERENCE PRIMARY DEMONSTRATION   ANSWERED BY self   RELATIONSHIP SELF

## 2022-08-18 NOTE — PROGRESS NOTES
Rach               [address]               [phone]      Bebeto Webster is a 61 y.o. female and presents with Follow Up Chronic Condition, Cholesterol Problem, Hypertension, and Diabetes       Assessment/Plan:    Diagnoses and all orders for this visit:    1. Controlled type 2 diabetes mellitus without complication, without long-term current use of insulin (HCC)  -     HEMOGLOBIN A1C WITH EAG; Future  Endorses medication compliance, Follow up labs prior to next visit, Denies signs and symptoms of hyper or hypoglycemia, and Diabetes controlled, A1C <7     2. Essential hypertension  -     METABOLIC PANEL, COMPREHENSIVE; Future  Endorses medication compliance, Follow up labs prior to next visit, and Blood pressure uncontrolled patient in pain today, have her follow up in one week for recheck  Patient verbalized understanding and is in agreement with this plan of care     3. Mixed hyperlipidemia  -     LIPID PANEL; Future  Endorses medication compliance, Follow up labs prior to next visit, and Denies abdominal pain or symptoms of myalgia     4. Urinary tract pain  -     AMB POC URINALYSIS DIP STICK AUTO W/O MICRO  POC UA negative for UTI  5. Vaginal lesion  -     HSV 1/2 AB, IGM; Future  -     valACYclovir (VALTREX) 1 gram tablet; Take 1 Tablet by mouth two (2) times a day for 7 days. Lesions on her labia which could be HSV  Obtain labs prior to next visit: per her choice  Treat with valtrex to see if she gets relief  Patient verbalized understanding and is in agreement with this plan of care      Follow-up and Dispositions    Return in about 1 week (around 8/25/2022) for Lab, AND, 4month, DM, HLD, HTN, 15 min, office only, w/labs prior.            Health Maintenance:   Health Maintenance   Topic Date Due    Eye Exam Retinal or Dilated  10/07/2016    COVID-19 Vaccine (4 - Booster for Moderna series) 03/10/2022    MICROALBUMIN Q1  08/24/2022    Flu Vaccine (1) 09/01/2022    A1C test (Diabetic or Prediabetic)  08/11/2023    Lipid Screen  08/11/2023    Depression Screen  08/18/2023    Foot Exam Q1  08/18/2023    Breast Cancer Screen Mammogram  08/13/2024    DTaP/Tdap/Td series (2 - Td or Tdap) 11/12/2024    Colorectal Cancer Screening Combo  04/25/2025    Hepatitis C Screening  Completed    Shingrix Vaccine Age 50>  Completed    Pneumococcal 0-64 years  Completed        Subjective:    Labs obtained prior to visit? YES  Reviewed with patient? Yes    DMII-   Patient reports medication compliance Daily  Diabetic diet compliance most of the time  Patient monitors blood sugars regularly never   Reports am fasting sugars range does not check   Denies hypoglycemic episodes yes  Denies polyuria, polydipsia, paraesthesia, vision changes? yes  Engaging in daily exercise?  Yes: Comment: yoga daily and exercise routine from her insurance     Diabetic Foot and Eye Exam HM Status   Topic Date Due    Eye Exam  10/07/2016    Diabetic Foot Care  08/18/2023     Hemoglobin A1c   Date Value Ref Range Status   08/11/2022 6.2 (H) 4.2 - 5.6 % Final     Comment:     (NOTE)  HbA1C Interpretive Ranges  <5.7              Normal  5.7 - 6.4         Consider Prediabetes  >6.5              Consider Diabetes     ]  Creatinine, urine   Date Value Ref Range Status   08/24/2021 88.00 30 - 125 mg/dL Final     Microalbumin/Creat ratio (mg/g creat)   Date Value Ref Range Status   08/24/2021 17 0 - 30 mg/g Final   03/05/2021 7 0 - 30 mg/g Final     Microalb/Creat ratio (ug/mg creat.)   Date Value Ref Range Status   03/14/2019 4.2 0.0 - 30.0 mg/g creat Final     Comment:                          Normal:                0.0 -  30.0                       Albuminuria:          31.0 - 300.0                       Clinical albuminuria:       >300.0     05/23/2017 5.6 0.0 - 30.0 mg/g creat Final   09/26/2014 3.6 0.0 - 30.0 mg/g creat Final     Comment:     Performed At: [address] [id number]  Shadi Brewer MD  [id number]     Key Antihyperglycemic Medications               metFORMIN ER (GLUCOPHAGE XR) 500 mg tablet (Taking) Take 1 Tablet by mouth daily (with dinner). Hypertension:  Visit Vitals  /88   Pulse 67   Temp 98.3 °F (36.8 °C) (Temporal)   Resp 18   Ht 5' 4\" (1.626 m)   Wt 208 lb (94.3 kg)   SpO2 95%   BMI 35.70 kg/m²      Patient reports taking medications as instructed. yes   Medication side effects noted. no  Headache upon wakening. no   Home BP monitoring: Does not check  Do you experience chest pain/pressure or SOB with exertion? no  Maintain a low Sodium diet? yes  Key CAD CHF Meds               hydroCHLOROthiazide (HYDRODIURIL) 25 mg tablet (Taking) TAKE 1 TABLET BY MOUTH  DAILY    lisinopriL (PRINIVIL, ZESTRIL) 40 mg tablet (Taking) TAKE 1 TABLET BY MOUTH  DAILY    simvastatin (Zocor) 10 mg tablet (Taking) TAKE 1 TABLET BY MOUTH AT  NIGHT    metoprolol succinate (TOPROL-XL) 25 mg XL tablet (Taking) TAKE 1 TABLET BY MOUTH  DAILY    aspirin delayed-release 81 mg tablet (Taking) Take 1 Tab by mouth daily. Norvasc 5 mg tablet TAKE 1 TABLET BY MOUTH  DAILY    nitroglycerin (NITROSTAT) 0.4 mg SL tablet 1 Tab by SubLINGual route every five (5) minutes as needed for Chest Pain (up to 3 doses). Up to 3 doses. BUN   Date Value Ref Range Status   08/11/2022 16 7.0 - 18 MG/DL Final     Creatinine   Date Value Ref Range Status   08/11/2022 0.76 0.6 - 1.3 MG/DL Final     GFR est AA   Date Value Ref Range Status   08/11/2022 >60 >60 ml/min/1.73m2 Final     Potassium   Date Value Ref Range Status   08/11/2022 3.6 3.5 - 5.5 mmol/L Final         HLD:  Has been compliant with meds  Yes  Compliant with low-fat diet. most of the time    Denies myalgias or other side effects. yes  The ASCVD Risk score (Charan Peres, et al., 2013) failed to calculate for the following reasons:     The valid total cholesterol range is 130 to 320 mg/dL    Cholesterol, total   Date Value Ref Range Status   08/11/2022 107 <200 MG/DL Final     Triglyceride   Date Value Ref Range Status   08/11/2022 101 <150 MG/DL Final     Comment:     The drugs N-acetylcysteine (NAC) and  Metamiszole have been found to cause falsely  low results in this chemical assay. Please  be sure to submit blood samples obtained  BEFORE administration of either of these  drugs to assure correct results. HDL Cholesterol   Date Value Ref Range Status   08/11/2022 64 (H) 40 - 60 MG/DL Final     LDL, calculated   Date Value Ref Range Status   08/11/2022 22.8 0 - 100 MG/DL Final   ]  Key Antihyperlipidemia Meds               simvastatin (Zocor) 10 mg tablet (Taking) TAKE 1 TABLET BY MOUTH AT  NIGHT           Vaginal pimples  Long standing  Tried treating for yeast with no results  They are becoming painful  The locations are unchanged  They are there all the time  No drainage    ROS:     ROS  As stated in HPI, otherwise all others negative. The problem list was updated as a part of today's visit. Patient Active Problem List   Diagnosis Code    Diastolic dysfunction W73.24    History of hysterectomy Z90.710    Essential hypertension I10    Hyperlipidemia E78.5    Baker's cyst of knee M71.20    Severe obesity (BMI 35.0-39. 9) E66.01    Controlled type 2 diabetes mellitus without complication, without long-term current use of insulin (HCC) E11.9    Gastroesophageal reflux disease K21.9       The PSH, FH were reviewed. SH:  Social History     Tobacco Use    Smoking status: Never    Smokeless tobacco: Never   Vaping Use    Vaping Use: Never used   Substance Use Topics    Alcohol use: No    Drug use: No       Medications/Allergies:  Current Outpatient Medications on File Prior to Visit   Medication Sig Dispense Refill    meloxicam (MOBIC) 15 mg tablet Take 1 Tablet by mouth daily as needed for Pain for up to 30 days.  30 Tablet 0    hydroCHLOROthiazide (HYDRODIURIL) 25 mg tablet TAKE 1 TABLET BY MOUTH  DAILY 90 Tablet 3 lisinopriL (PRINIVIL, ZESTRIL) 40 mg tablet TAKE 1 TABLET BY MOUTH  DAILY 90 Tablet 3    simvastatin (Zocor) 10 mg tablet TAKE 1 TABLET BY MOUTH AT  NIGHT 90 Tablet 3    metFORMIN ER (GLUCOPHAGE XR) 500 mg tablet Take 1 Tablet by mouth daily (with dinner). 90 Tablet 3    omeprazole (PRILOSEC) 20 mg capsule TAKE 1 CAPSULE BY MOUTH  DAILY 90 Capsule 3    metoprolol succinate (TOPROL-XL) 25 mg XL tablet TAKE 1 TABLET BY MOUTH  DAILY 90 Tablet 3    aspirin delayed-release 81 mg tablet Take 1 Tab by mouth daily. 1 Tab 0    gabapentin (NEURONTIN) 100 mg capsule Take 1 Capsule by mouth nightly for 30 days. Max Daily Amount: 100 mg. (Patient not taking: Reported on 8/18/2022) 30 Capsule 0    Norvasc 5 mg tablet TAKE 1 TABLET BY MOUTH  DAILY (Patient not taking: Reported on 8/18/2022) 90 Tablet 3    nitroglycerin (NITROSTAT) 0.4 mg SL tablet 1 Tab by SubLINGual route every five (5) minutes as needed for Chest Pain (up to 3 doses). Up to 3 doses. (Patient not taking: No sig reported) 25 Tab 1     No current facility-administered medications on file prior to visit. Allergies   Allergen Reactions    Amoxicillin Anaphylaxis       Objective:  Visit Vitals  /88   Pulse 67   Temp 98.3 °F (36.8 °C) (Temporal)   Resp 18   Ht 5' 4\" (1.626 m)   Wt 208 lb (94.3 kg)   SpO2 95%   BMI 35.70 kg/m²    Body mass index is 35.7 kg/m². Physical assessment  Physical Exam  Vitals and nursing note reviewed. Eyes:      Conjunctiva/sclera: Conjunctivae normal.      Pupils: Pupils are equal, round, and reactive to light. Cardiovascular:      Rate and Rhythm: Normal rate and regular rhythm. Heart sounds: Normal heart sounds. No murmur heard. No friction rub. No gallop. Pulmonary:      Effort: Pulmonary effort is normal.      Breath sounds: Normal breath sounds. Genitourinary:      Musculoskeletal:         General: Normal range of motion. Cervical back: Normal range of motion.    Feet:      Comments: Diabetic foot exam:     Left: Reflexes 2+     Vibratory sensation normal    Proprioception normal   Filament test normal sensation with micro filament   Pulse DP: 2+ (normal)   Deformities: None  Right: Reflexes 2+   Vibratory sensation normal   Proprioception normal   Filament test normal sensation with micro filament   Pulse DP: 2+ (normal)   Deformities: None  Skin:     General: Skin is warm and dry. Neurological:      Mental Status: She is alert. Labwork and Ancillary Studies:    CBC w/Diff  Lab Results   Component Value Date/Time    WBC 9.9 08/24/2021 08:20 AM    HGB 12.2 08/24/2021 08:20 AM    PLATELET 421 77/38/0870 08:20 AM         Basic Metabolic Profile  Lab Results   Component Value Date/Time    Sodium 140 08/11/2022 02:57 PM    Potassium 3.6 08/11/2022 02:57 PM    Chloride 105 08/11/2022 02:57 PM    CO2 30 08/11/2022 02:57 PM    Anion gap 5 08/11/2022 02:57 PM    Glucose 94 08/11/2022 02:57 PM    BUN 16 08/11/2022 02:57 PM    Creatinine 0.76 08/11/2022 02:57 PM    BUN/Creatinine ratio 21 (H) 08/11/2022 02:57 PM    GFR est AA >60 08/11/2022 02:57 PM    GFR est non-AA >60 08/11/2022 02:57 PM    Calcium 9.6 08/11/2022 02:57 PM        Cholesterol  Lab Results   Component Value Date/Time    Cholesterol, total 107 08/11/2022 02:57 PM    HDL Cholesterol 64 (H) 08/11/2022 02:57 PM    LDL, calculated 22.8 08/11/2022 02:57 PM    Triglyceride 101 08/11/2022 02:57 PM    CHOL/HDL Ratio 1.7 08/11/2022 02:57 PM           I have discussed the diagnosis with the patient and the intended plan as seen in the above orders. The patient has received an After-Visit Summary and questions were answered concerning future plans. An After Visit Summary was printed and given to the patient. All diagnosis have been discussed with the patient and all of the patient's questions have been answered.      Follow-up and Dispositions    Return in about 1 week (around 8/25/2022) for Lab, AND, 4month, DM, HLD, HTN, 15 min, office only, w/labs prior. Anna Ortega, [facility]  [address] [facility]   [address] [facility] [address]  [id number]

## 2022-08-22 VITALS
OXYGEN SATURATION: 95 % | BODY MASS INDEX: 35.51 KG/M2 | DIASTOLIC BLOOD PRESSURE: 88 MMHG | RESPIRATION RATE: 18 BRPM | WEIGHT: 208 LBS | HEART RATE: 67 BPM | SYSTOLIC BLOOD PRESSURE: 136 MMHG | TEMPERATURE: 98.3 F | HEIGHT: 64 IN

## 2022-08-22 DIAGNOSIS — N89.8 VAGINAL LESION: ICD-10-CM

## 2022-08-22 RX ORDER — VALACYCLOVIR HYDROCHLORIDE 1 G/1
TABLET, FILM COATED ORAL
Qty: 14 TABLET | Refills: 0 | Status: SHIPPED | OUTPATIENT
Start: 2022-08-22

## 2022-08-23 DIAGNOSIS — M54.16 LEFT LUMBAR RADICULOPATHY: ICD-10-CM

## 2022-08-25 RX ORDER — MELOXICAM 15 MG/1
TABLET ORAL
Qty: 30 TABLET | Refills: 0 | OUTPATIENT
Start: 2022-08-25

## 2022-08-25 RX ORDER — GABAPENTIN 100 MG/1
CAPSULE ORAL
Qty: 30 CAPSULE | Refills: 0 | Status: SHIPPED | OUTPATIENT
Start: 2022-08-25 | End: 2022-09-24

## 2022-09-19 ENCOUNTER — TELEPHONE (OUTPATIENT)
Dept: FAMILY MEDICINE CLINIC | Age: 67
End: 2022-09-19

## 2022-09-19 DIAGNOSIS — Z87.892: Primary | ICD-10-CM

## 2022-09-19 DIAGNOSIS — R05.9 COUGH: ICD-10-CM

## 2022-09-19 RX ORDER — BENZONATATE 200 MG/1
200 CAPSULE ORAL
Qty: 90 CAPSULE | Refills: 0 | Status: SHIPPED | OUTPATIENT
Start: 2022-09-19

## 2022-09-19 NOTE — TELEPHONE ENCOUNTER
Patient tested positive for Covid on 9/16/22. Would like medication called in for sxs of chest congestion and cough. Also experiencing HA, and dizziness. Denies fever or SOB   Patient has received both vaccines and one booster.

## 2022-09-19 NOTE — TELEPHONE ENCOUNTER
Returned call  Would like something for congestion.  Has not tried anything yet   Has been taking tylenol and drinking tea  Advised she can use otc medications like mucinex for her congestion, other medication she can use are coricidan products, rx for tessalon sent   Patient verbalized understanding and is in agreement with this plan of care

## 2022-10-26 ENCOUNTER — OFFICE VISIT (OUTPATIENT)
Dept: ORTHOPEDIC SURGERY | Age: 67
End: 2022-10-26
Payer: COMMERCIAL

## 2022-10-26 DIAGNOSIS — M54.16 LEFT LUMBAR RADICULOPATHY: Primary | ICD-10-CM

## 2022-10-26 PROCEDURE — 99214 OFFICE O/P EST MOD 30 MIN: CPT | Performed by: PHYSICAL MEDICINE & REHABILITATION

## 2022-10-26 RX ORDER — GABAPENTIN 100 MG/1
100 CAPSULE ORAL 2 TIMES DAILY
Qty: 60 CAPSULE | Refills: 0 | Status: SHIPPED | OUTPATIENT
Start: 2022-10-26 | End: 2022-11-21
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
## 2022-11-21 DIAGNOSIS — I10 ESSENTIAL HYPERTENSION: ICD-10-CM

## 2022-11-21 DIAGNOSIS — M54.16 LEFT LUMBAR RADICULOPATHY: ICD-10-CM

## 2022-11-21 RX ORDER — METOPROLOL SUCCINATE 25 MG/1
TABLET, EXTENDED RELEASE ORAL
Qty: 90 TABLET | Refills: 3 | Status: SHIPPED | OUTPATIENT
Start: 2022-11-21

## 2022-11-21 RX ORDER — GABAPENTIN 100 MG/1
100 CAPSULE ORAL 2 TIMES DAILY
Qty: 120 CAPSULE | Refills: 0 | Status: SHIPPED | OUTPATIENT
Start: 2022-11-21 | End: 2023-01-20

## 2022-11-23 DIAGNOSIS — K21.9 GASTROESOPHAGEAL REFLUX DISEASE, UNSPECIFIED WHETHER ESOPHAGITIS PRESENT: ICD-10-CM

## 2022-11-23 RX ORDER — OMEPRAZOLE 20 MG/1
20 CAPSULE, DELAYED RELEASE ORAL DAILY
Qty: 90 CAPSULE | Refills: 3 | Status: SHIPPED | OUTPATIENT
Start: 2022-11-23

## 2023-01-03 ENCOUNTER — OFFICE VISIT (OUTPATIENT)
Dept: ORTHOPEDIC SURGERY | Age: 68
End: 2023-01-03
Payer: COMMERCIAL

## 2023-01-03 DIAGNOSIS — M54.16 LEFT LUMBAR RADICULOPATHY: Primary | ICD-10-CM

## 2023-01-03 PROCEDURE — 1123F ACP DISCUSS/DSCN MKR DOCD: CPT | Performed by: PHYSICAL MEDICINE & REHABILITATION

## 2023-01-03 PROCEDURE — 99214 OFFICE O/P EST MOD 30 MIN: CPT | Performed by: PHYSICAL MEDICINE & REHABILITATION

## 2023-01-03 NOTE — PATIENT INSTRUCTIONS
[facility]    Please expect an automated call within 24-48 business hours to schedule your outpatient study with Giovanni Cline    If you have not received an automated call, please call [phone] to speak directly with a     [address] at [id number]
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
## 2023-01-16 DIAGNOSIS — E11.9 CONTROLLED TYPE 2 DIABETES MELLITUS WITHOUT COMPLICATION, WITHOUT LONG-TERM CURRENT USE OF INSULIN (HCC): ICD-10-CM

## 2023-01-16 RX ORDER — METFORMIN HYDROCHLORIDE 500 MG/1
TABLET, EXTENDED RELEASE ORAL
Qty: 90 TABLET | Refills: 3 | Status: SHIPPED | OUTPATIENT
Start: 2023-01-16

## 2023-01-28 ENCOUNTER — TRANSCRIBE ORDERS (OUTPATIENT)
Facility: HOSPITAL | Age: 68
End: 2023-01-28

## 2023-01-28 DIAGNOSIS — M54.16 LEFT LUMBAR RADICULOPATHY: Primary | ICD-10-CM

## 2023-01-31 ENCOUNTER — OFFICE VISIT (OUTPATIENT)
Dept: FAMILY MEDICINE CLINIC | Age: 68
End: 2023-01-31
Payer: COMMERCIAL

## 2023-01-31 VITALS
RESPIRATION RATE: 20 BRPM | SYSTOLIC BLOOD PRESSURE: 148 MMHG | WEIGHT: 213 LBS | TEMPERATURE: 98.2 F | HEIGHT: 64 IN | OXYGEN SATURATION: 96 % | BODY MASS INDEX: 36.37 KG/M2 | DIASTOLIC BLOOD PRESSURE: 90 MMHG | HEART RATE: 63 BPM

## 2023-01-31 DIAGNOSIS — E78.2 MIXED HYPERLIPIDEMIA: ICD-10-CM

## 2023-01-31 DIAGNOSIS — E11.9 CONTROLLED TYPE 2 DIABETES MELLITUS WITHOUT COMPLICATION, WITHOUT LONG-TERM CURRENT USE OF INSULIN (HCC): Primary | ICD-10-CM

## 2023-01-31 DIAGNOSIS — I10 ESSENTIAL HYPERTENSION: ICD-10-CM

## 2023-01-31 DIAGNOSIS — N89.8 VAGINAL LESION: ICD-10-CM

## 2023-01-31 PROCEDURE — 99214 OFFICE O/P EST MOD 30 MIN: CPT | Performed by: NURSE PRACTITIONER

## 2023-01-31 PROCEDURE — 1123F ACP DISCUSS/DSCN MKR DOCD: CPT | Performed by: NURSE PRACTITIONER

## 2023-01-31 PROCEDURE — 3077F SYST BP >= 140 MM HG: CPT | Performed by: NURSE PRACTITIONER

## 2023-01-31 PROCEDURE — 3079F DIAST BP 80-89 MM HG: CPT | Performed by: NURSE PRACTITIONER

## 2023-01-31 RX ORDER — GABAPENTIN 100 MG/1
100 CAPSULE ORAL 2 TIMES DAILY
COMMUNITY
Start: 2022-11-21 | End: 2023-01-31

## 2023-01-31 RX ORDER — AMLODIPINE BESYLATE 10 MG/1
10 TABLET ORAL DAILY
Qty: 90 TABLET | Refills: 0
Start: 2023-01-31

## 2023-01-31 NOTE — ACP (ADVANCE CARE PLANNING)
Advance Care Planning     General Advance Care Planning (ACP) Conversation      Date of Conversation: 1/31/2023  Conducted with: Patient with Decision Making Capacity    Healthcare Decision Maker:   No healthcare decision makers have been documented. Click here to complete [address] including selection of the Healthcare Decision Maker Relationship (ie \"Primary\")  Today we documented Decision Maker(s) consistent with Legal Next of Kin hierarchy.     Content/Action Overview:   Has NO ACP documents/care preferences - information provided, considering goals and options  Reviewed DNR/DNI and patient elects Full Code (Attempt Resuscitation)  Topics discussed: resuscitation preferences       Length of Voluntary ACP Conversation in minutes:  <16 minutes (Non-Billable)    Nohelia Ring NP

## 2023-01-31 NOTE — PROGRESS NOTES
[facility]               Sara Green               [phone]      Aurora See is a 74 y.o. female and presents with Follow Up Chronic Condition, Diabetes, Hypertension, and Cholesterol Problem       Assessment/Plan:    Diagnoses and all orders for this visit:    1. Controlled type 2 diabetes mellitus without complication, without long-term current use of insulin (HCC)  -     HEMOGLOBIN A1C WITH EAG  Endorses medication compliance, Follow-up labs today, Denies signs and symptoms of hyper or hypoglycemia, and Diabetes controlled, A1C <7   2. Essential hypertension  -     METABOLIC PANEL, COMPREHENSIVE  -     amLODIPine (NORVASC) 10 mg tablet; Take 1 Tablet by mouth daily. Endorses medication compliance, Follow-up labs today, and Blood pressure uncontrolled increase amlodipine to 10mg a day and follow up in one month    3. Mixed hyperlipidemia  -     LIPID PANEL  Endorses medication compliance, Follow-up labs today, and Denies abdominal pain or symptoms of myalgia   4. Vaginal lesion  -     HSV 1/2 AB, IGM  -     REFERRAL TO GYNECOLOGY  Endorses more lesions on her labia, states they are non painful, check labs for HSV-due to no pain I doubt she has HSV and refer to gyn for further evaluation  Patient verbalized understanding and is in agreement with this plan of care    Follow-up and Dispositions    Return in about 4 weeks (around 2/28/2023) for HTN, 15 min, office only.            Health Maintenance:   Health Maintenance   Topic Date Due    COVID-19 Vaccine (4 - Booster for Moderna series) 01/05/2022    Flu Vaccine (1) 08/01/2022    Diabetic Alb to Cr ratio (uACR) test  08/24/2022    Bone Densitometry (Dexa) Screening  Never done    Eye Exam Retinal or Dilated  06/01/2023 (Originally 10/7/2016)    GFR test (Diabetes, CKD 3-4, OR last GFR 15-59)  08/11/2023    A1C test (Diabetic or Prediabetic)  08/11/2023    Lipid Screen  08/11/2023    Depression Screen  08/18/2023    Foot Exam Q1 08/18/2023    Breast Cancer Screen Mammogram  08/13/2024    DTaP/Tdap/Td series (2 - Td or Tdap) 11/12/2024    Colorectal Cancer Screening Combo  04/25/2025    Hepatitis C Screening  Completed    Shingles Vaccine  Completed    Pneumococcal 65+ years  Completed        Subjective:    Labs obtained prior to visit? NO  Reviewed with patient? Not applicable    Lesions on vagina  Had one in august 2022, treated with valtrex for possible hsv  Labs today for confirmation  The lesions are just hard bumps, below the skin surface, non-painful, no drainage  They are about 3mm in size  They are located on the labia      DMII-   Patient reports medication compliance Yes  Diabetic diet compliance most of the time  Patient monitors blood sugars regularly never   Home glucose readings: does not check   Denies hypoglycemic episodes yes  Denies polyuria, polydipsia, paraesthesia, vision changes? yes  Engaging in daily exercise?  Yes: Comment: 30-45 min aerobics every morning 5 days a week     Diabetic Foot and Eye Exam HM Status   Topic Date Due    Eye Exam  06/01/2023 (Originally 10/7/2016)    Diabetic Foot Care  08/18/2023     Hemoglobin A1c   Date Value Ref Range Status   08/11/2022 6.2 (H) 4.2 - 5.6 % Final     Comment:     (NOTE)  HbA1C Interpretive Ranges  <5.7              Normal  5.7 - 6.4         Consider Prediabetes  >6.5              Consider Diabetes     ]  Creatinine, urine random   Date Value Ref Range Status   08/24/2021 88.00 30 - 125 mg/dL Final     Microalbumin/Creat ratio (mg/g creat)   Date Value Ref Range Status   08/24/2021 17 0 - 30 mg/g Final   03/05/2021 7 0 - 30 mg/g Final     Microalb/Creat ratio (ug/mg creat.)   Date Value Ref Range Status   03/14/2019 4.2 0.0 - 30.0 mg/g creat Final     Comment:                          Normal:                0.0 -  30.0                       Albuminuria:          31.0 - 300.0                       Clinical albuminuria:       >300.0     05/23/2017 5.6 0.0 - 30.0 mg/g creat Final   2014 3.6 0.0 - 30.0 mg/g creat Final     Comment:     Performed At: [facility], [place]  [id number]  Yusuf Hoffmann MD  [id number]     Key Antihyperglycemic Medications               metFORMIN ER (GLUCOPHAGE XR) 500 mg tablet (Taking) TAKE 1 TABLET BY MOUTH ONCE A DAY WITH DINNER        Hypertension:  Visit Vitals  BP (!) 148/90   Pulse 63   Temp 98.2 °F (36.8 °C) (Temporal)   Resp 20   Ht 5' 4\" (1.626 m)   Wt 213 lb (96.6 kg)   SpO2 96%   BMI 36.56 kg/m²      Patient reports taking medications as instructed. yes   Medication side effects noted. no  Headache upon wakening. no   Home BP monitorin sbp  Do you experience chest pain/pressure or SOB with exertion? no  Maintain a low Sodium diet? yes  Key CAD CHF Meds               amLODIPine (NORVASC) 10 mg tablet (Taking) Take 1 Tablet by mouth daily. metoprolol succinate (TOPROL-XL) 25 mg XL tablet (Taking) TAKE 1 TABLET BY MOUTH  DAILY    hydroCHLOROthiazide (HYDRODIURIL) 25 mg tablet (Taking) TAKE 1 TABLET BY MOUTH  DAILY    lisinopriL (PRINIVIL, ZESTRIL) 40 mg tablet (Taking) TAKE 1 TABLET BY MOUTH  DAILY    simvastatin (Zocor) 10 mg tablet (Taking) TAKE 1 TABLET BY MOUTH AT  NIGHT    aspirin delayed-release 81 mg tablet (Taking) Take 1 Tab by mouth daily. BUN   Date Value Ref Range Status   2022 16 7.0 - 18 MG/DL Final     Creatinine   Date Value Ref Range Status   2022 0.76 0.6 - 1.3 MG/DL Final     GFR est AA   Date Value Ref Range Status   2022 >60 >60 ml/min/1.73m2 Final     Potassium   Date Value Ref Range Status   2022 3.6 3.5 - 5.5 mmol/L Final       HLD:  Has been compliant with meds  Yes  Compliant with low-fat diet. most of the time    Denies myalgias or other side effects. yes  The ASCVD Risk score (Catracho GREENBERG, et al., 2019) failed to calculate for the following reasons:     The valid total cholesterol range is 130 to 320 mg/dL    Cholesterol, total   Date Value Ref Range Status   08/11/2022 107 <200 MG/DL Final     Triglyceride   Date Value Ref Range Status   08/11/2022 101 <150 MG/DL Final     Comment:     The drugs N-acetylcysteine (NAC) and  Metamiszole have been found to cause falsely  low results in this chemical assay. Please  be sure to submit blood samples obtained  BEFORE administration of either of these  drugs to assure correct results. HDL Cholesterol   Date Value Ref Range Status   08/11/2022 64 (H) 40 - 60 MG/DL Final     LDL, calculated   Date Value Ref Range Status   08/11/2022 22.8 0 - 100 MG/DL Final   ]  Key Antihyperlipidemia Meds               simvastatin (Zocor) 10 mg tablet (Taking) TAKE 1 TABLET BY MOUTH AT  NIGHT             ROS:     ROS  As stated in HPI, otherwise all others negative. The problem list was updated as a part of today's visit. Patient Active Problem List   Diagnosis Code    Diastolic dysfunction X30.49    History of hysterectomy Z90.710    Essential hypertension I10    Hyperlipidemia E78.5    Baker's cyst of knee M71.20    Severe obesity (BMI 35.0-39. 9) E66.01    Controlled type 2 diabetes mellitus without complication, without long-term current use of insulin ([facility]) E11.9    Gastroesophageal reflux disease K21.9    History of anaphylaxis to COVID-19 vaccine Z87.892       The PSH, FH were reviewed. SH:  Social History     Tobacco Use    Smoking status: Never    Smokeless tobacco: Never   Vaping Use    Vaping Use: Never used   Substance Use Topics    Alcohol use: No    Drug use: No       Medications/Allergies:  Current Outpatient Medications on File Prior to Visit   Medication Sig Dispense Refill    metFORMIN ER (GLUCOPHAGE XR) 500 mg tablet TAKE 1 TABLET BY MOUTH ONCE A DAY WITH DINNER 90 Tablet 3    omeprazole (PRILOSEC) 20 mg capsule Take 1 Capsule by mouth daily.  90 Capsule 3    metoprolol succinate (TOPROL-XL) 25 mg XL tablet TAKE 1 TABLET BY MOUTH  DAILY 90 Tablet 3    hydroCHLOROthiazide (HYDRODIURIL) 25 mg tablet TAKE 1 TABLET BY MOUTH  DAILY 90 Tablet 3    lisinopriL (PRINIVIL, ZESTRIL) 40 mg tablet TAKE 1 TABLET BY MOUTH  DAILY 90 Tablet 3    simvastatin (Zocor) 10 mg tablet TAKE 1 TABLET BY MOUTH AT  NIGHT 90 Tablet 3    aspirin delayed-release 81 mg tablet Take 1 Tab by mouth daily. 1 Tab 0    [DISCONTINUED] gabapentin (NEURONTIN) 100 mg capsule Take 100 mg by mouth two (2) times a day. [DISCONTINUED] benzonatate (TESSALON) 200 mg capsule Take 1 Capsule by mouth three (3) times daily as needed for Cough. (Patient not taking: Reported on 1/31/2023) 90 Capsule 0    [DISCONTINUED] valACYclovir (VALTREX) 1 gram tablet TAKE ONE TABLET BY MOUTH TWICE A DAY FOR 7 DAYS (Patient not taking: Reported on 1/31/2023) 14 Tablet 0    [DISCONTINUED] Norvasc 5 mg tablet TAKE 1 TABLET BY MOUTH  DAILY 90 Tablet 3     No current facility-administered medications on file prior to visit. Allergies   Allergen Reactions    Amoxicillin Anaphylaxis       Objective:  Visit Vitals  BP (!) 148/90   Pulse 63   Temp 98.2 °F (36.8 °C) (Temporal)   Resp 20   Ht 5' 4\" (1.626 m)   Wt 213 lb (96.6 kg)   SpO2 96%   BMI 36.56 kg/m²    Body mass index is 36.56 kg/m². Physical assessment  Physical Exam  Vitals and nursing note reviewed. Eyes:      Conjunctiva/sclera: Conjunctivae normal.      Pupils: Pupils are equal, round, and reactive to light. Cardiovascular:      Rate and Rhythm: Normal rate and regular rhythm. Heart sounds: Normal heart sounds. No murmur heard. No friction rub. No gallop. Pulmonary:      Effort: Pulmonary effort is normal.      Breath sounds: Normal breath sounds. Musculoskeletal:         General: Normal range of motion. Cervical back: Normal range of motion. Skin:     General: Skin is warm and dry. Neurological:      Mental Status: She is alert.          Labwork and Ancillary Studies:    CBC w/Diff  Lab Results   Component Value Date/Time    WBC 9.9 08/24/2021 08:20 AM    HGB 12.2 08/24/2021 08:20 AM    PLATELET 772 49/71/3078 08:20 AM         Basic Metabolic Profile  Lab Results   Component Value Date/Time    Sodium 140 08/11/2022 02:57 PM    Potassium 3.6 08/11/2022 02:57 PM    Chloride 105 08/11/2022 02:57 PM    CO2 30 08/11/2022 02:57 PM    Anion gap 5 08/11/2022 02:57 PM    Glucose 94 08/11/2022 02:57 PM    BUN 16 08/11/2022 02:57 PM    Creatinine 0.76 08/11/2022 02:57 PM    BUN/Creatinine ratio 21 (H) 08/11/2022 02:57 PM    GFR est AA >60 08/11/2022 02:57 PM    GFR est non-AA >60 08/11/2022 02:57 PM    Calcium 9.6 08/11/2022 02:57 PM        Cholesterol  Lab Results   Component Value Date/Time    Cholesterol, total 107 08/11/2022 02:57 PM    HDL Cholesterol 64 (H) 08/11/2022 02:57 PM    LDL, calculated 22.8 08/11/2022 02:57 PM    Triglyceride 101 08/11/2022 02:57 PM    CHOL/HDL Ratio 1.7 08/11/2022 02:57 PM           I have discussed the diagnosis with the patient and the intended plan as seen in the above orders. The patient has received an After-Visit Summary and questions were answered concerning future plans. An After Visit Summary was printed and given to the patient. All diagnosis have been discussed with the patient and all of the patient's questions have been answered. Follow-up and Dispositions    Return in about 4 weeks (around 2/28/2023) for HTN, 15 min, office only. Joshua Hawk, [facility]  [address] [facility]   [address] [facility] [address]  [address]

## 2023-01-31 NOTE — PROGRESS NOTES
Room 8    When asked if patient has any concerns he would like to address with FATOU Wilkes patient states yes, I think o am having a breakout in my Vagina area. Did patient bring someone? No    Did the patient have DME equipment? NO     Did you take your medication today? Yes       1. \"Have you been to the ER, urgent care clinic since your last visit? Hospitalized since your last visit? \" No    2. \"Have you seen or consulted any other health care providers outside of the [address] [place] [address] since your last visit? \"  Patient states I saw went to the back specialist .       3. For patients aged 39-70: Has the patient had a colonoscopy / FIT/ Cologuard? Yes - no Care Gap present      If the patient is female:    4. For patients aged 41-77: Has the patient had a mammogram within the past 2 years? Yes - no Care Gap present      5. For patients aged 21-65: Has the patient had a pap smear?  Yes - no Care Gap present        3 most recent PHQ Screens 1/31/2023   Little interest or pleasure in doing things Not at all   Feeling down, depressed, irritable, or hopeless Not at all   Total Score PHQ 2 0         Health Maintenance Due   Topic Date Due    Eye Exam Retinal or Dilated  10/07/2016    COVID-19 Vaccine (4 - Booster for Moderna series) 01/05/2022    Flu Vaccine (1) 08/01/2022    Diabetic Alb to Cr ratio (uACR) test  08/24/2022    Bone Densitometry (Dexa) Screening  Never done       Learning Assessment 6/4/2015   PRIMARY LEARNER Patient   HIGHEST LEVEL OF EDUCATION - PRIMARY LEARNER  SOME COLLEGE   BARRIERS PRIMARY LEARNER -   PRIMARY LANGUAGE ENGLISH   LEARNER PREFERENCE PRIMARY DEMONSTRATION   ANSWERED BY self   RELATIONSHIP SELF

## 2023-02-01 LAB
ALBUMIN SERPL-MCNC: 4.5 G/DL (ref 3.8–4.8)
ALBUMIN/GLOB SERPL: 1.8 {RATIO} (ref 1.2–2.2)
ALP SERPL-CCNC: 57 IU/L (ref 44–121)
ALT SERPL-CCNC: 14 IU/L (ref 0–32)
AST SERPL-CCNC: 18 IU/L (ref 0–40)
BILIRUB SERPL-MCNC: 0.2 MG/DL (ref 0–1.2)
BUN SERPL-MCNC: 17 MG/DL (ref 8–27)
BUN/CREAT SERPL: 19 (ref 12–28)
CALCIUM SERPL-MCNC: 9.5 MG/DL (ref 8.7–10.3)
CHLORIDE SERPL-SCNC: 100 MMOL/L (ref 96–106)
CHOLEST SERPL-MCNC: 111 MG/DL (ref 100–199)
CO2 SERPL-SCNC: 25 MMOL/L (ref 20–29)
CREAT SERPL-MCNC: 0.9 MG/DL (ref 0.57–1)
EGFRCR SERPLBLD CKD-EPI 2021: 71 ML/MIN/1.73
EST. AVERAGE GLUCOSE BLD GHB EST-MCNC: 128 MG/DL
GLOBULIN SER CALC-MCNC: 2.5 G/DL (ref 1.5–4.5)
GLUCOSE SERPL-MCNC: 96 MG/DL (ref 70–99)
HBA1C MFR BLD: 6.1 % (ref 4.8–5.6)
HDLC SERPL-MCNC: 52 MG/DL
IMP & REVIEW OF LAB RESULTS: NORMAL
LDLC SERPL CALC-MCNC: 35 MG/DL (ref 0–99)
POTASSIUM SERPL-SCNC: 3.9 MMOL/L (ref 3.5–5.2)
PROT SERPL-MCNC: 7 G/DL (ref 6–8.5)
SODIUM SERPL-SCNC: 140 MMOL/L (ref 134–144)
TRIGL SERPL-MCNC: 143 MG/DL (ref 0–149)
VLDLC SERPL CALC-MCNC: 24 MG/DL (ref 5–40)

## 2023-02-02 LAB
HSV1 IGG SER IA-ACNC: 30.9 INDEX (ref 0–0.9)
HSV2 IGG SER IA-ACNC: 9.97 INDEX (ref 0–0.9)
SPECIMEN STATUS REPORT, ROLRST: NORMAL

## 2023-02-07 ENCOUNTER — TELEPHONE (OUTPATIENT)
Dept: FAMILY MEDICINE CLINIC | Age: 68
End: 2023-02-07

## 2023-02-07 DIAGNOSIS — B00.9 HSV INFECTION: Primary | ICD-10-CM

## 2023-02-07 RX ORDER — VALACYCLOVIR HYDROCHLORIDE 500 MG/1
TABLET, FILM COATED ORAL
Qty: 90 TABLET | Refills: 1 | Status: SHIPPED | OUTPATIENT
Start: 2023-02-07

## 2023-02-07 NOTE — TELEPHONE ENCOUNTER
Called to review labs. 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   Patient verbalized understanding and is in agreement with this plan of care

## 2023-03-01 DIAGNOSIS — I10 ESSENTIAL HYPERTENSION: Primary | ICD-10-CM

## 2023-03-01 RX ORDER — AMLODIPINE BESYLATE 5 MG/1
10 TABLET ORAL DAILY
Qty: 90 TABLET | Refills: 1 | Status: SHIPPED | OUTPATIENT
Start: 2023-03-01

## 2023-03-02 ENCOUNTER — HOSPITAL ENCOUNTER (OUTPATIENT)
Facility: HOSPITAL | Age: 68
Discharge: HOME | End: 2023-03-02
Payer: COMMERCIAL

## 2023-03-02 DIAGNOSIS — M54.16 LEFT LUMBAR RADICULOPATHY: ICD-10-CM

## 2023-03-02 PROCEDURE — 72148 MRI LUMBAR SPINE W/O DYE: CPT

## 2023-03-06 ENCOUNTER — CLINICAL DOCUMENTATION (OUTPATIENT)
Facility: CLINIC | Age: 68
End: 2023-03-06

## 2023-03-06 NOTE — PROGRESS NOTES
Patient presents today for Blood Pressure check. Patient seated and resting 15 min with both feet flat on the floor. Blood pressure taking and documented . Reported blood pressure to MERVAT Ruiz. Patient 1st blood pressure is 140/88 and the 2nd bp is 130/78. Patient blood pressure was taking on 02/02/23.

## 2023-03-07 ENCOUNTER — TELEPHONE (OUTPATIENT)
Age: 68
End: 2023-03-07

## 2023-03-07 NOTE — TELEPHONE ENCOUNTER
----- Message from Huyen Sherman MD sent at 3/7/2023  1:05 PM EST -----  Regarding: MRI  Can you have patient schedule a follow up to review MRI? It can be an in person or video visit    Thanks!

## 2023-03-07 NOTE — TELEPHONE ENCOUNTER
Lvm for the patient asking her to call the office back to get her follow up don appt schedule with [facility]

## 2023-03-09 ENCOUNTER — TELEMEDICINE (OUTPATIENT)
Age: 68
End: 2023-03-09
Payer: COMMERCIAL

## 2023-03-09 DIAGNOSIS — M54.16 LEFT LUMBAR RADICULOPATHY: Primary | ICD-10-CM

## 2023-03-09 PROCEDURE — 99442 PR PHYS/QHP TELEPHONE EVALUATION 11-20 MIN: CPT | Performed by: PHYSICAL MEDICINE & REHABILITATION

## 2023-03-09 RX ORDER — PREGABALIN 50 MG/1
50 CAPSULE ORAL 2 TIMES DAILY
Qty: 60 CAPSULE | Refills: 0 | Status: SHIPPED | OUTPATIENT
Start: 2023-03-09 | End: 2023-04-08
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
## 2023-03-10 ENCOUNTER — TELEPHONE (OUTPATIENT)
Facility: CLINIC | Age: 68
End: 2023-03-10

## 2023-03-13 ENCOUNTER — TELEPHONE (OUTPATIENT)
Facility: CLINIC | Age: 68
End: 2023-03-13

## 2023-03-15 DIAGNOSIS — I10 ESSENTIAL HYPERTENSION: ICD-10-CM

## 2023-03-15 RX ORDER — AMLODIPINE BESYLATE 10 MG/1
10 TABLET ORAL DAILY
Qty: 90 TABLET | Refills: 1 | Status: SHIPPED | OUTPATIENT
Start: 2023-03-15

## 2023-03-15 RX ORDER — AMLODIPINE BESYLATE 10 MG/1
10 TABLET ORAL DAILY
Qty: 90 TABLET | Refills: 1 | Status: SHIPPED | OUTPATIENT
Start: 2023-03-15 | End: 2023-03-15 | Stop reason: SDUPTHER

## 2023-03-15 RX ORDER — AMLODIPINE BESYLATE 5 MG/1
10 TABLET ORAL DAILY
Qty: 90 TABLET | Refills: 1 | Status: CANCELLED | OUTPATIENT
Start: 2023-03-15

## 2023-03-15 RX ORDER — AMLODIPINE BESYLATE 5 MG/1
5 TABLET ORAL DAILY
Qty: 90 TABLET | Refills: 1 | Status: CANCELLED | OUTPATIENT
Start: 2023-03-15

## 2023-03-15 NOTE — TELEPHONE ENCOUNTER
This patient's medication was sent to the wrong pharmacy. She is requesting a 90 day supply go to her mail order ([facility]) and a 30 supply go to [facility]. Patient stated he meds were increased to 10mg and she was to take 2 of the medication she has left. She now only has 1 1/2 day supply of her blood pressure medication. The mail order need to be changed to 10 mg and sig to take 1 daily. A 30 day supply need to be added to [facility].

## 2023-03-17 DIAGNOSIS — I10 ESSENTIAL HYPERTENSION: ICD-10-CM

## 2023-03-17 RX ORDER — AMLODIPINE BESYLATE 10 MG/1
10 TABLET ORAL DAILY
Qty: 90 TABLET | Refills: 1 | OUTPATIENT
Start: 2023-03-17

## 2023-03-17 RX ORDER — AMLODIPINE BESYLATE 10 MG/1
10 TABLET ORAL DAILY
Qty: 90 TABLET | Refills: 1 | Status: CANCELLED | OUTPATIENT
Start: 2023-03-17

## 2023-04-05 DIAGNOSIS — M54.16 LEFT LUMBAR RADICULOPATHY: ICD-10-CM

## 2023-04-06 RX ORDER — PREGABALIN 50 MG/1
CAPSULE ORAL
Qty: 60 CAPSULE | Refills: 0 | Status: SHIPPED | OUTPATIENT
Start: 2023-04-06 | End: 2023-05-06

## 2023-04-06 NOTE — TELEPHONE ENCOUNTER
Renewed for one month. Can you have her schedule in person or virtual to see how the medication is working?

## 2023-05-04 DIAGNOSIS — M54.16 LEFT LUMBAR RADICULOPATHY: ICD-10-CM

## 2023-05-17 RX ORDER — PREGABALIN 50 MG/1
CAPSULE ORAL
Qty: 60 CAPSULE | Refills: 0 | Status: SHIPPED | OUTPATIENT
Start: 2023-05-17 | End: 2023-06-16

## 2023-06-19 ENCOUNTER — OFFICE VISIT (OUTPATIENT)
Facility: CLINIC | Age: 68
End: 2023-06-19
Payer: COMMERCIAL

## 2023-06-19 VITALS
TEMPERATURE: 98.2 F | BODY MASS INDEX: 36.88 KG/M2 | SYSTOLIC BLOOD PRESSURE: 131 MMHG | HEART RATE: 73 BPM | HEIGHT: 64 IN | DIASTOLIC BLOOD PRESSURE: 82 MMHG | OXYGEN SATURATION: 95 % | WEIGHT: 216 LBS | RESPIRATION RATE: 18 BRPM

## 2023-06-19 DIAGNOSIS — Z11.4 SCREENING FOR HIV (HUMAN IMMUNODEFICIENCY VIRUS): ICD-10-CM

## 2023-06-19 DIAGNOSIS — E78.2 MIXED HYPERLIPIDEMIA: ICD-10-CM

## 2023-06-19 DIAGNOSIS — J32.1 SINUSITIS CHRONIC, FRONTAL: ICD-10-CM

## 2023-06-19 DIAGNOSIS — I10 ESSENTIAL HYPERTENSION: Primary | ICD-10-CM

## 2023-06-19 DIAGNOSIS — E11.9 CONTROLLED TYPE 2 DIABETES MELLITUS WITHOUT COMPLICATION, WITHOUT LONG-TERM CURRENT USE OF INSULIN (HCC): ICD-10-CM

## 2023-06-19 PROCEDURE — G8427 DOCREV CUR MEDS BY ELIG CLIN: HCPCS | Performed by: NURSE PRACTITIONER

## 2023-06-19 PROCEDURE — 1090F PRES/ABSN URINE INCON ASSESS: CPT | Performed by: NURSE PRACTITIONER

## 2023-06-19 PROCEDURE — G8417 CALC BMI ABV UP PARAM F/U: HCPCS | Performed by: NURSE PRACTITIONER

## 2023-06-19 PROCEDURE — 3017F COLORECTAL CA SCREEN DOC REV: CPT | Performed by: NURSE PRACTITIONER

## 2023-06-19 PROCEDURE — 99214 OFFICE O/P EST MOD 30 MIN: CPT | Performed by: NURSE PRACTITIONER

## 2023-06-19 PROCEDURE — 2022F DILAT RTA XM EVC RTNOPTHY: CPT | Performed by: NURSE PRACTITIONER

## 2023-06-19 PROCEDURE — 3078F DIAST BP <80 MM HG: CPT | Performed by: NURSE PRACTITIONER

## 2023-06-19 PROCEDURE — G8400 PT W/DXA NO RESULTS DOC: HCPCS | Performed by: NURSE PRACTITIONER

## 2023-06-19 PROCEDURE — 1036F TOBACCO NON-USER: CPT | Performed by: NURSE PRACTITIONER

## 2023-06-19 PROCEDURE — 3074F SYST BP LT 130 MM HG: CPT | Performed by: NURSE PRACTITIONER

## 2023-06-19 PROCEDURE — 3044F HG A1C LEVEL LT 7.0%: CPT | Performed by: NURSE PRACTITIONER

## 2023-06-19 PROCEDURE — 1123F ACP DISCUSS/DSCN MKR DOCD: CPT | Performed by: NURSE PRACTITIONER

## 2023-06-19 RX ORDER — AZELASTINE 1 MG/ML
1 SPRAY, METERED NASAL 2 TIMES DAILY
Qty: 30 ML | Refills: 0 | Status: SHIPPED | OUTPATIENT
Start: 2023-06-19

## 2023-06-19 ASSESSMENT — PATIENT HEALTH QUESTIONNAIRE - PHQ9
SUM OF ALL RESPONSES TO PHQ QUESTIONS 1-9: 0
2. FEELING DOWN, DEPRESSED OR HOPELESS: 0
SUM OF ALL RESPONSES TO PHQ QUESTIONS 1-9: 0
SUM OF ALL RESPONSES TO PHQ9 QUESTIONS 1 & 2: 0
1. LITTLE INTEREST OR PLEASURE IN DOING THINGS: 0

## 2023-07-24 ENCOUNTER — TELEPHONE (OUTPATIENT)
Facility: CLINIC | Age: 68
End: 2023-07-24

## 2023-07-24 NOTE — TELEPHONE ENCOUNTER
Returned call. Advised to either do a my chart evisit or come into the office to have her urine checked.  States she will come in on Friday for urine test.

## 2023-07-24 NOTE — TELEPHONE ENCOUNTER
Phone call from patient stating she has an UTI and needs a prescription. She states you have treated her for this before. She requested to speak with you.     Call back  [phone]

## 2023-07-28 ENCOUNTER — OFFICE VISIT (OUTPATIENT)
Facility: CLINIC | Age: 68
End: 2023-07-28
Payer: COMMERCIAL

## 2023-07-28 DIAGNOSIS — R31.9 HEMATURIA, UNSPECIFIED TYPE: ICD-10-CM

## 2023-07-28 DIAGNOSIS — N39.0 URINARY TRACT INFECTION WITHOUT HEMATURIA, SITE UNSPECIFIED: Primary | ICD-10-CM

## 2023-07-28 DIAGNOSIS — R10.2 PELVIC PRESSURE IN FEMALE: Primary | ICD-10-CM

## 2023-07-28 LAB
BILIRUBIN, URINE, POC: NEGATIVE
BLOOD URINE, POC: NEGATIVE
GLUCOSE URINE, POC: NEGATIVE
KETONES, URINE, POC: NEGATIVE
LEUKOCYTE ESTERASE, URINE, POC: NORMAL
NITRITE, URINE, POC: NEGATIVE
PH, URINE, POC: 6.5 (ref 4.6–8)
PROTEIN,URINE, POC: NEGATIVE
SPECIFIC GRAVITY, URINE, POC: 1.02 (ref 1–1.03)
URINALYSIS CLARITY, POC: CLEAR
URINALYSIS COLOR, POC: YELLOW
UROBILINOGEN, POC: NORMAL

## 2023-07-28 PROCEDURE — 81003 URINALYSIS AUTO W/O SCOPE: CPT | Performed by: NURSE PRACTITIONER

## 2023-07-28 RX ORDER — SIMVASTATIN 10 MG
10 TABLET ORAL NIGHTLY
Qty: 90 TABLET | Refills: 1 | Status: SHIPPED | OUTPATIENT
Start: 2023-07-28

## 2023-07-28 SDOH — ECONOMIC STABILITY: FOOD INSECURITY: WITHIN THE PAST 12 MONTHS, YOU WORRIED THAT YOUR FOOD WOULD RUN OUT BEFORE YOU GOT MONEY TO BUY MORE.: NEVER TRUE

## 2023-07-28 SDOH — ECONOMIC STABILITY: FOOD INSECURITY: WITHIN THE PAST 12 MONTHS, THE FOOD YOU BOUGHT JUST DIDN'T LAST AND YOU DIDN'T HAVE MONEY TO GET MORE.: NEVER TRUE

## 2023-07-28 SDOH — ECONOMIC STABILITY: INCOME INSECURITY: HOW HARD IS IT FOR YOU TO PAY FOR THE VERY BASICS LIKE FOOD, HOUSING, MEDICAL CARE, AND HEATING?: NOT HARD AT ALL

## 2023-07-28 SDOH — ECONOMIC STABILITY: HOUSING INSECURITY
IN THE LAST 12 MONTHS, WAS THERE A TIME WHEN YOU DID NOT HAVE A STEADY PLACE TO SLEEP OR SLEPT IN A SHELTER (INCLUDING NOW)?: NO

## 2023-07-28 ASSESSMENT — PATIENT HEALTH QUESTIONNAIRE - PHQ9
SUM OF ALL RESPONSES TO PHQ QUESTIONS 1-9: 0
2. FEELING DOWN, DEPRESSED OR HOPELESS: 0
1. LITTLE INTEREST OR PLEASURE IN DOING THINGS: 0
SUM OF ALL RESPONSES TO PHQ9 QUESTIONS 1 & 2: 0
SUM OF ALL RESPONSES TO PHQ QUESTIONS 1-9: 0

## 2023-07-30 LAB — BACTERIA UR CULT: NORMAL

## 2023-08-26 LAB — MAMMOGRAPHY, EXTERNAL: NEGATIVE

## 2023-09-11 DIAGNOSIS — I10 ESSENTIAL HYPERTENSION: ICD-10-CM

## 2023-09-11 RX ORDER — AMLODIPINE BESYLATE 10 MG/1
10 TABLET ORAL DAILY
Qty: 90 TABLET | Refills: 1 | Status: SHIPPED | OUTPATIENT
Start: 2023-09-11

## 2023-09-11 RX ORDER — OMEPRAZOLE 20 MG/1
20 CAPSULE, DELAYED RELEASE ORAL DAILY
Qty: 90 CAPSULE | Refills: 1 | Status: SHIPPED | OUTPATIENT
Start: 2023-09-11

## 2023-09-11 RX ORDER — METOPROLOL SUCCINATE 25 MG/1
25 TABLET, EXTENDED RELEASE ORAL DAILY
Qty: 90 TABLET | Refills: 1 | Status: SHIPPED | OUTPATIENT
Start: 2023-09-11

## 2023-09-11 RX ORDER — HYDROCHLOROTHIAZIDE 25 MG/1
25 TABLET ORAL DAILY
Qty: 90 TABLET | Refills: 1 | Status: SHIPPED | OUTPATIENT
Start: 2023-09-11

## 2023-09-11 RX ORDER — LISINOPRIL 40 MG/1
40 TABLET ORAL DAILY
Qty: 90 TABLET | Refills: 1 | Status: SHIPPED | OUTPATIENT
Start: 2023-09-11

## 2023-09-11 RX ORDER — EMPAGLIFLOZIN 10 MG/1
10 TABLET, FILM COATED ORAL DAILY
Qty: 90 TABLET | Refills: 1 | Status: SHIPPED | OUTPATIENT
Start: 2023-09-11

## 2023-09-12 ENCOUNTER — HOSPITAL ENCOUNTER (OUTPATIENT)
Facility: HOSPITAL | Age: 68
Setting detail: SPECIMEN
Discharge: HOME | End: 2023-09-15
Payer: COMMERCIAL

## 2023-09-12 LAB
ALBUMIN SERPL-MCNC: 3.9 G/DL (ref 3.4–5)
ALBUMIN/GLOB SERPL: 1.3 (ref 0.8–1.7)
ALP SERPL-CCNC: 65 U/L (ref 45–117)
ALT SERPL-CCNC: 20 U/L (ref 13–56)
ANION GAP SERPL CALC-SCNC: 5 MMOL/L (ref 3–18)
AST SERPL-CCNC: 8 U/L (ref 10–38)
BILIRUB SERPL-MCNC: 0.6 MG/DL (ref 0.2–1)
BUN SERPL-MCNC: 16 MG/DL (ref 7–18)
BUN/CREAT SERPL: 19 (ref 12–20)
CALCIUM SERPL-MCNC: 9.4 MG/DL (ref 8.5–10.1)
CHLORIDE SERPL-SCNC: 107 MMOL/L (ref 100–111)
CHOLEST SERPL-MCNC: 95 MG/DL
CO2 SERPL-SCNC: 27 MMOL/L (ref 21–32)
CREAT SERPL-MCNC: 0.83 MG/DL (ref 0.6–1.3)
EST. AVERAGE GLUCOSE BLD GHB EST-MCNC: 128 MG/DL
GLOBULIN SER CALC-MCNC: 3.1 G/DL (ref 2–4)
GLUCOSE SERPL-MCNC: 143 MG/DL (ref 74–99)
HBA1C MFR BLD: 6.1 % (ref 4.2–5.6)
HDLC SERPL-MCNC: 52 MG/DL (ref 40–60)
HDLC SERPL: 1.8 (ref 0–5)
LDLC SERPL CALC-MCNC: 21.6 MG/DL (ref 0–100)
LIPID PANEL: NORMAL
POTASSIUM SERPL-SCNC: 3.8 MMOL/L (ref 3.5–5.5)
PROT SERPL-MCNC: 7 G/DL (ref 6.4–8.2)
SODIUM SERPL-SCNC: 139 MMOL/L (ref 136–145)
TRIGL SERPL-MCNC: 107 MG/DL
VLDLC SERPL CALC-MCNC: 21.4 MG/DL

## 2023-09-12 PROCEDURE — 80061 LIPID PANEL: CPT

## 2023-09-12 PROCEDURE — 36415 COLL VENOUS BLD VENIPUNCTURE: CPT

## 2023-09-12 PROCEDURE — 87389 HIV-1 AG W/HIV-1&-2 AB AG IA: CPT

## 2023-09-12 PROCEDURE — 83036 HEMOGLOBIN GLYCOSYLATED A1C: CPT

## 2023-09-12 PROCEDURE — 80053 COMPREHEN METABOLIC PANEL: CPT

## 2023-09-15 LAB
HIV 1+2 AB+HIV1 P24 AG SERPL QL IA: NONREACTIVE
HIV 1/2 RESULT COMMENT: NORMAL

## 2023-09-19 ENCOUNTER — OFFICE VISIT (OUTPATIENT)
Facility: CLINIC | Age: 68
End: 2023-09-19
Payer: COMMERCIAL

## 2023-09-19 VITALS
OXYGEN SATURATION: 94 % | SYSTOLIC BLOOD PRESSURE: 148 MMHG | HEART RATE: 75 BPM | RESPIRATION RATE: 18 BRPM | DIASTOLIC BLOOD PRESSURE: 88 MMHG | WEIGHT: 221 LBS | BODY MASS INDEX: 37.73 KG/M2 | TEMPERATURE: 98.2 F | HEIGHT: 64 IN

## 2023-09-19 DIAGNOSIS — Z23 ENCOUNTER FOR IMMUNIZATION: ICD-10-CM

## 2023-09-19 DIAGNOSIS — E11.9 CONTROLLED TYPE 2 DIABETES MELLITUS WITHOUT COMPLICATION, WITHOUT LONG-TERM CURRENT USE OF INSULIN (HCC): Primary | ICD-10-CM

## 2023-09-19 DIAGNOSIS — B00.9 HERPESVIRAL INFECTION, UNSPECIFIED: ICD-10-CM

## 2023-09-19 DIAGNOSIS — E78.2 MIXED HYPERLIPIDEMIA: ICD-10-CM

## 2023-09-19 DIAGNOSIS — I10 ESSENTIAL HYPERTENSION: ICD-10-CM

## 2023-09-19 PROCEDURE — 3078F DIAST BP <80 MM HG: CPT | Performed by: NURSE PRACTITIONER

## 2023-09-19 PROCEDURE — 99214 OFFICE O/P EST MOD 30 MIN: CPT | Performed by: NURSE PRACTITIONER

## 2023-09-19 PROCEDURE — 90471 IMMUNIZATION ADMIN: CPT | Performed by: NURSE PRACTITIONER

## 2023-09-19 PROCEDURE — 1090F PRES/ABSN URINE INCON ASSESS: CPT | Performed by: NURSE PRACTITIONER

## 2023-09-19 PROCEDURE — 3074F SYST BP LT 130 MM HG: CPT | Performed by: NURSE PRACTITIONER

## 2023-09-19 PROCEDURE — 2022F DILAT RTA XM EVC RTNOPTHY: CPT | Performed by: NURSE PRACTITIONER

## 2023-09-19 PROCEDURE — 1036F TOBACCO NON-USER: CPT | Performed by: NURSE PRACTITIONER

## 2023-09-19 PROCEDURE — 90694 VACC AIIV4 NO PRSRV 0.5ML IM: CPT | Performed by: NURSE PRACTITIONER

## 2023-09-19 PROCEDURE — 3017F COLORECTAL CA SCREEN DOC REV: CPT | Performed by: NURSE PRACTITIONER

## 2023-09-19 PROCEDURE — G8400 PT W/DXA NO RESULTS DOC: HCPCS | Performed by: NURSE PRACTITIONER

## 2023-09-19 PROCEDURE — G8427 DOCREV CUR MEDS BY ELIG CLIN: HCPCS | Performed by: NURSE PRACTITIONER

## 2023-09-19 PROCEDURE — 1123F ACP DISCUSS/DSCN MKR DOCD: CPT | Performed by: NURSE PRACTITIONER

## 2023-09-19 PROCEDURE — 3044F HG A1C LEVEL LT 7.0%: CPT | Performed by: NURSE PRACTITIONER

## 2023-09-19 PROCEDURE — G8417 CALC BMI ABV UP PARAM F/U: HCPCS | Performed by: NURSE PRACTITIONER

## 2023-09-19 RX ORDER — METOPROLOL SUCCINATE 50 MG/1
50 TABLET, EXTENDED RELEASE ORAL DAILY
Qty: 90 TABLET | Refills: 1 | Status: SHIPPED | OUTPATIENT
Start: 2023-09-19

## 2023-09-19 RX ORDER — VALACYCLOVIR HYDROCHLORIDE 1 G/1
TABLET, FILM COATED ORAL
Qty: 90 TABLET | Refills: 1 | Status: SHIPPED | OUTPATIENT
Start: 2023-09-19

## 2023-09-19 ASSESSMENT — PATIENT HEALTH QUESTIONNAIRE - PHQ9
SUM OF ALL RESPONSES TO PHQ QUESTIONS 1-9: 2
SUM OF ALL RESPONSES TO PHQ9 QUESTIONS 1 & 2: 2
1. LITTLE INTEREST OR PLEASURE IN DOING THINGS: 1
2. FEELING DOWN, DEPRESSED OR HOPELESS: 1

## 2023-09-19 NOTE — PROGRESS NOTES
Patient presents to office for Flu Vaccine injection. Injection ordered per smart set and pended  for Jennifer Shukla to sign. After obtaining signed consent from patient area was prepped and injection given IM. No signs nor symptoms of adverse reaction noted. Patient tolerated injection well.
[address]  had concerns including Follow-up Chronic Condition, Diabetes, Cholesterol Problem, and Hypertension. for today's visit . When asked if patient has any concerns she would like to address with YAJAIRA Dhillon . Patient states I would like to discuss medication Jardience  and I am having shortness of Breathe . Patient states I do not remember when the shortness of breathe started . YAJAIRA Dhillon has been notified  of patient concerns . Did patient bring someone? No     Did the patient have DME equipment? No     Did you take your medication today? Yes       1. \"Have you been to the ER, urgent care clinic since your last visit? Hospitalized since your last visit? \" . NO    2. \"Have you seen or consulted any other health care providers outside of the [address] [place] [address] since your last visit? \" No     3. For patients aged 43-73: Has the patient had a colonoscopy / FIT/ Cologuard? Yes      If the patient is female:    4. For patients aged 43-66: Has the patient had a mammogram within the past 2 years? Yes       5. For patients aged 21-65: Has the patient had a pap smear? {Cancer Care Gap present? Yes           9/19/2023     4:15 PM   Amb Fall Risk Assessment and TUG Test   Do you feel unsteady or are you worried about falling?  no   2 or more falls in past year? no   Fall with injury in past year?  no              9/19/2023     4:15 PM   PHQ-9    Little interest or pleasure in doing things 1   Feeling down, depressed, or hopeless 1   PHQ-2 Score 2   PHQ-9 Total Score 2          Health Maintenance Due   Topic Date Due    DEXA (modify frequency per FRAX score)  Never done    Hepatitis B vaccine (1 of 3 - Risk 3-dose series) Never done    COVID-19 Vaccine (4 - Moderna series) 01/05/2022    Diabetic Alb to Cr ratio (uACR) test  08/24/2022    Diabetic retinal exam  06/01/2023    Flu vaccine (1) 08/01/2023    Breast cancer screen  08/16/2023    Diabetic foot exam  08/18/2023
nightly - Oral           Reflux  Taking omeprazole  Still has reflux symptoms of chest pain located mid sternum that lasts 2-3minutes at a time  Happens about every other day  Denies getting a sour taste in her mouth  Clears her throat frequently, happens intermittently, not every day  Denies nausea or vomiting  The problem can happen any time of day  Eating makes no difference                ROS:     Review of Systems  As stated in HPI, otherwise all others negative. The problem list was updated as a part of today's visit. Patient Active Problem List   Diagnosis    Controlled type 2 diabetes mellitus without complication, without long-term current use of insulin ([address])    History of hysterectomy    Diastolic dysfunction    Baker's cyst of knee    Essential hypertension    Mixed hyperlipidemia    Gastroesophageal reflux disease    History of anaphylaxis to COVID-19 vaccine       The PSH, FH were reviewed. SH:  Social History     Tobacco Use    Smoking status: Never    Smokeless tobacco: Never   Substance Use Topics    Alcohol use: No    Drug use: No       Medications/Allergies:  Current Outpatient Medications on File Prior to Visit   Medication Sig Dispense Refill    amLODIPine (NORVASC) 10 MG tablet TAKE 1 TABLET BY MOUTH DAILY 90 tablet 1    hydroCHLOROthiazide (HYDRODIURIL) 25 MG tablet TAKE 1 TABLET BY MOUTH  DAILY 90 tablet 1    lisinopril (PRINIVIL;ZESTRIL) 40 MG tablet TAKE 1 TABLET BY MOUTH  DAILY 90 tablet 1    omeprazole (PRILOSEC) 20 MG delayed release capsule TAKE 1 CAPSULE BY MOUTH DAILY.  90 capsule 1    metoprolol succinate (TOPROL XL) 25 MG extended release tablet TAKE 1 TABLET BY MOUTH ONCE A DAY 90 tablet 1    simvastatin (ZOCOR) 10 MG tablet Take 1 tablet by mouth nightly 90 tablet 1    azelastine (ASTELIN) 0.1 % nasal spray 1 spray by Nasal route 2 times daily Use in each nostril as directed 30 mL 0    aspirin 81 MG EC tablet Take 1 tablet by mouth daily      benzonatate (TESSALON) 200

## 2023-10-11 ENCOUNTER — TELEPHONE (OUTPATIENT)
Facility: CLINIC | Age: 68
End: 2023-10-11

## 2023-10-11 NOTE — TELEPHONE ENCOUNTER
----- Message from [facility] sent at 10/10/2023 11:38 AM EDT -----  Subject: Message to Provider    QUESTIONS  Information for Provider? Abdirahman with [facility] called   in reference to a medication metoprolol succinate (TOPROL XL) and   currently requesting clarification for the strength of the medication that   the pt is currently on. Please follow up with Abdirahman at [phone] as were   unable to reach the clinical staff at this time. ---------------------------------------------------------------------------  --------------  David PALMER  [phone]; OK to leave message on voicemail  ---------------------------------------------------------------------------  --------------  SCRIPT ANSWERS  Relationship to Patient? Covered Entity  Covered Entity Type? Pharmacy? Representative Name?  Gable Alpers

## 2023-10-11 NOTE — TELEPHONE ENCOUNTER
Returned call to [employer]. Spoke with Celena. States her computer system is down right now and will call back.

## 2023-10-13 NOTE — TELEPHONE ENCOUNTER
Returned call to [employer] with Jerry he spoke with the patient. And the questions were answered.

## 2023-12-01 ENCOUNTER — COMMUNITY OUTREACH (OUTPATIENT)
Facility: CLINIC | Age: 68
End: 2023-12-01

## 2024-01-22 ENCOUNTER — TELEPHONE (OUTPATIENT)
Facility: CLINIC | Age: 69
End: 2024-01-22

## 2024-01-22 RX ORDER — SIMVASTATIN 10 MG
10 TABLET ORAL NIGHTLY
Qty: 90 TABLET | Refills: 1 | Status: SHIPPED | OUTPATIENT
Start: 2024-01-22

## 2024-01-22 NOTE — TELEPHONE ENCOUNTER
MIRTHA  Called patient to schedule follow up appointment and she advised that she does not have any insurance.  She wanted  you to know that it will not be until February before she has insurance.  She scheduled appointment got 3/11.

## 2024-03-11 ENCOUNTER — OFFICE VISIT (OUTPATIENT)
Facility: CLINIC | Age: 69
End: 2024-03-11
Payer: MEDICAID

## 2024-03-11 VITALS
DIASTOLIC BLOOD PRESSURE: 81 MMHG | RESPIRATION RATE: 16 BRPM | HEIGHT: 64 IN | SYSTOLIC BLOOD PRESSURE: 139 MMHG | TEMPERATURE: 97.6 F | OXYGEN SATURATION: 96 % | WEIGHT: 215 LBS | BODY MASS INDEX: 36.7 KG/M2 | HEART RATE: 74 BPM

## 2024-03-11 DIAGNOSIS — I10 ESSENTIAL HYPERTENSION: ICD-10-CM

## 2024-03-11 DIAGNOSIS — K21.9 GASTROESOPHAGEAL REFLUX DISEASE WITHOUT ESOPHAGITIS: ICD-10-CM

## 2024-03-11 DIAGNOSIS — E78.2 MIXED HYPERLIPIDEMIA: ICD-10-CM

## 2024-03-11 DIAGNOSIS — E11.9 CONTROLLED TYPE 2 DIABETES MELLITUS WITHOUT COMPLICATION, WITHOUT LONG-TERM CURRENT USE OF INSULIN (HCC): Primary | ICD-10-CM

## 2024-03-11 PROBLEM — B00.9 HERPESVIRAL INFECTION, UNSPECIFIED: Status: RESOLVED | Noted: 2023-09-19 | Resolved: 2024-03-11

## 2024-03-11 PROCEDURE — 1123F ACP DISCUSS/DSCN MKR DOCD: CPT | Performed by: NURSE PRACTITIONER

## 2024-03-11 PROCEDURE — 3075F SYST BP GE 130 - 139MM HG: CPT | Performed by: NURSE PRACTITIONER

## 2024-03-11 PROCEDURE — 3079F DIAST BP 80-89 MM HG: CPT | Performed by: NURSE PRACTITIONER

## 2024-03-11 PROCEDURE — 99214 OFFICE O/P EST MOD 30 MIN: CPT | Performed by: NURSE PRACTITIONER

## 2024-03-11 RX ORDER — HYDROCHLOROTHIAZIDE 25 MG/1
25 TABLET ORAL DAILY
Qty: 90 TABLET | Refills: 1 | Status: SHIPPED | OUTPATIENT
Start: 2024-03-11

## 2024-03-11 RX ORDER — METFORMIN HYDROCHLORIDE 500 MG/1
500 TABLET, EXTENDED RELEASE ORAL
Qty: 90 TABLET | Refills: 1 | Status: SHIPPED | OUTPATIENT
Start: 2024-03-11

## 2024-03-11 RX ORDER — LISINOPRIL 40 MG/1
40 TABLET ORAL DAILY
Qty: 90 TABLET | Refills: 1 | Status: SHIPPED | OUTPATIENT
Start: 2024-03-11

## 2024-03-11 RX ORDER — ATORVASTATIN CALCIUM 10 MG/1
10 TABLET, FILM COATED ORAL DAILY
Qty: 90 TABLET | Refills: 1 | Status: SHIPPED | OUTPATIENT
Start: 2024-03-11

## 2024-03-11 RX ORDER — AMLODIPINE BESYLATE 10 MG/1
10 TABLET ORAL DAILY
Qty: 90 TABLET | Refills: 1 | Status: SHIPPED | OUTPATIENT
Start: 2024-03-11

## 2024-03-11 RX ORDER — OMEPRAZOLE 20 MG/1
20 CAPSULE, DELAYED RELEASE ORAL DAILY
Qty: 90 CAPSULE | Refills: 1 | Status: SHIPPED | OUTPATIENT
Start: 2024-03-11

## 2024-03-11 RX ORDER — SIMVASTATIN 10 MG
10 TABLET ORAL NIGHTLY
Qty: 90 TABLET | Refills: 1 | Status: CANCELLED | OUTPATIENT
Start: 2024-03-11

## 2024-03-11 RX ORDER — METOPROLOL SUCCINATE 50 MG/1
50 TABLET, EXTENDED RELEASE ORAL DAILY
Qty: 90 TABLET | Refills: 1 | Status: SHIPPED | OUTPATIENT
Start: 2024-03-11

## 2024-03-11 ASSESSMENT — PATIENT HEALTH QUESTIONNAIRE - PHQ9
2. FEELING DOWN, DEPRESSED OR HOPELESS: NOT AT ALL
SUM OF ALL RESPONSES TO PHQ QUESTIONS 1-9: 0

## 2024-03-11 NOTE — PROGRESS NOTES
Jaycee De Leon is a 68 y.o. female (: 1955) presenting to address:    Chief Complaint   Patient presents with    3 Month Follow-Up       Vitals:    24 0856   BP: 139/81   Pulse: 74   Resp: 16   Temp: 97.6 °F (36.4 °C)   SpO2: 96%       Coordination of Care Questionaire:   1. \"Have you been to the ER, urgent care clinic since your last visit?  Hospitalized since your last visit?\" No     2. \"Have you seen or consulted any other health care providers outside of the [facility] since your last visit?\" No      3. For patients aged 45-75: Has the patient had a colonoscopy / FIT/ Cologuard? Last year      If the patient is female:    4. For patients aged 40-74: Has the patient had a mammogram within the past 2 years? Last Year      5. For patients aged 21-65: Has the patient had a pap smear?Yes    Advanced Directive:   1. Do you have an Advanced Directive? No     2. Would you like information on Advanced Directives? No   
diet.  most of the time    Denies myalgias or other side effects. yes  The ASCVD Risk score (Kelsi GUEVARA, et al., 2019) failed to calculate for the following reasons:    The valid total cholesterol range is 130 to 320 mg/dL    Lab Results   Component Value Date/Time    TRIG 107 09/12/2023 08:34 AM    CHOL 95 09/12/2023 08:34 AM    LDLCALC 21.6 09/12/2023 08:34 AM    HDL 52 09/12/2023 08:34 AM     Key Hyperlipidemia Meds            simvastatin (ZOCOR) 10 MG tablet (Taking)    Sig - Route: TAKE ONE TABLET BY MOUTH ONCE NIGHTLY - Oral             ROS:     Review of Systems  As stated in HPI, otherwise all others negative.       The problem list was updated as a part of today's visit.  Patient Active Problem List   Diagnosis    Controlled type 2 diabetes mellitus without complication, without long-term current use of insulin (HCC)    History of hysterectomy    Diastolic dysfunction    Baker's cyst of knee    Essential hypertension    Mixed hyperlipidemia    Gastroesophageal reflux disease    History of anaphylaxis to COVID-19 vaccine    Herpesviral infection, unspecified       The PSH, FH were reviewed.      SH:  Social History     Tobacco Use    Smoking status: Never    Smokeless tobacco: Never   Substance Use Topics    Alcohol use: No    Drug use: No       Medications/Allergies:  Current Outpatient Medications on File Prior to Visit   Medication Sig Dispense Refill    simvastatin (ZOCOR) 10 MG tablet TAKE ONE TABLET BY MOUTH ONCE NIGHTLY 90 tablet 1    valACYclovir (VALTREX) 1 g tablet TAKE ONE TABLET BY MOUTH TWICE A DAY FOR 7 DAYS 90 tablet 1    metoprolol succinate (TOPROL XL) 50 MG extended release tablet Take 1 tablet by mouth daily 90 tablet 1    amLODIPine (NORVASC) 10 MG tablet TAKE 1 TABLET BY MOUTH DAILY 90 tablet 1    hydroCHLOROthiazide (HYDRODIURIL) 25 MG tablet TAKE 1 TABLET BY MOUTH  DAILY 90 tablet 1    lisinopril (PRINIVIL;ZESTRIL) 40 MG tablet TAKE 1 TABLET BY MOUTH  DAILY 90 tablet 1    omeprazole 
show

## 2024-04-03 ENCOUNTER — HOSPITAL ENCOUNTER (OUTPATIENT)
Facility: HOSPITAL | Age: 69
Setting detail: SPECIMEN
Discharge: HOME | End: 2024-04-06
Payer: MEDICARE

## 2024-04-03 DIAGNOSIS — I10 ESSENTIAL HYPERTENSION: ICD-10-CM

## 2024-04-03 DIAGNOSIS — E11.9 CONTROLLED TYPE 2 DIABETES MELLITUS WITHOUT COMPLICATION, WITHOUT LONG-TERM CURRENT USE OF INSULIN (HCC): ICD-10-CM

## 2024-04-03 DIAGNOSIS — E78.2 MIXED HYPERLIPIDEMIA: ICD-10-CM

## 2024-04-03 LAB
ALBUMIN SERPL-MCNC: 3.9 G/DL (ref 3.4–5)
ALBUMIN/GLOB SERPL: 1.1 (ref 0.8–1.7)
ALP SERPL-CCNC: 74 U/L (ref 45–117)
ALT SERPL-CCNC: 18 U/L (ref 13–56)
ANION GAP SERPL CALC-SCNC: 6 MMOL/L (ref 3–18)
AST SERPL-CCNC: 9 U/L (ref 10–38)
BILIRUB SERPL-MCNC: 0.6 MG/DL (ref 0.2–1)
BUN SERPL-MCNC: 11 MG/DL (ref 7–18)
BUN/CREAT SERPL: 12 (ref 12–20)
CALCIUM SERPL-MCNC: 9.5 MG/DL (ref 8.5–10.1)
CHLORIDE SERPL-SCNC: 107 MMOL/L (ref 100–111)
CHOLEST SERPL-MCNC: 95 MG/DL
CO2 SERPL-SCNC: 28 MMOL/L (ref 21–32)
CREAT SERPL-MCNC: 0.94 MG/DL (ref 0.6–1.3)
CREAT UR-MCNC: 167 MG/DL (ref 30–125)
ERYTHROCYTE [DISTWIDTH] IN BLOOD BY AUTOMATED COUNT: 14.7 % (ref 11.6–14.5)
EST. AVERAGE GLUCOSE BLD GHB EST-MCNC: 128 MG/DL
GLOBULIN SER CALC-MCNC: 3.5 G/DL (ref 2–4)
GLUCOSE SERPL-MCNC: 105 MG/DL (ref 74–99)
HBA1C MFR BLD: 6.1 % (ref 4.2–5.6)
HCT VFR BLD AUTO: 38.3 % (ref 35–45)
HDLC SERPL-MCNC: 58 MG/DL (ref 40–60)
HDLC SERPL: 1.6 (ref 0–5)
HGB BLD-MCNC: 11.9 G/DL (ref 12–16)
LDLC SERPL CALC-MCNC: 16.6 MG/DL (ref 0–100)
LIPID PANEL: NORMAL
MCH RBC QN AUTO: 25.1 PG (ref 24–34)
MCHC RBC AUTO-ENTMCNC: 31.1 G/DL (ref 31–37)
MCV RBC AUTO: 80.6 FL (ref 78–100)
MICROALBUMIN UR-MCNC: 1.89 MG/DL (ref 0–3)
MICROALBUMIN/CREAT UR-RTO: 11 MG/G (ref 0–30)
NRBC # BLD: 0 K/UL (ref 0–0.01)
NRBC BLD-RTO: 0 PER 100 WBC
PLATELET # BLD AUTO: 327 K/UL (ref 135–420)
PMV BLD AUTO: 10.1 FL (ref 9.2–11.8)
POTASSIUM SERPL-SCNC: 3.7 MMOL/L (ref 3.5–5.5)
PROT SERPL-MCNC: 7.4 G/DL (ref 6.4–8.2)
RBC # BLD AUTO: 4.75 M/UL (ref 4.2–5.3)
SODIUM SERPL-SCNC: 141 MMOL/L (ref 136–145)
TRIGL SERPL-MCNC: 102 MG/DL
VLDLC SERPL CALC-MCNC: 20.4 MG/DL
WBC # BLD AUTO: 10.2 K/UL (ref 4.6–13.2)

## 2024-04-03 PROCEDURE — 85027 COMPLETE CBC AUTOMATED: CPT

## 2024-04-03 PROCEDURE — 82570 ASSAY OF URINE CREATININE: CPT

## 2024-04-03 PROCEDURE — 36415 COLL VENOUS BLD VENIPUNCTURE: CPT

## 2024-04-03 PROCEDURE — 80053 COMPREHEN METABOLIC PANEL: CPT

## 2024-04-03 PROCEDURE — 83036 HEMOGLOBIN GLYCOSYLATED A1C: CPT

## 2024-04-03 PROCEDURE — 80061 LIPID PANEL: CPT

## 2024-04-03 PROCEDURE — 82043 UR ALBUMIN QUANTITATIVE: CPT

## 2024-04-09 SDOH — HEALTH STABILITY: PHYSICAL HEALTH: ON AVERAGE, HOW MANY MINUTES DO YOU ENGAGE IN EXERCISE AT THIS LEVEL?: 30 MIN

## 2024-04-09 SDOH — HEALTH STABILITY: PHYSICAL HEALTH: ON AVERAGE, HOW MANY DAYS PER WEEK DO YOU ENGAGE IN MODERATE TO STRENUOUS EXERCISE (LIKE A BRISK WALK)?: 4 DAYS

## 2024-04-09 ASSESSMENT — LIFESTYLE VARIABLES
HOW OFTEN DO YOU HAVE SIX OR MORE DRINKS ON ONE OCCASION: 1
HOW MANY STANDARD DRINKS CONTAINING ALCOHOL DO YOU HAVE ON A TYPICAL DAY: 1
HOW OFTEN DO YOU HAVE A DRINK CONTAINING ALCOHOL: MONTHLY OR LESS
HOW MANY STANDARD DRINKS CONTAINING ALCOHOL DO YOU HAVE ON A TYPICAL DAY: 1 OR 2
HOW OFTEN DO YOU HAVE A DRINK CONTAINING ALCOHOL: 2

## 2024-04-09 ASSESSMENT — PATIENT HEALTH QUESTIONNAIRE - PHQ9
SUM OF ALL RESPONSES TO PHQ QUESTIONS 1-9: 0
SUM OF ALL RESPONSES TO PHQ9 QUESTIONS 1 & 2: 0
2. FEELING DOWN, DEPRESSED OR HOPELESS: NOT AT ALL
SUM OF ALL RESPONSES TO PHQ QUESTIONS 1-9: 0
SUM OF ALL RESPONSES TO PHQ QUESTIONS 1-9: 0
1. LITTLE INTEREST OR PLEASURE IN DOING THINGS: NOT AT ALL
SUM OF ALL RESPONSES TO PHQ QUESTIONS 1-9: 0

## 2024-04-10 ENCOUNTER — OFFICE VISIT (OUTPATIENT)
Facility: CLINIC | Age: 69
End: 2024-04-10
Payer: MEDICARE

## 2024-04-10 VITALS
DIASTOLIC BLOOD PRESSURE: 83 MMHG | RESPIRATION RATE: 16 BRPM | SYSTOLIC BLOOD PRESSURE: 120 MMHG | TEMPERATURE: 98.1 F | OXYGEN SATURATION: 92 % | WEIGHT: 212.4 LBS | BODY MASS INDEX: 36.26 KG/M2 | HEART RATE: 80 BPM | HEIGHT: 64 IN

## 2024-04-10 DIAGNOSIS — I10 ESSENTIAL HYPERTENSION: ICD-10-CM

## 2024-04-10 DIAGNOSIS — E78.2 MIXED HYPERLIPIDEMIA: ICD-10-CM

## 2024-04-10 DIAGNOSIS — Z00.00 MEDICARE ANNUAL WELLNESS VISIT, SUBSEQUENT: Primary | ICD-10-CM

## 2024-04-10 DIAGNOSIS — E11.9 CONTROLLED TYPE 2 DIABETES MELLITUS WITHOUT COMPLICATION, WITHOUT LONG-TERM CURRENT USE OF INSULIN (HCC): ICD-10-CM

## 2024-04-10 DIAGNOSIS — Z71.89 ADVANCED CARE PLANNING/COUNSELING DISCUSSION: ICD-10-CM

## 2024-04-10 PROCEDURE — 3017F COLORECTAL CA SCREEN DOC REV: CPT | Performed by: NURSE PRACTITIONER

## 2024-04-10 PROCEDURE — 3074F SYST BP LT 130 MM HG: CPT | Performed by: NURSE PRACTITIONER

## 2024-04-10 PROCEDURE — 3079F DIAST BP 80-89 MM HG: CPT | Performed by: NURSE PRACTITIONER

## 2024-04-10 PROCEDURE — 1123F ACP DISCUSS/DSCN MKR DOCD: CPT | Performed by: NURSE PRACTITIONER

## 2024-04-10 PROCEDURE — G0439 PPPS, SUBSEQ VISIT: HCPCS | Performed by: NURSE PRACTITIONER

## 2024-04-10 PROCEDURE — 3044F HG A1C LEVEL LT 7.0%: CPT | Performed by: NURSE PRACTITIONER

## 2024-04-10 NOTE — PATIENT INSTRUCTIONS

## 2024-04-10 NOTE — PROGRESS NOTES
Jaycee De Leon presents today for   Chief Complaint   Patient presents with    Medicare AWV    Discuss Labs       Is someone accompanying this pt? no    Is the patient using any DME equipment during OV? no    Depression Screening:       No data to display                Learning Assessment:  Failed to redirect to the Timeline version of the REVFS SmartLink.    Fall Risk  Failed to redirect to the Timeline version of the REVFS SmartLink.    ADL       No data to display                Health Maintenance reviewed and discussed and ordered per Provider.    Health Maintenance Due   Topic Date Due    DEXA (modify frequency per FRAX score)  Never done    Respiratory Syncytial Virus (RSV) Pregnant or age 60 yrs+ (1 - 1-dose 60+ series) Never done    Diabetic retinal exam  06/01/2023    Diabetic foot exam  08/18/2023    COVID-19 Vaccine (4 - 2023-24 season) 09/01/2023    Annual Wellness Visit (Medicare Advantage)  Never done   .    \"Have you been to the ER, urgent care clinic since your last visit?  Hospitalized since your last visit?\"    no    “Have you seen or consulted any other health care providers outside of [facility] since your last visit?”    no            Click Here for Release of Records Request  
dentist      Safety:  Do you have non-slip mats or non-slip surfaces or shower bars or grab bars in your shower or bathtub?: (!) No  Interventions:  Patient comments: has a non-slip floor in tub/shower     Advanced Directives:  Do you have a Living Will?: (!) No    Intervention:  has NO advanced directive - information provided                     Objective   Vitals:    04/10/24 1450   BP: 120/83   Pulse: 80   Resp: 16   Temp: 98.1 °F (36.7 °C)   TempSrc: Temporal   SpO2: 92%   Weight: 96.3 kg (212 lb 6.4 oz)   Height: 1.626 m (5' 4\")      Body mass index is 36.46 kg/m².        Physical Exam  Constitutional:       Appearance: Normal appearance.   HENT:      Head: Normocephalic and atraumatic.   Cardiovascular:      Rate and Rhythm: Normal rate and regular rhythm.      Heart sounds: Normal heart sounds.   Pulmonary:      Effort: Pulmonary effort is normal.      Breath sounds: Normal breath sounds.   Musculoskeletal:      Cervical back: Normal range of motion.   Skin:     General: Skin is warm and dry.   Neurological:      Mental Status: She is alert. Mental status is at baseline.   Psychiatric:         Mood and Affect: Mood normal.         Behavior: Behavior normal.         Thought Content: Thought content normal.         Judgment: Judgment normal.            Allergies   Allergen Reactions    Amoxicillin Anaphylaxis    Jardiance [Empagliflozin] Other (See Comments)     Stomach cramps and headaches     Prior to Visit Medications    Medication Sig Taking? Authorizing Provider   amLODIPine (NORVASC) 10 MG tablet Take 1 tablet by mouth daily Yes Rachel Dalton APRN - CNP   hydroCHLOROthiazide (HYDRODIURIL) 25 MG tablet Take 1 tablet by mouth daily Yes Rachel Dalton APRN - CNP   lisinopril (PRINIVIL;ZESTRIL) 40 MG tablet Take 1 tablet by mouth daily Yes Rachel Dalton APRN - CNP   metoprolol succinate (TOPROL XL) 50 MG extended release tablet Take 1 tablet by mouth daily Yes Rachel Dalton APRN -

## 2024-04-10 NOTE — ACP (ADVANCE CARE PLANNING)
Advance Care Planning     Advance Care Planning (ACP) Physician/NP/PA Conversation    Date of Conversation: 4/10/2024  Conducted with: Patient with Decision Making Capacity    Healthcare Decision Maker:      Primary Decision Maker: Rena Bartlett - [phone]    Click here to complete Healthcare Decision Makers including selection of the Healthcare Decision Maker Relationship (ie \"Primary\")  Today we documented Decision Maker(s) consistent with Legal Next of Kin hierarchy.    Care Preferences:    Hospitalization:  \"If your health worsens and it becomes clear that your chance of recovery is unlikely, what would be your preference regarding hospitalization?\"  The patient would prefer comfort-focused treatment without hospitalization.    Ventilation:  \"If you were unable to breath on your own and your chance of recovery was unlikely, what would be your preference about the use of a ventilator (breathing machine) if it was available to you?\"  The patient is unsure.    Resuscitation:  \"In the event your heart stopped as a result of an underlying serious health condition, would you want attempts made to restart your heart, or would you prefer a natural death?\"  Yes, attempt to resuscitate.    ventilation preferences, hospitalization preferences, and resuscitation preferences    Conversation Outcomes / Follow-Up Plan:  ACP in process - information provided, considering goals and options  Reviewed DNR/DNI and patient elects Full Code (Attempt Resuscitation)    Length of Voluntary ACP Conversation in minutes:  <16 minutes (Non-Billable)    Rachel Dalton, APRN - CNP

## 2024-08-05 LAB
A/G RATIO: 1.4 RATIO (ref 1.1–2.6)
ALBUMIN: 4.2 G/DL (ref 3.5–5)
ALP BLD-CCNC: 82 U/L (ref 40–120)
ALT SERPL-CCNC: 15 U/L (ref 5–40)
ANION GAP SERPL CALCULATED.3IONS-SCNC: 10 MMOL/L (ref 3–15)
AST SERPL-CCNC: 14 U/L (ref 10–37)
BILIRUB SERPL-MCNC: 0.4 MG/DL (ref 0.2–1.2)
BUN BLDV-MCNC: 22 MG/DL (ref 6–22)
CALCIUM SERPL-MCNC: 9.7 MG/DL (ref 8.4–10.5)
CHLORIDE BLD-SCNC: 103 MMOL/L (ref 98–110)
CHOLESTEROL, TOTAL: 94 MG/DL (ref 110–200)
CHOLESTEROL/HDL RATIO: 1.9 (ref 0–5)
CO2: 27 MMOL/L (ref 20–32)
CREAT SERPL-MCNC: 0.6 MG/DL (ref 0.8–1.4)
CREATININE URINE: 128 MG/DL
ESTIMATED AVERAGE GLUCOSE: 140 MG/DL (ref 91–123)
GFR, ESTIMATED: >60 ML/MIN/1.73 SQ.M.
GLOBULIN: 3 G/DL (ref 2–4)
GLUCOSE: 113 MG/DL (ref 70–99)
HBA1C MFR BLD: 6.5 % (ref 4.8–5.6)
HCT VFR BLD CALC: 37.8 % (ref 35.1–48.3)
HDLC SERPL-MCNC: 49 MG/DL
HEMOGLOBIN: 12 G/DL (ref 11.7–16.1)
LDL CHOLESTEROL: 25 MG/DL (ref 50–99)
LDL/HDL RATIO: 0.5
MCH RBC QN AUTO: 25 PG (ref 26–34)
MCHC RBC AUTO-ENTMCNC: 32 G/DL (ref 31–36)
MCV RBC AUTO: 78 FL (ref 80–99)
MICROALB/CREAT RATIO (UG/MG CREAT.): 16.6 (ref 0–30)
MICROALBUMIN/CREAT 24H UR: 21.2 MG/L (ref 0.1–17)
NON-HDL CHOLESTEROL: 45 MG/DL
PDW BLD-RTO: 14.4 % (ref 10–15.5)
PLATELET # BLD: 309 K/UL (ref 140–440)
PMV BLD AUTO: 9.8 FL (ref 9–13)
POTASSIUM SERPL-SCNC: 3.6 MMOL/L (ref 3.5–5.5)
RBC # BLD: 4.85 M/UL (ref 3.8–5.2)
SODIUM BLD-SCNC: 140 MMOL/L (ref 133–145)
TOTAL PROTEIN: 7.2 G/DL (ref 6.2–8.1)
TRIGL SERPL-MCNC: 99 MG/DL (ref 40–149)
VLDLC SERPL CALC-MCNC: 20 MG/DL (ref 8–30)
WBC # BLD: 10.7 K/UL (ref 4–11)

## 2024-08-12 ENCOUNTER — OFFICE VISIT (OUTPATIENT)
Facility: CLINIC | Age: 69
End: 2024-08-12
Payer: MEDICARE

## 2024-08-12 VITALS
OXYGEN SATURATION: 94 % | TEMPERATURE: 98.4 F | RESPIRATION RATE: 18 BRPM | SYSTOLIC BLOOD PRESSURE: 116 MMHG | BODY MASS INDEX: 34.97 KG/M2 | HEART RATE: 70 BPM | WEIGHT: 204.8 LBS | DIASTOLIC BLOOD PRESSURE: 79 MMHG | HEIGHT: 64 IN

## 2024-08-12 DIAGNOSIS — E78.2 MIXED HYPERLIPIDEMIA: ICD-10-CM

## 2024-08-12 DIAGNOSIS — I10 ESSENTIAL HYPERTENSION: ICD-10-CM

## 2024-08-12 DIAGNOSIS — E11.9 CONTROLLED TYPE 2 DIABETES MELLITUS WITHOUT COMPLICATION, WITHOUT LONG-TERM CURRENT USE OF INSULIN (HCC): ICD-10-CM

## 2024-08-12 DIAGNOSIS — K21.9 GASTROESOPHAGEAL REFLUX DISEASE WITHOUT ESOPHAGITIS: ICD-10-CM

## 2024-08-12 PROCEDURE — 1090F PRES/ABSN URINE INCON ASSESS: CPT | Performed by: NURSE PRACTITIONER

## 2024-08-12 PROCEDURE — 3078F DIAST BP <80 MM HG: CPT | Performed by: NURSE PRACTITIONER

## 2024-08-12 PROCEDURE — G8427 DOCREV CUR MEDS BY ELIG CLIN: HCPCS | Performed by: NURSE PRACTITIONER

## 2024-08-12 PROCEDURE — G8417 CALC BMI ABV UP PARAM F/U: HCPCS | Performed by: NURSE PRACTITIONER

## 2024-08-12 PROCEDURE — 2022F DILAT RTA XM EVC RTNOPTHY: CPT | Performed by: NURSE PRACTITIONER

## 2024-08-12 PROCEDURE — 1036F TOBACCO NON-USER: CPT | Performed by: NURSE PRACTITIONER

## 2024-08-12 PROCEDURE — 3044F HG A1C LEVEL LT 7.0%: CPT | Performed by: NURSE PRACTITIONER

## 2024-08-12 PROCEDURE — 99214 OFFICE O/P EST MOD 30 MIN: CPT | Performed by: NURSE PRACTITIONER

## 2024-08-12 PROCEDURE — 1123F ACP DISCUSS/DSCN MKR DOCD: CPT | Performed by: NURSE PRACTITIONER

## 2024-08-12 PROCEDURE — G8400 PT W/DXA NO RESULTS DOC: HCPCS | Performed by: NURSE PRACTITIONER

## 2024-08-12 PROCEDURE — 3017F COLORECTAL CA SCREEN DOC REV: CPT | Performed by: NURSE PRACTITIONER

## 2024-08-12 PROCEDURE — 3074F SYST BP LT 130 MM HG: CPT | Performed by: NURSE PRACTITIONER

## 2024-08-12 RX ORDER — METFORMIN HCL 500 MG
500 TABLET, EXTENDED RELEASE 24 HR ORAL
Qty: 90 TABLET | Refills: 1 | Status: SHIPPED | OUTPATIENT
Start: 2024-08-12

## 2024-08-12 RX ORDER — ATORVASTATIN CALCIUM 10 MG/1
10 TABLET, FILM COATED ORAL DAILY
Qty: 90 TABLET | Refills: 1 | Status: SHIPPED | OUTPATIENT
Start: 2024-08-12

## 2024-08-12 RX ORDER — METOPROLOL SUCCINATE 50 MG/1
50 TABLET, EXTENDED RELEASE ORAL DAILY
Qty: 90 TABLET | Refills: 1 | Status: SHIPPED | OUTPATIENT
Start: 2024-08-12

## 2024-08-12 RX ORDER — SIMVASTATIN 10 MG
10 TABLET ORAL NIGHTLY
Qty: 90 TABLET | Refills: 1 | Status: CANCELLED | OUTPATIENT
Start: 2024-08-12

## 2024-08-12 RX ORDER — AMLODIPINE BESYLATE 10 MG/1
10 TABLET ORAL DAILY
Qty: 90 TABLET | Refills: 1 | Status: SHIPPED | OUTPATIENT
Start: 2024-08-12

## 2024-08-12 RX ORDER — HYDROCHLOROTHIAZIDE 25 MG/1
25 TABLET ORAL DAILY
Qty: 90 TABLET | Refills: 1 | Status: SHIPPED | OUTPATIENT
Start: 2024-08-12

## 2024-08-12 RX ORDER — LISINOPRIL 40 MG/1
40 TABLET ORAL DAILY
Qty: 90 TABLET | Refills: 1 | Status: SHIPPED | OUTPATIENT
Start: 2024-08-12

## 2024-08-12 SDOH — ECONOMIC STABILITY: FOOD INSECURITY: WITHIN THE PAST 12 MONTHS, YOU WORRIED THAT YOUR FOOD WOULD RUN OUT BEFORE YOU GOT MONEY TO BUY MORE.: NEVER TRUE

## 2024-08-12 SDOH — ECONOMIC STABILITY: INCOME INSECURITY: HOW HARD IS IT FOR YOU TO PAY FOR THE VERY BASICS LIKE FOOD, HOUSING, MEDICAL CARE, AND HEATING?: NOT HARD AT ALL

## 2024-08-12 SDOH — ECONOMIC STABILITY: FOOD INSECURITY: WITHIN THE PAST 12 MONTHS, THE FOOD YOU BOUGHT JUST DIDN'T LAST AND YOU DIDN'T HAVE MONEY TO GET MORE.: NEVER TRUE

## 2024-08-12 ASSESSMENT — PATIENT HEALTH QUESTIONNAIRE - PHQ9
SUM OF ALL RESPONSES TO PHQ QUESTIONS 1-9: 0
2. FEELING DOWN, DEPRESSED OR HOPELESS: NOT AT ALL
1. LITTLE INTEREST OR PLEASURE IN DOING THINGS: NOT AT ALL
SUM OF ALL RESPONSES TO PHQ QUESTIONS 1-9: 0
SUM OF ALL RESPONSES TO PHQ9 QUESTIONS 1 & 2: 0

## 2024-08-12 NOTE — PROGRESS NOTES
Room 9     Jaycee De Leon had concerns including Follow-up Chronic Condition, Hypertension, Diabetes, and Cholesterol Problem. for today's visit .     When asked if patient has any concerns she would like to address with YAJAIRA Dalton Patient states I am stiff , sore all over my body and I am having a lot  of leg cramps  . YAJAIRA Dalton has been notified  of patient concerns .        1. \"Have you been to the ER, urgent care clinic since your last visit?  Hospitalized since your last visit?\" .NO    2. \"Have you seen or consulted any other health care providers outside of the [facility] since your last visit?\" No     3. For patients aged 45-75: Has the patient had a colonoscopy / FIT/ Cologuard? Yes      If the patient is female:    4. For patients aged 40-74: Has the patient had a mammogram within the past 2 years? Yes       5. For patients aged 21-65: Has the patient had a pap smear? {Cancer Care Gap present? N/A          4/9/2024     4:15 PM   Amb Fall Risk Assessment and TUG Test   Do you feel unsteady or are you worried about falling?  no   2 or more falls in past year? no   Fall with injury in past year? no              8/12/2024     2:34 PM   PHQ-9    Little interest or pleasure in doing things 0   Feeling down, depressed, or hopeless 0   PHQ-2 Score 0   PHQ-9 Total Score 0          Health Maintenance Due   Topic Date Due    DEXA (modify frequency per FRAX score)  Never done    Respiratory Syncytial Virus (RSV) Pregnant or age 60 yrs+ (1 - 1-dose 60+ series) Never done    Diabetic retinal exam  06/01/2023    Diabetic foot exam  08/18/2023    COVID-19 Vaccine (4 - 2023-24 season) 09/01/2023    Flu vaccine (1) 08/01/2024

## 2024-08-12 NOTE — PROGRESS NOTES
[address] [place] [address]               [phone]      Jaycee De Leon is a 68 y.o. female and presents with Follow-up Chronic Condition, Hypertension, Diabetes, and Cholesterol Problem       Assessment/Plan:    1. Essential hypertension  -     amLODIPine (NORVASC) 10 MG tablet; Take 1 tablet by mouth daily, Disp-90 tablet, R-1Normal  -     hydroCHLOROthiazide (HYDRODIURIL) 25 MG tablet; Take 1 tablet by mouth daily, Disp-90 tablet, R-1Normal  -     lisinopril (PRINIVIL;ZESTRIL) 40 MG tablet; Take 1 tablet by mouth daily, Disp-90 tablet, R-1Normal  -     metoprolol succinate (TOPROL XL) 50 MG extended release tablet; Take 1 tablet by mouth daily, Disp-90 tablet, R-1Normal  2. Mixed hyperlipidemia  -     atorvastatin (LIPITOR) 10 MG tablet; Take 1 tablet by mouth daily, Disp-90 tablet, R-1Normal  3. Controlled type 2 diabetes mellitus without complication, without long-term current use of insulin (HCC)  -     metFORMIN (GLUCOPHAGE-XR) 500 MG extended release tablet; Take 1 tablet by mouth Daily with supper, Disp-90 tablet, R-1Normal  4. Gastroesophageal reflux disease without esophagitis  -     omeprazole (PRILOSEC) 20 MG delayed release capsule; Take 1 capsule by mouth daily, Disp-90 capsule, R-1Normal   Blood pressure controlled, continue amlodipine, hctz, lisinopril and metoprolol at same dose  Dm controlled, A1C 6.6%  Lipids controlled, LDL 25  Refills provided, labs prior to next visit  Patient verbalized understanding and is in agreement with this plan of care      Follow up and disposition:   Return in about 4 months (around 12/12/2024) for DM, HTN, HLD, 15min, office, w/labsprior.      Subjective:    Labs obtained prior to visit? Yes  Reviewed with patient? yes    DMII-   Patient reports medication compliance Yes  Diabetic diet compliance frequently  Patient monitors blood sugars regularly never   Home glucose readings: does not check   Denies hypoglycemic episodes  (ZOCOR) 10 MG tablet TAKE ONE TABLET BY MOUTH ONCE NIGHTLY 90 tablet 1    azelastine (ASTELIN) 0.1 % nasal spray 1 spray by Nasal route 2 times daily Use in each nostril as directed 30 mL 0    aspirin 81 MG EC tablet Take 1 tablet by mouth daily      valACYclovir (VALTREX) 1 g tablet TAKE ONE TABLET BY MOUTH TWICE A DAY FOR 7 DAYS (Patient not taking: Reported on 8/12/2024) 90 tablet 1    benzonatate (TESSALON) 200 MG capsule Take 1 capsule by mouth 3 times daily as needed (Patient not taking: Reported on 4/10/2024)       No current facility-administered medications on file prior to visit.        Allergies   Allergen Reactions    Amoxicillin Anaphylaxis    Jardiance [Empagliflozin] Other (See Comments)     Stomach cramps and headaches       Objective:  /79   Pulse 70   Temp 98.4 °F (36.9 °C) (Temporal)   Resp 18   Ht 1.626 m (5' 4\")   Wt 92.9 kg (204 lb 12.8 oz)   SpO2 94%   BMI 35.15 kg/m²  Body mass index is 35.15 kg/m².    Physical assessment  Physical Exam            I have discussed the diagnosis with the patient and the intended plan as seen in the above orders.  The patient has received an After-Visit Summary and questions were answered concerning future plans.     An After Visit Summary was printed and given to the patient.    All diagnosis have been discussed with the patient and all of the patient's questions have been answered.           Rachel Dalton, [facility]  [address] [place] [address]. [phone]

## 2024-08-12 NOTE — PATIENT INSTRUCTIONS
- If stretching is ineffectual, other measures that may offer relief from the acute cramp include:  Walking or leg jiggling followed by leg elevation  A hot shower with the water directed towards the area of the cramp (usually for five minutes) or a warm bath  Ice massage    We suggest vitamin B complex (three times daily, containing 30 mg of vitamin B6) or vitamin E (800 international units before bed).